# Patient Record
Sex: MALE | Race: WHITE | Employment: OTHER | URBAN - METROPOLITAN AREA
[De-identification: names, ages, dates, MRNs, and addresses within clinical notes are randomized per-mention and may not be internally consistent; named-entity substitution may affect disease eponyms.]

---

## 2020-12-03 ENCOUNTER — HOSPITAL ENCOUNTER (OUTPATIENT)
Dept: INTERVENTIONAL RADIOLOGY/VASCULAR | Age: 75
Discharge: HOME | End: 2020-12-05

## 2020-12-03 PROCEDURE — 32555 ASPIRATE PLEURA W/ IMAGING: CPT

## 2020-12-03 PROCEDURE — 32555 ASPIRATE PLEURA W/ IMAGING: CPT | Performed by: RADIOLOGY

## 2020-12-03 PROCEDURE — C1729 CATH, DRAINAGE: HCPCS

## 2020-12-03 NOTE — BRIEF OP NOTE
Brief Postoperative Note    Bozena Wilcox  YOB: 1945  [id number]    Pre-operative Diagnosis and Procedure:  Thoracentesis for pleural effusion    Post-operative Diagnosis: Same    Anesthesia: Local    Estimated Blood Loss: < 10 cc    Surgeon: Neo NIETO     Complications: none    Specimen obtained: none     Findings: none     Neo Holloway II   12/3/2020 2:19 PM

## 2020-12-03 NOTE — H&P
Interventional Radiology  Attending Pre-operative History and Physical    DIAGNOSIS:  There is no problem list on file for this patient. CHIEF COMPLAINT: <principal problem not specified>        No current outpatient medications on file. Allergies not on file    No past medical history on file. No past surgical history on file. No family history on file.     Social History     Socioeconomic History    Marital status: Single     Spouse name: Not on file    Number of children: Not on file    Years of education: Not on file    Highest education level: Not on file   Occupational History    Not on file   Social Needs    Financial resource strain: Not on file    Food insecurity     Worry: Not on file     Inability: Not on file    Transportation needs     Medical: Not on file     Non-medical: Not on file   Tobacco Use    Smoking status: Not on file   Substance and Sexual Activity    Alcohol use: Not on file    Drug use: Not on file    Sexual activity: Not on file   Lifestyle    Physical activity     Days per week: Not on file     Minutes per session: Not on file    Stress: Not on file   Relationships    Social connections     Talks on phone: Not on file     Gets together: Not on file     Attends Pentecostalism service: Not on file     Active member of club or organization: Not on file     Attends meetings of clubs or organizations: Not on file     Relationship status: Not on file    Intimate partner violence     Fear of current or ex partner: Not on file     Emotionally abused: Not on file     Physically abused: Not on file     Forced sexual activity: Not on file   Other Topics Concern    Not on file   Social History Narrative    Not on file       ROS: Non-contributory other than as noted above    PHYSICAL EXAM:      Heart and Lungs:  demonstrate no contraindications to proceed    DATA:  CBC:   Lab Results   Component Value Date    WBC 4.1 12/03/2020    RBC 4.65 12/03/2020    HGB 12.7 12/03/2020 HCT 42.6 12/03/2020    MCV 91.6 12/03/2020    MCH 27.3 12/03/2020    MCHC 29.8 12/03/2020    RDW 13.0 12/03/2020     12/03/2020    MPV 11.5 12/03/2020     CBC with Differential:    Lab Results   Component Value Date    WBC 4.1 12/03/2020    RBC 4.65 12/03/2020    HGB 12.7 12/03/2020    HCT 42.6 12/03/2020     12/03/2020    MCV 91.6 12/03/2020    MCH 27.3 12/03/2020    MCHC 29.8 12/03/2020    RDW 13.0 12/03/2020    LYMPHOPCT 11.3 12/01/2020    MONOPCT 2.6 12/01/2020    BASOPCT 0.8 12/01/2020    MONOSABS 0.12 12/01/2020    LYMPHSABS 0.44 12/01/2020    EOSABS 0.10 12/01/2020    BASOSABS 0.00 12/01/2020     Platelets:    Lab Results   Component Value Date     12/03/2020     BUN/Creatinine:    Lab Results   Component Value Date    BUN 17 12/03/2020    CREATININE 0.6 12/03/2020       ASSESSMENT AND PLAN:  1. Thoracentesis for pleural effusion  2. Procedure options, risks and benefits reviewed with patient. Patient expresses understanding.     Electronically signed by Esequiel Souza II, MD on 12/3/2020 at 2:19 PM

## 2021-02-02 ENCOUNTER — HOSPITAL ENCOUNTER (INPATIENT)
Age: 76
LOS: 24 days | Discharge: HOME | DRG: 056 | End: 2021-02-26
Attending: PHYSICAL MEDICINE & REHABILITATION | Admitting: PHYSICAL MEDICINE & REHABILITATION
Payer: MEDICARE

## 2021-02-02 PROBLEM — G93.41 ACUTE METABOLIC ENCEPHALOPATHY: Status: ACTIVE | Noted: 2021-02-02

## 2021-02-02 PROCEDURE — 6370000000 HC RX 637 (ALT 250 FOR IP): Performed by: PHYSICAL MEDICINE & REHABILITATION

## 2021-02-02 PROCEDURE — 2580000003 HC RX 258: Performed by: PHYSICAL MEDICINE & REHABILITATION

## 2021-02-02 PROCEDURE — 94760 N-INVAS EAR/PLS OXIMETRY 1: CPT

## 2021-02-02 PROCEDURE — 1280000000 HC REHAB R&B

## 2021-02-02 PROCEDURE — 94640 AIRWAY INHALATION TREATMENT: CPT

## 2021-02-02 RX ORDER — IPRATROPIUM BROMIDE AND ALBUTEROL SULFATE 2.5; .5 MG/3ML; MG/3ML
1 SOLUTION RESPIRATORY (INHALATION)
Status: DISCONTINUED | OUTPATIENT
Start: 2021-02-02 | End: 2021-02-26 | Stop reason: HOSPADM

## 2021-02-02 RX ORDER — ACETAMINOPHEN 325 MG/1
650 TABLET ORAL EVERY 4 HOURS PRN
Status: DISCONTINUED | OUTPATIENT
Start: 2021-02-02 | End: 2021-02-26 | Stop reason: HOSPADM

## 2021-02-02 RX ORDER — FUROSEMIDE 40 MG/1
40 TABLET ORAL DAILY
Status: ON HOLD | COMMUNITY
End: 2021-02-26 | Stop reason: HOSPADM

## 2021-02-02 RX ORDER — POLYETHYLENE GLYCOL 3350 17 G/17G
17 POWDER, FOR SOLUTION ORAL DAILY PRN
Status: DISCONTINUED | OUTPATIENT
Start: 2021-02-02 | End: 2021-02-20

## 2021-02-02 RX ORDER — PANTOPRAZOLE SODIUM 40 MG/1
40 TABLET, DELAYED RELEASE ORAL
Status: DISCONTINUED | OUTPATIENT
Start: 2021-02-03 | End: 2021-02-26 | Stop reason: HOSPADM

## 2021-02-02 RX ORDER — LORATADINE 10 MG/1
10 TABLET ORAL DAILY
Status: ON HOLD | COMMUNITY
End: 2021-02-26 | Stop reason: HOSPADM

## 2021-02-02 RX ORDER — SODIUM CHLORIDE 0.9 % (FLUSH) 0.9 %
10 SYRINGE (ML) INJECTION PRN
Status: DISCONTINUED | OUTPATIENT
Start: 2021-02-02 | End: 2021-02-11

## 2021-02-02 RX ORDER — CHLORHEXIDINE GLUCONATE 0.12 MG/ML
15 RINSE ORAL 2 TIMES DAILY
Status: DISCONTINUED | OUTPATIENT
Start: 2021-02-02 | End: 2021-02-26 | Stop reason: HOSPADM

## 2021-02-02 RX ORDER — ASPIRIN 81 MG/1
81 TABLET ORAL DAILY
Status: ON HOLD | COMMUNITY
End: 2021-02-26 | Stop reason: HOSPADM

## 2021-02-02 RX ORDER — ASPIRIN 81 MG/1
81 TABLET, CHEWABLE ORAL DAILY
Status: DISCONTINUED | OUTPATIENT
Start: 2021-02-03 | End: 2021-02-26 | Stop reason: HOSPADM

## 2021-02-02 RX ORDER — ATORVASTATIN CALCIUM 40 MG/1
40 TABLET, FILM COATED ORAL DAILY
Status: ON HOLD | COMMUNITY
End: 2021-02-26 | Stop reason: HOSPADM

## 2021-02-02 RX ORDER — CIPROFLOXACIN HYDROCHLORIDE 3.5 MG/ML
1 SOLUTION/ DROPS TOPICAL EVERY 6 HOURS SCHEDULED
Status: DISCONTINUED | OUTPATIENT
Start: 2021-02-02 | End: 2021-02-09

## 2021-02-02 RX ORDER — IPRATROPIUM BROMIDE AND ALBUTEROL SULFATE 2.5; .5 MG/3ML; MG/3ML
1 SOLUTION RESPIRATORY (INHALATION) EVERY 4 HOURS PRN
Status: DISCONTINUED | OUTPATIENT
Start: 2021-02-02 | End: 2021-02-25

## 2021-02-02 RX ORDER — ATORVASTATIN CALCIUM 20 MG/1
20 TABLET, FILM COATED ORAL NIGHTLY
Status: DISCONTINUED | OUTPATIENT
Start: 2021-02-02 | End: 2021-02-23

## 2021-02-02 RX ORDER — GLYCOPYRROLATE 1 MG/1
1 TABLET ORAL 2 TIMES DAILY
Status: DISCONTINUED | OUTPATIENT
Start: 2021-02-02 | End: 2021-02-08

## 2021-02-02 RX ORDER — ALBUTEROL SULFATE 1.25 MG/3ML
2.5 SOLUTION RESPIRATORY (INHALATION) 2 TIMES DAILY
Status: DISCONTINUED | OUTPATIENT
Start: 2021-02-02 | End: 2021-02-25

## 2021-02-02 RX ORDER — MINERAL OIL AND WHITE PETROLATUM 150; 830 MG/G; MG/G
OINTMENT OPHTHALMIC PRN
Status: DISCONTINUED | OUTPATIENT
Start: 2021-02-02 | End: 2021-02-26 | Stop reason: HOSPADM

## 2021-02-02 RX ORDER — POLYETHYLENE GLYCOL 3350 17 G/17G
17 POWDER, FOR SOLUTION ORAL DAILY
Status: DISCONTINUED | OUTPATIENT
Start: 2021-02-03 | End: 2021-02-17

## 2021-02-02 RX ORDER — DOXAZOSIN 2 MG/1
2 TABLET ORAL NIGHTLY
Status: ON HOLD | COMMUNITY
End: 2021-02-26 | Stop reason: HOSPADM

## 2021-02-02 RX ORDER — POTASSIUM CHLORIDE 20 MEQ/1
20 TABLET, EXTENDED RELEASE ORAL DAILY
Status: ON HOLD | COMMUNITY
End: 2021-02-26 | Stop reason: HOSPADM

## 2021-02-02 RX ADMIN — IPRATROPIUM BROMIDE AND ALBUTEROL SULFATE 1 AMPULE: 2.5; .5 SOLUTION RESPIRATORY (INHALATION) at 19:45

## 2021-02-02 RX ADMIN — APIXABAN 5 MG: 5 TABLET, FILM COATED ORAL at 21:08

## 2021-02-02 RX ADMIN — RIFAXIMIN 550 MG: 550 TABLET ORAL at 21:09

## 2021-02-02 RX ADMIN — METOPROLOL TARTRATE 25 MG: 25 TABLET, FILM COATED ORAL at 21:08

## 2021-02-02 RX ADMIN — SODIUM CHLORIDE, PRESERVATIVE FREE 10 ML: 5 INJECTION INTRAVENOUS at 21:09

## 2021-02-02 RX ADMIN — ATORVASTATIN CALCIUM 20 MG: 20 TABLET, FILM COATED ORAL at 21:09

## 2021-02-02 RX ADMIN — IPRATROPIUM BROMIDE AND ALBUTEROL SULFATE 1 AMPULE: 2.5; .5 SOLUTION RESPIRATORY (INHALATION) at 16:02

## 2021-02-02 RX ADMIN — GLYCOPYRROLATE 1 MG: 1 TABLET ORAL at 21:09

## 2021-02-02 SDOH — HEALTH STABILITY: MENTAL HEALTH: HOW OFTEN DO YOU HAVE A DRINK CONTAINING ALCOHOL?: NEVER

## 2021-02-02 NOTE — LETTER
PORTABLE PATIENT PROFILE  Vahe Rachel  [id number]    MEDICAL DIAGNOSIS/CONDITION:   Patient Active Problem List   Diagnosis    Acute metabolic encephalopathy    Severe protein-calorie malnutrition ([facility] [address]       INSURANCE INFORMATION:  Payor: MEDICARE /  /  /     ADVANCED DIRECTIVES:   Advance Directives (For Healthcare)  Pre-existing DNR Comfort Care/DNR Arrest/DNI Order: No  Healthcare Directive: No, patient does not have an advance directive for healthcare treatment  Information on Healthcare Directives Requested: No  Patient Requests Assistance: No  Advance Directives: Pt. not interested at this time  [unfilled]     EMERGENCY CONTACT:       RISK FACTORS:   Social History     Tobacco Use    Smoking status: Former Smoker     Packs/day: 1.00     Years: 30.00     Pack years: 30.00     Types: Pipe     Quit date: 1981     Years since quittin.1    Smokeless tobacco: Never Used   Substance Use Topics    Alcohol use: Never     Frequency: Never     Binge frequency: Never       ALLERGIES:  No Known Allergies    IMMUNIZATIONS:    There is no immunization history on file for this patient. SWALLOWING:   Difficulty Chewing or Swallowing Food: Yes (Comment)    VISION AND HEARING:        PHYSICIANS INVOLVED WITH CARE:    Robel Gaines MD  No ref. provider found  No primary care provider on file.   Robel Gaines MD

## 2021-02-02 NOTE — PROGRESS NOTES
Patient oriented to room and new admission folder given. Patient Guide reviewed and explanation of Patient Rights and Responsibilities completed. I gave a signed copy of The Important Message From Medicare to patient.  Radha Living  2/2/2021  1:22 PM

## 2021-02-03 LAB
ALBUMIN SERPL-MCNC: 3.3 G/DL (ref 3.5–5.2)
ALP BLD-CCNC: 125 U/L (ref 40–129)
ALT SERPL-CCNC: 29 U/L (ref 0–40)
ANION GAP SERPL CALCULATED.3IONS-SCNC: 8 MMOL/L (ref 7–16)
AST SERPL-CCNC: 26 U/L (ref 0–39)
BASOPHILS ABSOLUTE: 0.02 E9/L (ref 0–0.2)
BASOPHILS RELATIVE PERCENT: 0.6 % (ref 0–2)
BILIRUB SERPL-MCNC: 1 MG/DL (ref 0–1.2)
BILIRUBIN DIRECT: 0.3 MG/DL (ref 0–0.3)
BILIRUBIN, INDIRECT: 0.7 MG/DL (ref 0–1)
BUN BLDV-MCNC: 15 MG/DL (ref 8–23)
CALCIUM SERPL-MCNC: 9 MG/DL (ref 8.6–10.2)
CHLORIDE BLD-SCNC: 100 MMOL/L (ref 98–107)
CO2: 31 MMOL/L (ref 22–29)
CREAT SERPL-MCNC: 0.5 MG/DL (ref 0.7–1.2)
EOSINOPHILS ABSOLUTE: 0.17 E9/L (ref 0.05–0.5)
EOSINOPHILS RELATIVE PERCENT: 4.8 % (ref 0–6)
GFR AFRICAN AMERICAN: >60
GFR NON-AFRICAN AMERICAN: >60 ML/MIN/1.73
GLUCOSE BLD-MCNC: 99 MG/DL (ref 74–99)
HCT VFR BLD CALC: 41.5 % (ref 37–54)
HEMOGLOBIN: 13.6 G/DL (ref 12.5–16.5)
IMMATURE GRANULOCYTES #: 0.01 E9/L
IMMATURE GRANULOCYTES %: 0.3 % (ref 0–5)
LYMPHOCYTES ABSOLUTE: 0.58 E9/L (ref 1.5–4)
LYMPHOCYTES RELATIVE PERCENT: 16.4 % (ref 20–42)
MCH RBC QN AUTO: 27.8 PG (ref 26–35)
MCHC RBC AUTO-ENTMCNC: 32.8 % (ref 32–34.5)
MCV RBC AUTO: 84.7 FL (ref 80–99.9)
MONOCYTES ABSOLUTE: 0.31 E9/L (ref 0.1–0.95)
MONOCYTES RELATIVE PERCENT: 8.8 % (ref 2–12)
NEUTROPHILS ABSOLUTE: 2.45 E9/L (ref 1.8–7.3)
NEUTROPHILS RELATIVE PERCENT: 69.1 % (ref 43–80)
OVALOCYTES: ABNORMAL
PDW BLD-RTO: 15.8 FL (ref 11.5–15)
PLATELET # BLD: 118 E9/L (ref 130–450)
PMV BLD AUTO: 11.8 FL (ref 7–12)
POIKILOCYTES: ABNORMAL
POTASSIUM REFLEX MAGNESIUM: 4.4 MMOL/L (ref 3.5–5)
RBC # BLD: 4.9 E12/L (ref 3.8–5.8)
SODIUM BLD-SCNC: 139 MMOL/L (ref 132–146)
TOTAL PROTEIN: 6.5 G/DL (ref 6.4–8.3)
WBC # BLD: 3.5 E9/L (ref 4.5–11.5)

## 2021-02-03 PROCEDURE — 97130 THER IVNTJ EA ADDL 15 MIN: CPT

## 2021-02-03 PROCEDURE — 80076 HEPATIC FUNCTION PANEL: CPT

## 2021-02-03 PROCEDURE — 97129 THER IVNTJ 1ST 15 MIN: CPT

## 2021-02-03 PROCEDURE — 94640 AIRWAY INHALATION TREATMENT: CPT

## 2021-02-03 PROCEDURE — 97530 THERAPEUTIC ACTIVITIES: CPT

## 2021-02-03 PROCEDURE — 97166 OT EVAL MOD COMPLEX 45 MIN: CPT

## 2021-02-03 PROCEDURE — 1280000000 HC REHAB R&B

## 2021-02-03 PROCEDURE — 92523 SPEECH SOUND LANG COMPREHEN: CPT

## 2021-02-03 PROCEDURE — 85025 COMPLETE CBC W/AUTO DIFF WBC: CPT

## 2021-02-03 PROCEDURE — 80048 BASIC METABOLIC PNL TOTAL CA: CPT

## 2021-02-03 PROCEDURE — 6370000000 HC RX 637 (ALT 250 FOR IP): Performed by: PHYSICAL MEDICINE & REHABILITATION

## 2021-02-03 PROCEDURE — 6360000002 HC RX W HCPCS: Performed by: PHYSICAL MEDICINE & REHABILITATION

## 2021-02-03 PROCEDURE — 97162 PT EVAL MOD COMPLEX 30 MIN: CPT

## 2021-02-03 PROCEDURE — 36415 COLL VENOUS BLD VENIPUNCTURE: CPT

## 2021-02-03 PROCEDURE — 97535 SELF CARE MNGMENT TRAINING: CPT

## 2021-02-03 PROCEDURE — 97110 THERAPEUTIC EXERCISES: CPT

## 2021-02-03 RX ADMIN — GLYCOPYRROLATE 1 MG: 1 TABLET ORAL at 08:57

## 2021-02-03 RX ADMIN — ASPIRIN 81 MG: 81 TABLET, CHEWABLE ORAL at 08:56

## 2021-02-03 RX ADMIN — CIPROFLOXACIN HYDROCHLORIDE 1 DROP: 3 SOLUTION/ DROPS OPHTHALMIC at 11:52

## 2021-02-03 RX ADMIN — PANTOPRAZOLE SODIUM 40 MG: 40 TABLET, DELAYED RELEASE ORAL at 06:09

## 2021-02-03 RX ADMIN — APIXABAN 5 MG: 5 TABLET, FILM COATED ORAL at 08:56

## 2021-02-03 RX ADMIN — IPRATROPIUM BROMIDE AND ALBUTEROL SULFATE 1 AMPULE: 2.5; .5 SOLUTION RESPIRATORY (INHALATION) at 12:49

## 2021-02-03 RX ADMIN — CIPROFLOXACIN HYDROCHLORIDE 1 DROP: 3 SOLUTION/ DROPS OPHTHALMIC at 06:09

## 2021-02-03 RX ADMIN — APIXABAN 5 MG: 5 TABLET, FILM COATED ORAL at 21:00

## 2021-02-03 RX ADMIN — RIFAXIMIN 550 MG: 550 TABLET ORAL at 21:00

## 2021-02-03 RX ADMIN — CIPROFLOXACIN HYDROCHLORIDE 1 DROP: 3 SOLUTION/ DROPS OPHTHALMIC at 23:04

## 2021-02-03 RX ADMIN — METOPROLOL TARTRATE 25 MG: 25 TABLET, FILM COATED ORAL at 08:56

## 2021-02-03 RX ADMIN — ALBUTEROL SULFATE 2.5 MG: 1.25 SOLUTION RESPIRATORY (INHALATION) at 12:48

## 2021-02-03 RX ADMIN — CHLORHEXIDINE GLUCONATE 0.12% ORAL RINSE 15 ML: 1.2 LIQUID ORAL at 21:00

## 2021-02-03 RX ADMIN — IPRATROPIUM BROMIDE AND ALBUTEROL SULFATE 1 AMPULE: 2.5; .5 SOLUTION RESPIRATORY (INHALATION) at 19:51

## 2021-02-03 RX ADMIN — ACETAMINOPHEN 650 MG: 325 TABLET ORAL at 08:56

## 2021-02-03 RX ADMIN — GLYCOPYRROLATE 1 MG: 1 TABLET ORAL at 21:00

## 2021-02-03 RX ADMIN — RIFAXIMIN 550 MG: 550 TABLET ORAL at 08:57

## 2021-02-03 RX ADMIN — CHLORHEXIDINE GLUCONATE 0.12% ORAL RINSE 15 ML: 1.2 LIQUID ORAL at 08:56

## 2021-02-03 RX ADMIN — POLYETHYLENE GLYCOL 3350 17 G: 17 POWDER, FOR SOLUTION ORAL at 08:56

## 2021-02-03 RX ADMIN — METOPROLOL TARTRATE 25 MG: 25 TABLET, FILM COATED ORAL at 21:00

## 2021-02-03 RX ADMIN — ATORVASTATIN CALCIUM 20 MG: 20 TABLET, FILM COATED ORAL at 21:00

## 2021-02-03 RX ADMIN — CIPROFLOXACIN HYDROCHLORIDE 1 DROP: 3 SOLUTION/ DROPS OPHTHALMIC at 17:38

## 2021-02-03 ASSESSMENT — PAIN SCALES - GENERAL: PAINLEVEL_OUTOF10: 0

## 2021-02-03 NOTE — CARE COORDINATION
Social Work Assessment Summary    PCP: [facility]    Patient Resides: With son    Home Architecture : Trailer. 8 steps to entrance with a rail and ramp available. Bathroom has a walk-in shower. Will you return to your own home? yes        Primary Caregiver: Pancho [place] [employer] Herrera (55), unemployed, is healthy and can drive. Had 1 child who passed away. Level of Function PTA : Independent in all, but ambulated with a wwalker    Employment: retired from a [address] [place] [address] Ext. Is a  [employer] . Receives SSD no    DME Pta  : WC, [employer]    Community Agency Involvement PTA : received PT at [facility]    Do they have a medical profile: no    Family Teaching: TBD    Strength:  Motivated to get better    Preference: Be able to walk      NAME RELATION HOME # WORK # CELL #   Kat Herrera son [phone]                     Height: 6'3  Weight: [address] KRUPA Abreu  2/3/2021

## 2021-02-03 NOTE — CONSULTS
Terrence Madrid M.D.,[facility]  Karyle Seeds, D.O., F.DANIELE.MUSA.O.CHEIKH., Raul Ackerman M.D. Cuco Simon M.D., Sunita Esquivel M.D. Grey Lujan D.O. Patient:  Jose Luis aGldamez 68 y.o. male MRN: [id number]     Date of Service: 2/3/2021      PULMONARY CONSULTATION    Reason for Consultation: tracheostomy management  Referring Physician: Dr. Daniele Farfan MD    Communication with the referring physician will be sent via the electronic medical record. Chief Complaint: Cough    CODE STATUS: Full    SUBJECTIVE:  HPI:  Jose Luis Galdamez is a 68 y.o. This is a 76year old male with a PMH of COPD, aspiration pneumonia, CVA with residual left sided weakness, chronic dysphagia with a feeding tube, CAD s/p stenting, afib, witnessed aspiration and subsequent intubation. He initially presented to [facility] after having a witnessed aspiration event at home. He arrived to the ED unresponsive on CPAP and was then intubated and transferred to ICU. He has chronic dysphagia with a feeding tube. Hospital course included extubation on 11/5, re-intubation on 11/6, extubation and then re-intubation on 11/7. Since then he has failed SBT and failed to liberate from the ventilator. He was trached on the 13th of November 7 DI [facility]ran. Yris Segal was downsized to 6 cuffless on 1/27/2021. He has a history of a prior tracheostomy. He was transferred to [facility] specialty on 11/18 for continued vent management. During his stay at [facility] he was treated for aspiration pneumonia completing several courses of antibiotics including meropenem, inhaled tobramycin, and cefepime. He had developed bilateral pleural effusions with compressive atelectasis status post thoracentesis 1.5 L on 12/2/2020 fluid was transudate cultures none cytology negative. After trach downsized that he had had developed some hemoptysis. His Eliquis was placed on hold and bronchoscopy was performed for airway examination.   This revealed no obvious source of bleeding. He was liberated from the ventilator on 2021. He continued to improve from a clinical perspective and has been weaned off oxygen now with full trach capping on room air. Recent chest x-ray 2021 with resolving bibasilar infiltrates and atelectasis. Lab testing WBC 3.5, hemoglobin 13.6, platelets 463, sodium 139, potassium 4.4, BUN 15, creatinine 0.5, ALT 29, AST 26, respiratory viral film array negative including COVID-19 testing. Last respiratory culture 2021 with few PMNs on Gram stain positive for moderate growth of Pseudomonas aeruginosa. He transition to the acute rehab unit on 2021. We are asked to evaluate him and make recommendations for his ongoing care. He is sitting up in his wheelchair no acute distress. Phonation is strong. Occasional cough with very scant mucus production. Denies dyspnea or chest pain. No fevers or chills no nausea or vomiting. Tolerating tube feeds via PEG tube. No past medical history on file. Diagnosis Date    Heart disease    Lung disease   COPD    Stroke     No past surgical history on file. Tracheostomy 2020  Gastrostomy tube  No family history on file.     Social History:   Social History     Socioeconomic History    Marital status: Single     Spouse name: Not on file    Number of children: Not on file    Years of education: Not on file    Highest education level: Not on file   Occupational History    Not on file   Social Needs    Financial resource strain: Not on file    Food insecurity     Worry: Not on file     Inability: Not on file    Transportation needs     Medical: Not on file     Non-medical: Not on file   Tobacco Use    Smoking status: Former Smoker     Packs/day: 1.00     Years: 30.00     Pack years: 30.00     Types: Pipe     Quit date: 1981     Years since quittin.1    Smokeless tobacco: Never Used   Substance and Sexual Activity    Alcohol use: Never     Frequency: Never     Binge frequency: Never    Drug use: Never    Sexual activity: Not on file   Lifestyle    Physical activity     Days per week: Not on file     Minutes per session: Not on file    Stress: Not on file   Relationships    Social connections     Talks on phone: Not on file     Gets together: Not on file     Attends Mandaen service: Not on file     Active member of club or organization: Not on file     Attends meetings of clubs or organizations: Not on file     Relationship status: Not on file    Intimate partner violence     Fear of current or ex partner: Not on file     Emotionally abused: Not on file     Physically abused: Not on file     Forced sexual activity: Not on file   Other Topics Concern    Not on file   Social History Narrative    Not on file      Smoking history: The patient is a Past smoker of pipes. He apparently smoked for only a short time. ETOH: reports previous alcohol use. Vaccines:  Influenza: unknown  Pneumococcal Polysaccharide: 2019  Immunization History   Administered Date(s) Administered    Pneumococcal, Unspecified 11/01/2019           There is no immunization history on file for this patient.      Home Meds: Medications Prior to Admission: metoprolol tartrate (LOPRESSOR) 25 MG tablet, 25 mg by PEG Tube route 2 times daily  aspirin 81 MG EC tablet, Take 81 mg by mouth daily  atorvastatin (LIPITOR) 40 MG tablet, 40 mg by PEG Tube route daily  furosemide (LASIX) 40 MG tablet, 40 mg by PEG Tube route daily  doxazosin (CARDURA) 2 MG tablet, 2 mg by PEG Tube route nightly  potassium chloride (KLOR-CON M) 20 MEQ extended release tablet, Take 20 mEq by mouth daily  apixaban (ELIQUIS) 5 MG TABS tablet, Take 5 mg by mouth 2 times daily  loratadine (CLARITIN) 10 MG tablet, Take 10 mg by mouth daily    CURRENT MEDS :  Scheduled Meds:   albuterol  2.5 mg Nebulization BID    apixaban  5 mg Per G Tube BID    aspirin  81 mg PEG Tube Daily    atorvastatin  20 mg Oral Nightly    chlorhexidine  15 mL Mouth/Throat BID    ciprofloxacin  1 drop Left Eye 4 times per day    glycopyrrolate  1 mg PEG Tube BID    ipratropium-albuterol  1 ampule Inhalation Q4H WA    metoprolol tartrate  25 mg Oral BID    pantoprazole  40 mg Oral QAM AC    polyethylene glycol  17 g Per G Tube Daily    rifaximin  550 mg PEG Tube BID       Continuous Infusions:  none    No Known Allergies    REVIEW OF SYSTEMS:  Constitutional: Denies fever, weight loss, night sweats, and fatigue  Skin: Denies pigmentation, dark lesions, and rashes   HEENT: Denies hearing loss, tinnitus, ear drainage, epistaxis, sore throat, and hoarseness. Cardiovascular: Denies palpitations, chest pain, and chest pressure. Respiratory: Denies  dyspnea at rest, hemoptysis, apnea, and choking. Gastrointestinal: Denies nausea, vomiting, poor appetite, diarrhea, heartburn or reflux  Genitourinary: Denies dysuria, frequency, urgency or hematuria  Musculoskeletal: Denies myalgias, muscle weakness, and bone pain  Neurological: Denies dizziness, vertigo, headache, and focal weakness  Psychological: Denies anxiety and depression  Endocrine: Denies heat intolerance and cold intolerance  Hematopoietic/Lymphatic: Denies bleeding problems and blood transfusions    OBJECTIVE:   /74   Pulse 93   Temp 97.7 °F (36.5 °C) (Oral)   Resp 16   Ht 6' 3\" (1.905 m)   Wt 212 lb 8 oz (96.4 kg)   SpO2 97%   BMI 26.56 kg/m²   SpO2 Readings from Last 1 Encounters:   02/03/21 97%        I/O:    Intake/Output Summary (Last 24 hours) at 2/3/2021 1116  Last data filed at 2/3/2021 0700  Gross per 24 hour   Intake 423 ml   Output 600 ml   Net -177 ml     Vent Information  SpO2: 97 %                Physical Exam:  General: The patient is lying in bed comfortably without any distress.   Breathing is not labored  HEENT: Pupils are equal round and reactive to light, there are no oral lesions and no post-nasal drip   Neck: supple without adenopathy  Cardiovascular: regular rate and rhythm without murmur or gallop  Respiratory: Clear to auscultation bilaterally without wheezing or crackles. Air entry is symmetric  Abdomen: soft, non-tender, non-distended, normal bowel sounds  Extremities: warm, no edema, no clubbing  Skin: no rash or lesion  Neurologic: CN II-XII grossly intact, no focal deficits    Pulmonary Function Testing none on file      Imaging personally reviewed:  FINDINGS:   Stable tracheostomy.  There is modest bibasilar atelectasis and/or   infiltrate.  This appears stable on the left and slightly more apparent on   the right.  Stable cardiomediastinal silhouette.       Impression   Bibasilar infiltrate and/or atelectasis with overall stability on the left   and slightly increased on the right. Echo:   2019 [facility] LV: Normal left ventricular EF, with a visually estimated ejection fraction of   60%. Impaired left ventricular diastolic function. Mild concentric left   ventricular hypertrophy. Normal left ventricular chamber dimensions.  RV: Normal   right ventricular size and function.  RA: Severe right atrial dilation.  LA:   Moderately severe left atrial dilation.  PERICARDIUM: No significant pericardial   effusion.  AORTA: Normal aortic root diameter.  VALVES:Mitral valve is normal   in mobility and thickness.  Mild calcified mitral annulus. Mild mitral   regurgitation. No definitive evidence of mitral valve prolapse. Aortic valve is   tricuspid in morphology. Mild to moderate aortic regurgitation. Mild aortic   stenosis with a mean/peak gradient of 14/23mmHg. MARIA ALEJANDRA of 1.30cm2. Max velocity is   2.4m/s. Moderate aortic valve calcification. Tricuspid valve is visualized.    Mild tricuspid regurgitation.  Estimated pulmonary artery pressure = 51 mmHg. Moderate pulmonary hypertension. Pulmonic valve not well visualized.  No obvious   pulmonic insufficiency. Dilated inferior vena cava. No obvious intracardiac   mass, thrombus or vegetation demonstrated.  No obvious cardiac shunt by color   doppler. Technically this is a fair study. Labs:  Lab Results   Component Value Date    WBC 3.5 02/03/2021    HGB 13.6 02/03/2021    HCT 41.5 02/03/2021    MCV 84.7 02/03/2021    MCH 27.8 02/03/2021    MCHC 32.8 02/03/2021    RDW 15.8 02/03/2021     02/03/2021    MPV 11.8 02/03/2021     Lab Results   Component Value Date     02/03/2021    K 4.4 02/03/2021     02/03/2021    CO2 31 02/03/2021    BUN 15 02/03/2021    CREATININE 0.5 02/03/2021    LABALBU 3.3 02/03/2021    CALCIUM 9.0 02/03/2021    GFRAA >60 02/03/2021    LABGLOM >60 02/03/2021     Lab Results   Component Value Date    PROTIME 19.7 12/03/2020    INR 1.7 12/03/2020     No results for input(s): PROBNP in the last 72 hours. No results for input(s): TROPONINI in the last 72 hours. No results for input(s): PROCAL in the last 72 hours. This SmartLink has not been configured with any valid records. Micro:  No results for input(s): CULTRESP in the last 72 hours. No results for input(s): LABGRAM in the last 72 hours. No results for input(s): LEGUR in the last 72 hours. No results for input(s): STREPNEUMAGU in the last 72 hours. No results for input(s): LP1UAG in the last 72 hours. Assessment:  1. Acute respiratory failure with hypoxia and hypercapnia-resolved  2. Hemoptysis post trach change 1/27/2021-resolved  3. Off vent since 1/6/2021  4. Aspiration pneumonia completed Merrem. History of witnessed aspiration event at home  5. Leukopenia  6. Tracheostomy 1113 from outside hospital, downsized to 1/27/2020 1/26 Shiley cuffless  7. Bilateral pleural effusions with compressive atelectasis status post thoracentesis 1.5 L fluid removed transudate on 12/2/20  8. History of CVA  9. Probable LEONARDO  10. COPD  6. CAD status post stents PCI 2018  12. A. fib on Eliquis  13. BPH  14. Cirrhosis  15. Bronchoscopy from outside hospital on 11/6/2020 with evidence of tracheobronchomalacia  Plan:  1.  Full trach capping monitor off oxygen keep greater than 92%. 2. 129 bronchoscopy for airway examination no obvious source of bleeding identified  3. Follow chest x-ray 2/1/2021  4. Previous ABGs with chronic hypercapnia 1/18/2021  5. Completed tobramycin aerosols and cefepime for Pseudomonas tracheobronchitis  6. Okay to continue baby aspirin and Eliquis resumed on 2/2/2021  7. Bronchodilator regimen albuterol twice daily, saline nebs 3% as needed. Robinul twice daily for secretions. 8. Speech therapy, PT OT  9. Nutritional support tube feeds via PEG tube  10. Bowel regimen  11. GI prophylaxis    Thank you for allowing me to participate in the care of Yane Chen. Please feel free to call with questions. This plan of care was reviewed in collaboration with Dr. Jareth Cooper    Electronically signed by PAYTON Torres - CNP on 2/3/2021 at 11:16 AM      Note: This report was completed utilizing computer voice recognition software. Every effort has been made to ensure accuracy, however; inadvertent computerized transcription errors may be present      I personally saw, examined, and cared for the patient. Labs, medications, radiographs reviewed. I agree with history exam and plans detailed in NP note.         Electronically signed by Mayra Armstrong DO on 2/3/2021 at 4:17 PM

## 2021-02-03 NOTE — H&P
[address]                  [address] [facility] [place],  [facility]                              HISTORY AND PHYSICAL    PATIENT NAME: Josiane Granados                     :        1945  MED REC NO:   [id number]                            ROOM:       5505  ACCOUNT NO:   [de-identified]                           ADMIT DATE: 2021  PROVIDER:     Raine Emerson MD    REHAB HISTORY AND PHYSICAL    AGE:  68. SEX:  Male. CHIEF COMPLAINT:  CVA. HISTORY OF PRESENT ILLNESS:  The patient apparently had been at  [facility].  He developed a CVA with respiratory  failure and ventilator dependence. He also had pneumonia. This  apparently started back in 2020 and he was intubated initially on  2020. He had to have a trach. He was eventually transferred to  [facility] on 2021. He has improved at [facility]. He  has been able to be weaned off of ventilator. I began seeing him there. His trach was downsized. He has been begun on an oral diet. He is  tolerating trach plugging now. He has developed what appears to be some  new redness at the left eye. He is moving fairly well, weaker on the  left side and is felt at this point to be a good candidate for further  rehab. He is still at a moderate assist level with most of his  functioning. PAST MEDICAL HISTORY:  1.  CVA. 2.  COPD. 3.  Coronary artery disease. 4.  Atrial fibrillation. ALLERGIES:  None known. CURRENT MEDICATIONS:  AccuNeb, Eliquis, aspirin, Lipitor, Peridex, Cipro  drops to the eye, Robinul, DuoNeb, Lopressor, Protonix, GlycoLax, and  rifaximin through the PEG tube. ALLERGIES:  None known. SOCIAL HISTORY:  He lives with his son. REVIEW OF SYSTEMS:  HEENT:  Positive for the current problem with the eye. PULMONARY:  Positive for COPD. CARDIAC:  Negative. GI:  Positive for PEG tube. :  Negative. ORTHOPEDIC:  Negative. NEUROLOGIC:  Positive for the stroke. PHYSICAL EXAMINATION:  GENERAL:  Well-nourished, well-developed white male, in no acute  distress. His trach is plugged. HEENT:  Head:  Normocephalic, atraumatic. Eyes:  Pupils are equal.   There is redness throughout the left eye with some drainage. NECK:  Cap trach in place. LUNGS:  Decreased breath sounds with a few scattered rales. HEART:  Regular rate and rhythm. S1, S2 normal.  No murmurs or gallops. ABDOMEN:  Bowel sounds normal.  Soft, nontender. No masses or  organomegaly with PEG tube in place. EXTREMITIES:  Without clubbing, cyanosis or edema. MENTAL STATUS:  Alert and oriented to person and partially to place. SENSATION:  Diminished on the left. NEUROMUSCULAR:  4+/5 throughout on the right, 3- at the left upper, 4-  at the left lower. PROBLEM LIST:  1. CVA with left hemiparesis. 2.  Prolonged ventilator dependence. 3.  Recent pneumonia. 4.  Status post trach. 5.  Status post PEG tube. 6.  Conjunctivitis of the left eye. 7.  Coronary artery disease. 8.  History of atrial fibrillation, on Eliquis. INDIVIDUALIZED OVERALL PLAN OF CARE:  I think the patient is a good  candidate for further rehab. He is doing well from a respiratory  standpoint and I think we are going to be able to decannulate him. I  will re-consult Pulmonary. I want to consult Ophthalmology about the  apparent conjunctivitis. He continues on the Eliquis. I think he has  got good potential for improving his physical functioning, although he  is very complicated and has been in the hospital for almost three months  now. Rehab prognosis is good. Medical prognosis is good. PT will be working on ambulation, transfers and advanced transfers an  hour and a half, five to seven days a week with goals of standby to  contact guard.   OT will be working on upper extremity strengthening,  functioning, ADL skills and basic homemaking an hour and a half, five to

## 2021-02-03 NOTE — PROGRESS NOTES
Therapeutic Recreation Assessment     LEISURE INTEREST SURVEY               Key: P = Past Interest C = Current Interest F = Future Interest     Arts/Crafts:  ___Mechanical  ___Woodworking    ___ Painting   ___Floral arranging ___ Ceramics         _ __ Knitting                                                     ___ Crocheting        ___Other: ___________      Cooking:  _P _Baking _P__Cooking    ___   Other: _______   Comments:       Games:  _P__Cards  ___Darts  ___Board games    ___Word games  ___Crossword puzzles    ___Seek-n-find/jumble                   ___Other: _________      Horticulture:  ___Vegetables  ___Flowers  ___House plants                                                     ___Other: ___________      House/Yard Care:  ___Ironing  ___Laundry  ___Cleaning                                                      _C__Repairs              P___Lawn care                                                     ___Other: ___________      Music Listening/Playing:  ___Classical       _C__Big Band       ___Bumb-z-Dlxy                                                     ___Country          ___R&B             ___Gospel/Hymns                                                     _C__Other: _2846'[address] & 1960'S__________      Outdoor Activities:  ___Hunting          ___Fishing          ___Camping                                                     ___Other: ___________      Pets/Animals:  P___Dogs             ___Cats                ___Fish                                                     ___Birds             ___Livestock         ___Other: _________    Reading:   ___Newspapers  ___Magazines ___Other:stories                                                     ___Other: ___________      [employer]ist Activities:  Restorationism: [employer]__________   Naheed Hose Activities: ___________      Shopping:   ___Grocery  ___Clothing  ___Flea market     ___Other: ___________      Socialization: _C__Family  C  Friends  ___Neighbors ___Church  ___Volunteer ___Club/Organization                                                     ___Other: ___________    Sports/Exercises:  ___Walking  ___Football  P___Basketball     ___Golf  ___Baseball  ___Tennis       ___Bowling  _C__Other: _Coached__________      Traveling:   ___Global  ___Stateside  _C__Local       ___Other: ___________      TV/Radio:   ___Mysteries  ___Comedies ___Romance                                                     ___Game show       ___Sports       ___News                                                      ___Talk show          _C__Other: __Westerns_________      OtherIlda Cruz would like to be addressed as Bekevin Crest . Beuford Crest identified feelings of being sad . Personal Leisure Profile:   Question Response   Attributes Identify a positive quality: []Ambitious  []Appreciative  []Caring  []Courteous  []Considerate  []Compassionate  []Creative  []Dependable   []Generous  []Honest  []Hard working  [x]Helpful  []Kind  [][place]lin   []Mcgregor  []Mannerly  []Patient  []Polite  []Respectful  []Sociable  []Sense of humor  []Thoughtful  []Other: ___________    You are very good at [employer]   Awareness Why do you participate in your leisure interest: []Competition  []Creativity  []Concentration  []Exercise  []Enjoyment  []Fun  []Hand/eye Coordination  []Increase confidence  []Learning a new skill  []Relaxation  []Social Interaction  []Supporting one another  []Thinking  [x]Other: __Time consuming_________    Are your leisure activities limited at this time? [x]Yes  []No  Comments: _________    How often do you participate in your leisure interest? TV everyday   Resources Name a restaurant, store or business you frequent? [address] When are you most happy?  Helping people     Barriers to Leisure Participation:  []Visual   [][place]  []Cognition/Communication  []Motivation  []Financial  []Transportation  []Time Constraints  [x]Physical Ability  []Leisure Awareness  []Drug/Alcohol []Other: ___________    Recommendations:  []Group Session  [x]Individual Session  []Client Refused Services  []No Therapy  []Other: ___________    Objectives:  []Establish adaptations for leisure involvement   [x]Increase Self worth/self esteem  []Community reintegration training   [x]Social interaction  [x]Leisure participation  []Other: ___________    Goals for Therapeutic Recreation Services:   Social Interaction:   Admission Functional [place] Measure: ___6________  Discharge Functional [place] Measure: ____6_______   Other:________________________________      Leisure Choice/ Increase Mary Quality of Life: To participate in 1 premorbid leisure activities of choice by discharge to increase leisure choice and independence thus increasing the overall quality of his/her life. Socialization/Social Interaction: To increase social interaction skills by introducing self 1 x per week at the beginning of TR session Socialization/Social Interaction: To initiate conversation 1 x per week during the TR session    Leisure Activity Tolerance: To increase leisure tolerance by attending 1 leisure activities per week for 20 minutes with active participation. Self Expression: To participate in 1 leisure activity(s) of choice, identifying 1 benefits to leisure participation. Leisure Skills: To increase leisure skills by displaying the ability to participate in 1 new leisure activities by discharge. Self esteem/confidence: To complete 1 leisure activities with success 1x  by discharge. Adan Ward

## 2021-02-03 NOTE — PROGRESS NOTES
Physical Therapy  Initial Evaluation  Evaluating Therapist: Orquidea Shen DPT    NAME: Claire Mcadams  ROOM: 6245W  DIAGNOSIS: Acute metabolic encephalopathy  PRECAUTIONS: Falls, PEG tube, trach (capped), L hemiparesis   HPI: 76year old male admitted to [facility] on 11/18/20 with respiratory failure secondary to witnessed aspiration event at home - chronic dysphagia with feeding tube. Intubated twice at [facility] with subsequent trach placement. Admitted to [facility] for vent weaning. Transferred to ARU 2/1. Social:  Pt lives with his son in a trailer with 8 steps with B rails or a ramp to enter. Prior to admission pt ambulated with [employer] Mod I. Initial Evaluation  2/3/21          Short Term Goals Long Term Goals    Was pt agreeable to Eval/treatment? yes Initial Evaluation Initial Evaluation     Does pt have pain? L wrist and R shoulder pain       Bed Mobility  Rolling: SBA  Supine to sit: SBA  Sit to supine: SBA  Scooting: SBA   Supervision Independent   Transfers Sit to stand: Max A x2  Stand to sit: Max A x2  Stand pivot: NT  Slideboard: Max A   Min A SBA   Ambulation   NT   25 feet with [employer] with Min A >75 feet with [employer] with SBA   Walking 10 feet on uneven surface NT   10 feet with [employer] with Min A 10 feet with [employer] with SBA   Wheel Chair Mobility 100 feet SBA   >200 feet Supervision >200 feet Independent   Car Transfers NT   Min A SBA   Stair negotiation: ascended and descended NT   4 steps with B rail with Min A 4 steps with B rail with SBA   Curb Step:   ascended and descended NT       Picking up object off the floor NT       BLE ROM WFL       BLE Strength RLE 4/5  LLE 3+/5       Balance  Sitting: SBA   Static stand: Max A x2       Date Family Teach Completed TBA       Is additional Family Teaching Needed?   Y or N Y       Hindering Progress Debility, L hemiparesis       PT recommended ELOS 4-5 weeks       Team's Discharge Plan        Therapist at Team Meeting          Pt is alert and Oriented x3  Sensation: Pt reported

## 2021-02-03 NOTE — PROGRESS NOTES
vc's for moving in tight spaces     ADL  Feeding: Minimal assistance;Verbal cueing;Setup  Grooming: Minimal assistance; Increased time to complete;Verbal cueing;Setup  UE Bathing: Moderate assistance;Setup;Verbal cueing; Increased time to complete  LE Bathing: Maximum assistance;Setup;Verbal cueing; Increased time to complete  UE Dressing: Moderate assistance; Increased time to complete;Verbal cueing;Setup  LE Dressing: Dependent/Total;Setup;Verbal cueing; Increased time to complete     Coordination  Movements Are Fluid And Coordinated: No  Coordination and Movement description: Fine motor impairments;Right UE;Left UE     Bed mobility  Supine to Sit: Minimal assistance  Scooting: Minimal assistance  Comment: pt require vc's for hand placement  Transfers  Slide Board: Maximum assistance  Sit to stand: Maximum assistance  Stand to sit: Maximum assistance  Transfer Comments: vc's for hand placement     Vision - Basic Assessment  Prior Vision: Wears glasses all the time  Visual History: No significant visual history  Patient Visual Report: No visual complaint reported. Cognition  Overall Cognitive Status: Exceptions  Arousal/Alertness: Delayed responses to stimuli  Following Commands: (Pt followed a 2/3 step command)  Safety Judgement: Decreased awareness of need for assistance  Problem Solving: Assistance required to generate solutions;Assistance required to correct errors made  Insights: Decreased awareness of deficits  Initiation: Requires cues for some  Sequencing: Requires cues for some     Sensation  Overall Sensation Status: Impaired  Light Touch: Partial deficits in the LUE;Partial deficits in the RUE      LUE AROM: <3/4 shoulder, elbow & limited movement wrist due to wrist fused in [address]  Left Hand General AROM: Pt has a full grasp & release.  Pt able to oppose her thumb to his index & middle finger  RUE AROM : WFL  Right Hand AROM: WFL    LUE Strength  L Hand: 2+/5  LUE Strength Comment: 3+/5 shoulder & elbow within available ROM  RUE Strength  R Hand: 3+/5  RUE Strength Comment: 4-/5 shoulder, elbow & wrist     Hand Dominance: Right    Left Hand Strength -   Handle Setting 2: 8# & norm for pt age & gender is 55#  Right Hand Strength -   Handle Setting 2: 28#  & norm for pt age & gender is 66#    Fine Motor Skills  Left 9-Hole Peg Test: 50.6 seconds 7 norm for pt age & gender is 26.0 seconds  Right 9-Hole Peg Test: 56.1 seconds & norm for pt age & gender is 25.8 seconds     Plan   Times per week: OT twice a day for 2 weeks to address above prlbel areas  Times per day: Twice a day  Current Treatment Recommendations: Strengthening, Gait Training, Patient/Caregiver Education & Training, Home Management Training, ROM, Equipment Evaluation, Education, & procurement, Balance Training, Functional Mobility Training, Endurance Training, Wheelchair Mobility Training, Safety Education & Training, Self-Care / ADL    Assessment   Performance deficits / Impairments: Decreased functional mobility ; Decreased endurance;Decreased coordination;Decreased ADL status; Decreased posture;Decreased balance;Decreased ROM; Decreased strength;Decreased safe awareness;Decreased high-level IADLs;Decreased cognition;Decreased fine motor control  Prognosis: Good  Decision Making: Medium Complexity  OT Education: OT Role;Orientation;Plan of Care;Equipment;Precautions; ADL Adaptive Strategies;Transfer Training;Energy Conservation  Patient Education: Pt educated with ergards to OT proces. Pt participated in OT goal planning.  Pt educaiton to be ongoing to ensure pt carryover  REQUIRES OT FOLLOW UP: Yes  Activity Tolerance: Patient Tolerated treatment well  Safety Devices in place: Yes  Type of devices: Call light within reach         Goals  Long term goals  Time Frame for Long term goals : 4 weeks to address above problem areas  Long term goal 1: Pt demo Mod I to eat all meals  Long term goal 2: Pt demo Mod I grooiming seated  Long term goal 3: Pt demo Min A bathing @ sink level or shower level seated  Long term goal 4: Pt demo s/u UE & Min A LE dress to don depends, pants, socks & shoes using AE as needed  Long term goal 5: Pt demo Min-Mod  A commode trf wtih appropriate DME to ensure pt safety  Long term goals 6: Pt demo Min-Mod A tub trf with appropriate DME to ensure pt safety  Long term goal 7: Pt demo Sup light homemaking activity @ wc level  Long term goal 8: Pt demo G- endurance for a 20 minute funcitonal activity  Long term goal 9: Pt demo increase BUE strength to 4+/5 to improve pt ability to complete ADLs  Long term goal 10: Pt demo Mod I wc propulsion throughout a living environment & around obstacles  Patient Goals   Patient goals : \"I want to walk\"     Therapy Time   Individual Concurrent Group Co-treatment   Time In 820-OT eval  830-OT rx         Time Out 830-OT eval  900-OT rx         Minutes 40 Min OT total  10 MIn OT eval  [facility] OT rx            GILL, MARCE/[id number]

## 2021-02-03 NOTE — PROGRESS NOTES
Physical Therapy  Treatment Note  Evaluating Therapist: Shirley Woo DPT    NAME: Shen Bryan  ROOM: 2935D  DIAGNOSIS: Acute metabolic encephalopathy  PRECAUTIONS: Falls, PEG tube, trach (capped), L hemiparesis   HPI: 76year old male admitted to [facility] on 11/18/20 with respiratory failure secondary to witnessed aspiration event at home - chronic dysphagia with feeding tube. Intubated twice at [facility] with subsequent trach placement. Admitted to [facility] for vent weaning. Transferred to ARU 2/1. Social:  Pt lives with his son in a trailer with 8 steps with B rails or a ramp to enter. Prior to admission pt ambulated with [employer] Mod I. Initial Evaluation  2/3/21      PM    Short Term Goals Long Term Goals    Was pt agreeable to Eval/treatment? yes Initial Evaluation yes     Does pt have pain? L wrist and R shoulder pain  L wrist and R shoulder pain     Bed Mobility  Rolling: SBA  Supine to sit: SBA  Sit to supine: SBA  Scooting: SBA  NT - pt found and left in chair Supervision Independent   Transfers Sit to stand: Max A x2  Stand to sit: Max A x2  Stand pivot: NT  Slideboard: Max A  Sit to stand: Max A  Stand to sit: Max A  Stand pivot: NT  Slideboard: NT Min A SBA   Ambulation   NT  NT 25 feet with [employer] with Min A >75 feet with [employer] with SBA   Walking 10 feet on uneven surface NT  NT 10 feet with [employer] with Min A 10 feet with [employer] with SBA   Wheel Chair Mobility 100 feet SBA  NT >200 feet Supervision >200 feet Independent   Car Transfers NT  NT Min A SBA   Stair negotiation: ascended and descended NT  NT 4 steps with B rail with Min A 4 steps with B rail with SBA   Curb Step:   ascended and descended NT  NT     Picking up object off the floor NT  NT     BLE ROM WFL  WFL     BLE Strength RLE 4/5  LLE 3+/5  RLE 4/5  LLE 3+/5     Balance  Sitting: SBA   Static stand: Max A x2  Sitting: SBA   Static stand: Max A x2     Date Family Teach Completed TBA  TBA     Is additional Family Teaching Needed?   Y or N Y  Y     Hindering Progress Debility, L hemiparesis  Debility, L hemiparesis     PT recommended ELOS 4-5 weeks       Team's Discharge Plan        Therapist at Team Meeting          Therapeutic Exercise:   PM:   - Sit<>Stand x6 reps from chair in parallel bars with Max A - held each stand for as long as possible (~30-60 sec each rep)    Additional Comments: Focused on sit<>stand and static standing posture for as long as possible this afternoon in effort to improve the pt's standing tolerance, weight bearing, and BLE strength. Pt requires heavy assistance during sit<>stands and requires a B knee block due to the pt's impaired knee strength. Pt attempted to take a step forward however was only able to slide his R foot forward a few inches. Will continue to progress pt per pt tolerance. Future sessions will heavily emphasize transfers and ambulation. Patient/family education  Pt/family educated on standing posture in parallel bars    Patient/family response to education:   Pt/family verbalized understanding Pt/family demonstrated skill Pt/family requires further education in this area   yes With cues/assist yes     PM  Time in: 1515  Time out: 1600    Pt is making fair progress toward established Physical Therapy goals. Continue with physical therapy current plan of care.     Chantel Numbers, DPT [id number]

## 2021-02-03 NOTE — PROGRESS NOTES
SPEECH/LANGUAGE PATHOLOGY  SPEECH/LANGUAGE/COGNITIVE EVALUATION      PATIENT NAME:  Josiane Rachel      :  1945         TODAY'S DATE:  2/3/2021 ROOM:  [address] [place] [address]     ADMITTING DIAGNOSIS: Acute metabolic encephalopathy [V92.57]    SPEECH PATHOLOGY DIAGNOSIS:    Mild-moderate cognitive deficit. 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 Botox received in  with short term success). Decreased intelligibility due to weakness, missing dentition, fast rate and resonance issues. THERAPY RECOMMENDATIONS:   Speech Pathology intervention is recommended 3-6 times per week for LOS or when goals are met with emphasis on the following:     Improve STM recall, sequencing and problem solving for increased functional independence with completion of ADLs/IADLs to a FIM level commensurate with modified independent. FIMS SCORES      Swallowing    Current Status  1--Total Assistance    Short Term Goal 4--Minimal Assistance    Long Term Goal 5--Supervision     Receptive    Current Status  5--Supervision    Short Term Goal 6--Modified Independent    Long Term Goal 6--Modified Independent     Expressive    Current Status  5--Supervision    Short Term Goal 6--Modified Independent    Long Term Goal 6--Modified Independent     Social Interaction    Current Status  6--Modified Independent    Short Term Goal 6--Modified Independent    Long Term Goal 6--Modified Independent         Problem Solving    Current Status  4--Minimal Assistance    Short Term Goal 5--Supervision    Long Term Goal 6--Modified Independent      Memory    Current Status  4--Minimal Assistance  / 3--Moderate Assistance    Short Term Goal 4--Minimal Assistance   Long Term Goal 5--Supervision         MOTOR SPEECH       Oral Peripheral Examination   Left labiobuccal weakness and right labiobuccal weakness. CVAs , .     Parameters of Speech Production  Respiration:  Adequate for speech production  Articulation:  Distortion  Resonance:  Hyponasal  Quality:   Hoarse, mild  Pitch: Within functional limits  Intensity: Within functional limits  Fluency:  Intact  Prosody Intact    RECEPTIVE LANGUAGE    Comprehension of Yes/No Questions: Within functional limits    Process  Simple Verbal Commands:   Within functional limits  Process Intermediate Verbal Commands:   Within functional limits  Process Complex Verbal Commands:     Latent    Comprehension of Conversation:      Latent and Cueing      EXPRESSIVE LANGUAGE     Serials: Functional    Imitation:  Words   Functional   Sentences Functional    Naming:  (Modality used:  Verbal)  Confrontation Naming  Functional  Functional Description  Functional  Response Naming: Functional    Conversation:      Conversation was within functional limits given increased time and occasional prompts. Patient difficult to follow at times as he assumes shared knowledge and jumps quickly from topic to topic. Appeared to confuse some events in medical history timeline. COGNITION     Attention/Orientation  Attention: Sustained attention   Orientation:   Person:  oriented    Place:  oriented    Year:  correct    Month:  accurate within 5 days    Day of Week:  correct    Situation:  accurately described    Memory   Long Term Recall: Address:  recalled without cuing  Birthdate:  recalled without cuing  Age: recalled without cuing  Family: recalled without cuing    Immediate Recall: Sock:  recalled accurately     Blue:  recalled accurately     Bed:  recalled accurately    Delayed Recall:   Sock: recalled without cuing     Blue:  recalled without cuing     Bed:  recalled without cuing    Organization/Problem Solving/Reasoning   Sequencing:    Verbal: To be assessed      Motor: To be assessed    Problem solving: Verbal: Impaired      Motor: To be assessed    Mathematics:  Simple:  Functional     Complex:   To be assessed      CLINICAL OBSERVATIONS NOTED DURING THE EVALUATION  Latent responses and Cueing was required      EDUCATION    Speech Pathologist (SLP) completed education with the patient and/or family regarding identified cognitive linguistic deficits and subsequent need for speech pathology intervention. Discussed deficit areas to be targeted by formal intervention and established short/long term goals. Reviewed compensatory strategies to improve functional outcome (as appropriate). Encouraged patient and/or family to engage SLP in structured Q&A session relative to identified deficit areas. Patient and/or family indicated understanding of all information provided via satisfactory verbal response. ? Prognosis for improvements is good  This plan will be re-evaluated and revised in 1 week  if warranted. Patient stated goals: Agreed with above  Treatment goals discussed with Patient   The Patient understand(s) the diagnosis, prognosis and plan of care       The admitting diagnosis and active problem list, as listed below have been reviewed prior to initiation of this evaluation.         ACTIVE PROBLEM LIST:   Patient Active Problem List   Diagnosis    Acute metabolic encephalopathy

## 2021-02-03 NOTE — PROGRESS NOTES
Speech Language Pathology  ACUTE REHABILITATION--DAILY PROGRESS NOTE          ADMITTING DIAGNOSIS: Acute metabolic encephalopathy [G73.16]     SPEECH PATHOLOGY DIAGNOSIS:    Mild-moderate cognitive deficit. 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 Botox received in 2008 with short term success). Decreased intelligibility due to weakness, missing dentition, fast rate and resonance issues.     THERAPY RECOMMENDATIONS:   Speech Pathology intervention is recommended 3-6 times per week for LOS or when goals are met with emphasis on the following:      Improve STM recall, sequencing and problem solving for increased functional independence with completion of ADLs/IADLs to a FIM level commensurate with modified independent.                                                                                                                                         FIMS SCORES                            Swallowing                          Current Status             1--Total Assistance                   Short Term Goal         4--Minimal Assistance               Long Term Goal          5--Supervision      Receptive                          Current Status             5--Supervision               Short Term Goal         6--Modified Independent                       Long Term Goal          6--Modified Independent              Expressive                          Current Status             5--Supervision               Short Term Goal         6--Modified Independent                       Long Term Goal          6--Modified Independent              Social Interaction                          Current Status             6--Modified Independent                       Short Term Goal         6--Modified Independent                       Long Term Goal          6--Modified Independent                                                             Problem Solving Current Status            4--Minimal Assistance               Short Term Goal         5--Supervision               Long Term Goal          6--Modified Independent                          Memory                          Current Status            4--Minimal Assistance  / 3--Moderate Assistance                     Short Term Goal         4--Minimal Assistance   Long Term Goal          5--Supervision                                              SWALLOWING:      Diet:  NPO (nothing by mouth including oral meds). PEG tube in place. Chronic dysphagia reported but inconsistent information provided by patient. Recommend repeat video swallow study now that trach has been downsized and capped. LANGUAGE:     Patient has difficulty with topic maintainence, alerting conversation partners to changes in topic, and picking up on social cues to terminate conversation. COGNITION:      Decreased recall of today's events. Appeared to confuse some events in medical history timeline. Clinician administered the [facility] Mental Status Examination (UMS), to which the patient scored a 23 with indicates a mild neurocognitive disorder. During the examination, Patient benefited from cues such as repeated questions when allowed. Patient recalled 3/5 words during delayed memory task. Patient responded accurately to 50% of recall questions concerning a short oral reading. In one minute, Patient named 11 animals. Safety:  Fair-  Other therapies (PT, OT) report some concern with safety. SPEECH:     Difficult to understand due to facial weakness and absence of teeth. SP recommended after discharge: yes  Supervision recommended at discharge: Intermittent    Will continue SP intervention as per previously established POC.     Minute Tracking:    Individual therapy:   45 minutes  Concurrent therapy:    0 minutes  Group therapy:     0 minutes  Co-treatment therapy:    0 minutes    Total minutes for 2/3/2021: 45 minutes    Olman Santo   SLP student intern

## 2021-02-04 ENCOUNTER — APPOINTMENT (OUTPATIENT)
Dept: GENERAL RADIOLOGY | Age: 76
DRG: 056 | End: 2021-02-04
Attending: PHYSICAL MEDICINE & REHABILITATION
Payer: MEDICARE

## 2021-02-04 PROBLEM — E43 SEVERE PROTEIN-CALORIE MALNUTRITION (HCC): Chronic | Status: ACTIVE | Noted: 2021-02-04

## 2021-02-04 PROCEDURE — 2500000003 HC RX 250 WO HCPCS: Performed by: RADIOLOGY

## 2021-02-04 PROCEDURE — 6370000000 HC RX 637 (ALT 250 FOR IP): Performed by: PHYSICAL MEDICINE & REHABILITATION

## 2021-02-04 PROCEDURE — 94640 AIRWAY INHALATION TREATMENT: CPT

## 2021-02-04 PROCEDURE — 92526 ORAL FUNCTION THERAPY: CPT | Performed by: SPEECH-LANGUAGE PATHOLOGIST

## 2021-02-04 PROCEDURE — 6370000000 HC RX 637 (ALT 250 FOR IP): Performed by: OPHTHALMOLOGY

## 2021-02-04 PROCEDURE — 74230 X-RAY XM SWLNG FUNCJ C+: CPT

## 2021-02-04 PROCEDURE — 1280000000 HC REHAB R&B

## 2021-02-04 PROCEDURE — 97530 THERAPEUTIC ACTIVITIES: CPT

## 2021-02-04 PROCEDURE — 92611 MOTION FLUOROSCOPY/SWALLOW: CPT | Performed by: SPEECH-LANGUAGE PATHOLOGIST

## 2021-02-04 PROCEDURE — 97110 THERAPEUTIC EXERCISES: CPT

## 2021-02-04 PROCEDURE — 97535 SELF CARE MNGMENT TRAINING: CPT

## 2021-02-04 RX ORDER — POLYVINYL ALCOHOL 14 MG/ML
1 SOLUTION/ DROPS OPHTHALMIC 4 TIMES DAILY
Status: DISCONTINUED | OUTPATIENT
Start: 2021-02-04 | End: 2021-02-19

## 2021-02-04 RX ORDER — MINERAL OIL AND WHITE PETROLATUM 150; 830 MG/G; MG/G
OINTMENT OPHTHALMIC NIGHTLY
Status: DISCONTINUED | OUTPATIENT
Start: 2021-02-04 | End: 2021-02-26 | Stop reason: HOSPADM

## 2021-02-04 RX ADMIN — BARIUM SULFATE 15 ML: 400 SUSPENSION ORAL at 15:38

## 2021-02-04 RX ADMIN — IPRATROPIUM BROMIDE AND ALBUTEROL SULFATE 1 AMPULE: 2.5; .5 SOLUTION RESPIRATORY (INHALATION) at 16:34

## 2021-02-04 RX ADMIN — CHLORHEXIDINE GLUCONATE 0.12% ORAL RINSE 15 ML: 1.2 LIQUID ORAL at 08:17

## 2021-02-04 RX ADMIN — BARIUM SULFATE 15 ML: 400 PASTE ORAL at 15:38

## 2021-02-04 RX ADMIN — GLYCOPYRROLATE 1 MG: 1 TABLET ORAL at 21:55

## 2021-02-04 RX ADMIN — POLYVINYL ALCOHOL 1 DROP: 14 SOLUTION/ DROPS OPHTHALMIC at 20:46

## 2021-02-04 RX ADMIN — RIFAXIMIN 550 MG: 550 TABLET ORAL at 08:17

## 2021-02-04 RX ADMIN — METOPROLOL TARTRATE 25 MG: 25 TABLET, FILM COATED ORAL at 08:17

## 2021-02-04 RX ADMIN — CIPROFLOXACIN HYDROCHLORIDE 1 DROP: 3 SOLUTION/ DROPS OPHTHALMIC at 12:00

## 2021-02-04 RX ADMIN — APIXABAN 5 MG: 5 TABLET, FILM COATED ORAL at 08:17

## 2021-02-04 RX ADMIN — ATORVASTATIN CALCIUM 20 MG: 20 TABLET, FILM COATED ORAL at 20:44

## 2021-02-04 RX ADMIN — PANTOPRAZOLE SODIUM 40 MG: 40 TABLET, DELAYED RELEASE ORAL at 06:56

## 2021-02-04 RX ADMIN — POLYETHYLENE GLYCOL 3350 17 G: 17 POWDER, FOR SOLUTION ORAL at 08:17

## 2021-02-04 RX ADMIN — MINERAL OIL AND WHITE PETROLATUM: 150; 830 OINTMENT OPHTHALMIC at 20:45

## 2021-02-04 RX ADMIN — CHLORHEXIDINE GLUCONATE 0.12% ORAL RINSE 15 ML: 1.2 LIQUID ORAL at 20:45

## 2021-02-04 RX ADMIN — APIXABAN 5 MG: 5 TABLET, FILM COATED ORAL at 20:44

## 2021-02-04 RX ADMIN — GLYCOPYRROLATE 1 MG: 1 TABLET ORAL at 08:17

## 2021-02-04 RX ADMIN — ASPIRIN 81 MG: 81 TABLET, CHEWABLE ORAL at 08:17

## 2021-02-04 RX ADMIN — METOPROLOL TARTRATE 25 MG: 25 TABLET, FILM COATED ORAL at 20:44

## 2021-02-04 RX ADMIN — IPRATROPIUM BROMIDE AND ALBUTEROL SULFATE 1 AMPULE: 2.5; .5 SOLUTION RESPIRATORY (INHALATION) at 21:30

## 2021-02-04 RX ADMIN — RIFAXIMIN 550 MG: 550 TABLET ORAL at 20:44

## 2021-02-04 RX ADMIN — CIPROFLOXACIN HYDROCHLORIDE 1 DROP: 3 SOLUTION/ DROPS OPHTHALMIC at 16:28

## 2021-02-04 NOTE — PROGRESS NOTES
Speech Language Pathology  ACUTE REHABILITATION--DAILY PROGRESS NOTE          ADMITTING DIAGNOSIS: Acute metabolic encephalopathy [M18.95]     SPEECH PATHOLOGY DIAGNOSIS:    Mild-moderate cognitive deficit. 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 Botox received in 2008 with short term success). Decreased intelligibility due to weakness, missing dentition, fast rate and resonance issues.     THERAPY RECOMMENDATIONS:   Speech Pathology intervention is recommended 3-6 times per week for LOS or when goals are met with emphasis on the following:      Improve STM recall, sequencing and problem solving for increased functional independence with completion of ADLs/IADLs to a FIM level commensurate with modified independent.                                                                                                                                         FIMS SCORES                            Swallowing                          Current Status             1--Total Assistance                   Short Term Goal         4--Minimal Assistance               Long Term Goal          5--Supervision      Receptive                          Current Status             5--Supervision               Short Term Goal         6--Modified Independent                       Long Term Goal          6--Modified Independent              Expressive                          Current Status             5--Supervision               Short Term Goal         6--Modified Independent                       Long Term Goal          6--Modified Independent              Social Interaction                          Current Status             6--Modified Independent                       Short Term Goal         6--Modified Independent                       Long Term Goal          6--Modified Independent                                                             Problem Solving Current Status            4--Minimal Assistance               Short Term Goal         5--Supervision               Long Term Goal          6--Modified Independent                          Memory                          Current Status            4--Minimal Assistance  / 3--Moderate Assistance                     Short Term Goal         4--Minimal Assistance   Long Term Goal          5--Supervision                                              SWALLOWING:      Diet:  NPO (nothing by mouth including oral meds). PEG tube in place. Chronic dysphagia reported but inconsistent information provided by patient. Recommend repeat video swallow study now that trach has been downsized and capped. Lengthy case history taken by this SLP from patient and from pulmonary NP- see MBS report. Speech Pathologist (SLP) completed education with the patient/family regarding procedure of Modified Barium Swallow Study prior to exam and then type of swallowing impairment following completion of MBSS. Reviewed current solid/liquid consistency diet recommendations (NPO (nothing by mouth including oral meds). Discussed strategies trialed (as previously used by Pt) with poor outcome and continued aspiration. Images from MBSS reviewed with patient/ family and education provided. Reviewed aspiration precautions. Encouraged patient and/or family to engage SLP in unstructured Q&A session relative to identified deficit areas; indicated understanding of all information provided via satisfactory verbal response. LANGUAGE:         COGNITION:          Safety:  Fair-  Other therapies (PT, OT) report some concern with safety. SPEECH:     Difficult to understand due to facial weakness and absence of teeth. SP recommended after discharge: yes  Supervision recommended at discharge: Intermittent    Will continue SP intervention as per previously established POC.     Minute Tracking:    Individual therapy:   15 minutes  Concurrent therapy:    0 minutes  Group therapy:     0 minutes  Co-treatment therapy:    0 minutes    Total minutes for 2/4/2021: 15 minutes

## 2021-02-04 NOTE — PROGRESS NOTES
OCCUPATIONAL THERAPY DAILY NOTE    Date:2021  Patient Name: Alicia Scott  MRN: [id number]  : 1945  Room: [address] [place], [place] [address]     Diagnosis: RF secondary to aspiration @ home, trach & peg  Additional Pertinent Hx: CHF, COPD, hx CVA, dysphagia, peg heart disease  Precautions: Falls, NPO, Peg    Functional Assessment:   Date Status AE  Comments   Feeding 2/3/21 Min A     Grooming 2/3/21 Min A           Oral Care 2/3/21 Min A     Bathing 2/3/21 Max/Dep     UB Dressing 2/3/21 Mod A     LB Dressing 2/3/21 Dependent     Footwear 21 Mod A  Pt able to don/doff socks seated in chair utilizing leg cross over techniques with increased time required to complete. Min cues for safety required. Toileting 2/3/21 Max A     Homemaking  TBA       Functional Transfers / Balance:   Date Status DME  Comments   Sit Balance 21 SBA  Seated at EOB. Stand Balance 2/3/21 Max A     [] Tub  [] Shower   Transfer 2/3/21 TBA     Commode   Transfer 2/3/21 Dependent     Functional   Mobility 21 Min A w/c Maneuvering w/c throughout OT clinic. Pt requires assist to maneuver safely in doorways and around environmental obstacles. Other:  Bed>W/c   21   Max A   Transfer board     Pt required assist for board placement/remove and to complete transfer. Mod cues for sequencing and safety provided. Functional Exercises / Activity:  -UBE completed seated 2x5 minutes with focus on increasing B UE strength and overall endurance for functional activities. Fair+ tolerance.   -Red Candobar completed x40 in all planes to increase B UE strength and overall endurance for functional ADL tasks. -2# weighted ball exercises completed 3x10 reps in all planes to tolerance with focus on increasing B UE strength and overall endurance for functional transfers/ADL tasks. Fair- tolerance.   -Nut/bolt ladder completed seated at table with focus on increasing B UE strength and functional endurance for transfers and ADL tasks. Fair tolerance. Sensory / Neuromuscular Re-Education:      Cognitive Skills:   Status Comments   Problem   Solving fair     Memory fair     Sequencing fair     Safety fair -      Visual Perception:    Education:  -Pt educated on transfer safety and hand placement during transfer board transfers. Pt verbalized/demonstrated fair understanding, recommend continued education. [] Family teach completed on:    Pain Level: No c/o pain. Additional Notes:       Patient has made fair  progress during treatment sessions toward set goals. Therapy emphasis to obtain goals:  Current Treatment Recommendations: Strengthening, Gait Training, Patient/Caregiver Education & Training, Home Management Training, ROM, Equipment Evaluation, Education, & procurement, Balance Training, Functional Mobility Training, Endurance Training, Wheelchair Mobility Training, Safety Education & Training, Self-Care / ADL     Long term goal 1: Pt demo Mod I to eat all meals  Long term goal 2: Pt demo Mod I grooiming seated  Long term goal 3: Pt demo Min A bathing @ sink level or shower level seated  Long term goal 4: Pt demo s/u UE & Min A LE dress to don depends, pants, socks & shoes using AE as needed  Long term goal 5: Pt demo Min-Mod  A commode trf wtih appropriate DME to ensure pt safety  Long term goals 6: Pt demo Min-Mod A tub trf with appropriate DME to ensure pt safety  Long term goal 7: Pt demo Sup light homemaking activity @ wc level  Long term goal 8: Pt demo G- endurance for a 20 minute funcitonal activity  Long term goal 9: Pt demo increase BUE strength to 4+/5 to improve pt ability to complete ADLs  Long term goal 10: Pt demo Mod I wc propulsion throughout a living environment & around obstacles    [x] Continue with current OT Plan of care.   [] Prepare for Discharge     DISCHARGE RECOMMENDATIONS  Recommended DME: TBA    Post Discharge Care:   []Home Independently  [x]Home with 24hr Care / Supervision []Home with Partial Supervision []Home with Home Health OT []Home with Out Pt OT []Other: ___   Comments:         Time in Time out Tx Time Breakdown  Variance:   First Session  0830 0915 [x] Individual Tx-45 Mins   [] Concurrent Tx -  [] Co-Tx -   [] Group Tx -   [] Time Missed -     Second Session [phone] [x] Individual Tx-45 Mins   [] Concurrent Tx -  [] Co-Tx -   [] Group Tx -   [] Time Missed -     Third Session    [] Individual Tx-   [] Concurrent Tx -  [] Co-Tx -   [] Group Tx -   [] Time Missed -         Total Tx Time 90 Mins        Elisbaet ABDULLAHI/ROJELIO [id number]

## 2021-02-04 NOTE — PROGRESS NOTES
Physical Therapy  Treatment Note  Evaluating Therapist: Terry Agarwal DPT    NAME: Syl Turner  ROOM: [id number]  DIAGNOSIS: Acute metabolic encephalopathy  PRECAUTIONS: Falls, PEG tube, trach (capped), L hemiparesis   HPI: 76year old male admitted to [facility] on 11/18/20 with respiratory failure secondary to witnessed aspiration event at home - chronic dysphagia with feeding tube. Intubated twice at [facility] with subsequent trach placement. Admitted to [facility] for vent weaning. Transferred to ARU 2/1. Social:  Pt lives with his son in a trailer with 8 steps with B rails or a ramp to enter. Prior to admission pt ambulated with [employer] Mod I. Initial Evaluation  2/3/21 AM     PM    Short Term Goals Long Term Goals    Was pt agreeable to Eval/treatment? yes yes Off unit     Does pt have pain? L wrist and R shoulder pain L wrist and B shoulder pain      Bed Mobility  Rolling: SBA  Supine to sit: SBA  Sit to supine: SBA  Scooting: SBA NT - pt found and left in chair  Supervision Independent   Transfers Sit to stand: Max A x2  Stand to sit: Max A x2  Stand pivot: NT  Slideboard: Max A Sit to stand: Max A  Stand to sit: Max A  Stand pivot: NT  Slideboard: NT  Min A SBA   Ambulation   NT 1 step with each foot Max A + WC follow in parallel bars  25 feet with [employer] with Min A >75 feet with [employer] with SBA   Walking 10 feet on uneven surface NT NT  10 feet with [employer] with Min A 10 feet with [employer] with SBA   Wheel Chair Mobility 100 feet  feet Supervision  >200 feet Supervision >200 feet Independent   Car Transfers NT NT  Min A SBA   Stair negotiation: ascended and descended NT NT  4 steps with B rail with Min A 4 steps with B rail with SBA   Curb Step:   ascended and descended NT NT      Picking up object off the floor NT NT      BLE ROM WFL WFL      BLE Strength RLE 4/5  LLE 3+/5 RLE 4/5  LLE 3+/5      Balance  Sitting: SBA   Static stand: Max A x2 Sitting: SBA   Static stand:  Max A x2      Date Family Teach Completed TBA TBA      Is additional Family Teaching Needed? Y or N Y Y      Hindering Progress Debility, L hemiparesis Debility, L hemiparesis      PT recommended ELOS 4-5 weeks       Team's Discharge Plan        Therapist at Team Meeting          Therapeutic Exercise:   AM:   - Sit<>Stand x6 reps from chair in parallel bars with Max A - held each stand for as long as possible (~30-60 sec each rep)    Additional Comments: Pt's sessions are continuing to place a heavy emphasis increasing the pt's standing tolerance, weight bearing, and improvement in sit<>stand transfers. Pt still requires very heavy assistance to complete sit<>stand transfers and requires a B knee block to reach an upright position. Once pt is upright he stands with B flexed knees and can not obtain full hip extension. Upon standing pt's RR increased and breaths become more shallow. Attempting to have pt progress to walking in the parallel bars however he does not have enough functional strength to perform this as he was only able to take 1 step with each foot with significant levels of assistance from PT. Pt was off unit for PM session for testing. Will continue to progress pt per pt tolerance. Future sessions will heavily emphasize transfers and ambulation. Patient/family education  Pt/family educated on standing posture, increasing activity tolerance levels    Patient/family response to education:   Pt/family verbalized understanding Pt/family demonstrated skill Pt/family requires further education in this area   yes With cues/assist yes     AM  Time in: 0915  Time out: 1000  PM - missed time 45 minutes, off unit for testing    Pt is making fair progress toward established Physical Therapy goals. Continue with physical therapy current plan of care.     Manjit Huang DPT [id number]

## 2021-02-04 NOTE — PROGRESS NOTES
Progress Note  Date:2021       Room:[facility]-  Patient Name:Isael Rachel     Date of Birth:     Age:76 y.o. Subjective    Subjective:  Symptoms:  Stable. He reports weakness. Diet:  NPO. Activity level: Impaired due to weakness. Pain:  He reports no pain. Review of Systems   Neurological: Positive for weakness. Objective         Vitals Last 24 Hours:  TEMPERATURE:  Temp  Av.6 °F (36.4 °C)  Min: 97.3 °F (36.3 °C)  Max: 97.8 °F (36.6 °C)  RESPIRATIONS RANGE: Resp  Av  Min: 18  Max: 24  PULSE OXIMETRY RANGE: SpO2  Av %  Min: 97 %  Max: 99 %  PULSE RANGE: Pulse  Av  Min: 82  Max: 92  BLOOD PRESSURE RANGE: Systolic (00KGW), ECW:566 , Min:128 , AMW:049   ; Diastolic (95AJF), OYI:52, Min:74, Max:76    I/O (24Hr): Intake/Output Summary (Last 24 hours) at 2021 0900  Last data filed at 2/3/2021 2100  Gross per 24 hour   Intake 0 ml   Output 575 ml   Net -575 ml     Objective:  General Appearance: In no acute distress. Vital signs: (most recent): Blood pressure 130/74, pulse 82, temperature 97.3 °F (36.3 °C), temperature source Temporal, resp. rate 18, height 6' 3\" (1.905 m), weight 212 lb 8 oz (96.4 kg), SpO2 97 %. Vital signs are normal.    Output: Producing urine and producing stool. HEENT: (Tolerating trach plugging)    Lungs:  Normal effort and normal respiratory rate. Breath sounds clear to auscultation. Heart: Normal rate. Regular rhythm. S1 normal and S2 normal.    Abdomen: Abdomen is soft. Bowel sounds are normal.   There is no abdominal tenderness. Extremities: Normal range of motion. Neurological: Patient is alert. (4/5 strength).       Labs/Imaging/Diagnostics    Labs:  CBC:  Recent Labs     21  0524   WBC 3.5*   RBC 4.90   HGB 13.6   HCT 41.5   MCV 84.7   RDW 15.8*   *     CHEMISTRIES:  Recent Labs     21  0524      K 4.4      CO2 31*   BUN 15   CREATININE 0.5*   GLUCOSE 99     PT/INR:No results for input(s): PROTIME, INR in the last 72 hours. APTT:No results for input(s): APTT in the last 72 hours. LIVER PROFILE:  Recent Labs     02/03/21  0524   AST 26   ALT 29   BILIDIR 0.3   BILITOT 1.0   ALKPHOS 125       Imaging Last 24 Hours:  No results found. Assessment//Plan           Hospital Problems           Last Modified POA    Acute metabolic encephalopathy 4/0/7309 Yes        Assessment:    Condition: In stable condition. Improving. (Metabolic encephalopathy). Plan:   Encourage ambulation. (Tolerating therapy so far  Tolerating trach plugging  We are going to repeat a video  Hopefully will be able to start on some limited oral diet soon  Pulmonary is consulted in regards to eventual decannulation).        Electronically signed by Carlos Valera MD on 2/4/21 at 9:00 AM EST

## 2021-02-04 NOTE — PROGRESS NOTES
Comprehensive Nutrition Assessment    Type and Reason for Visit:  Initial, Positive Nutrition Screen    Nutrition Recommendations/Plan: Continue NPO, Modify current TF 2/2 non-fiber formula not necessary at this time  TF recommendation:  1.5 Calorie w/ Fiber @ 60 ml/hr x 20 hrs/day  Add 2 protein modulars daily  Will provide 1200 ml tv, 1800 kcals, 77 gm pro, 912 ml free water (2000 kcals, 129 gm pro w/ pro mods)  Regimen will meet 100% est nutrient needs    Nutrition Assessment:  Pt admit w/ acute metabolic encephalopathy, severe malnutrition w/ noted h/o CVA w/ dysphagia s/p PEG pta. Pt w/ recent admit for PNA s/p trach. Will provide updated TF recs and monitor. Malnutrition Assessment:  Malnutrition Status:  Severe malnutrition    Context:  Chronic Illness     Findings of the 6 clinical characteristics of malnutrition:  Energy Intake:  7 - 75% or less estimated energy requirements for 1 month or longer  Weight Loss:  7 - Greater than 7.5% over 3 months(15.5% wt loss x 3 months)     Body Fat Loss:  Unable to assess     Muscle Mass Loss:  Unable to assess    Fluid Accumulation:  No significant fluid accumulation     Strength:  Not Performed    Estimated Daily Nutrient Needs:  Energy (kcal):  ; Weight Used for Energy Requirements:  Current     Protein (g):  115-130; Weight Used for Protein Requirements:  Ideal(1.3-1.5)        Fluid (ml/day):  ; Method Used for Fluid Requirements:  1 ml/kcal      Nutrition Related Findings:  pt alert, noted metabolic encephalopathy improving, active BS, PEG, trach, no edema, fluids WDL      Wounds:  None       Current Nutrition Therapies:    DIET TUBE FEED CONTINUOUS/CYCLIC NPO; Other Tube Feeding (must specify product in comment) (Osmolite 1.5);  Gastrostomy; Continuous; 65; 20  Current Tube Feeding (TF) Orders:  · Feeding Route: PEG  · Formula: 1.5 Calorie without Fiber  · Schedule: Continuous  · Current TF & Flush Orders Provides: 65 ml/hr x 20 hrs; 1300 ml tv, 1950 kcals, 82 gm pro, 991 ml free water    Anthropometric Measures:  · Height: 6' 3\" (190.5 cm)  · Current Body Weight: 212 lb (96.2 kg)(2/2)   · Usual Body Weight: 250 lb (113.4 kg)(11/2020 actual per EMR)     · Ideal Body Weight: 196 lbs; % Ideal Body Weight 108.2 %   · BMI: 26.5  · BMI Categories: Overweight (BMI 25.0-29. 9)       Nutrition Diagnosis:   · Inadequate oral intake related to cognitive or neurological impairment as evidenced by NPO or clear liquid status due to medical condition, nutrition support - enteral nutrition    Nutrition Interventions:   Food and/or Nutrient Delivery:  Continue NPO, Modify Tube Feeding(TF recommendation: 1.5 Calorie w/ Fiber @ 60 ml/hr x 20 hrs/day + 2 protein modular daily)  Nutrition Education/Counseling:  Education not indicated   Coordination of Nutrition Care:  Continue to monitor while inpatient    Goals:  tolerance to EN at goal rate       Nutrition Monitoring and Evaluation:   Food/Nutrient Intake Outcomes:  Enteral Nutrition Intake/Tolerance  Physical Signs/Symptoms Outcomes:  Biochemical Data, Chewing or Swallowing, GI Status, Fluid Status or Edema, Nutrition Focused Physical Findings, Weight, Skin     Discharge Planning:    Enteral Nutrition     Electronically signed by Kenneth Miller MS, RD, LD on 2/4/21 at 10:35 AM EST    Contact: [phone]

## 2021-02-04 NOTE — CONSULTS
[address]  Virginie Quiñones  YOB: 1945  [id number]      Re:   [address]      The patient is a 68 y.o. male who is being evaluated for discharge        L   Eye since admission. .       Patient is oriented to person, place, time, and general circumstances. Past ocular history: Cataract surgery    No past medical history on file. No past surgical history on file. No Known Allergies    Medications- reviewed in chart    Review of Systems- reviewed with patient and reviewed in chart    Ophthalmic exam:  Pupils: Equally round and reactive to light   Extraocular motility: extra-ocular motions intact  Gross visual fields: normal     Visual Acuity with near card:  Right:  20/40      with bifocal glasses. 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 Recommend lubrication and evaluation by ophthalmologist after discharge. He sees Dr. Anca Schaffer in [place]. This is typically repaired by plastic surgeon. Patient seems to tolerate this pretty well does not complain of much surface irritation left eye. Plan:  I explained to patient what symptoms should be rechecked. Please notify me immediately if patient develops worsening of symptoms. Please have patient follow up with me or His/Hers ophthalmologist after discharge. My office # is [phone].   Thank you Wayne Lutz

## 2021-02-04 NOTE — PROGRESS NOTES
SPEECH/LANGUAGE PATHOLOGY  VIDEOFLUOROSCOPIC STUDY OF SWALLOWING (MBS)      PATIENT NAME:  Chioma Rob      :  1945      TODAY'S DATE:  2021   ROOM:  [address] [place] [address]    SUMMARY OF EVALUATION    Pt has long standing dysphagia hx; discussed case with Pt and then later with pulmonary NP who has followed Pt long term. 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 Per pulmonary NP, Pt has had multiple aspiration events, is currently with his second trach placement, and has COPD along with compromised lung status; no immediate plans for decannulation d/t previous episodes of hypercapnia. DYSPHAGIA DIAGNOSIS:  Severe oropharyngeal dysphagia      DIET RECOMMENDATIONS:  NPO (nothing by mouth including oral meds)       FEEDING RECOMMENDATIONS:     Assistance level:  Not applicable      Compensatory strategies recommended: Not applicable    THERAPY RECOMMENDATIONS:      Trial dysphagia therapy is recommended 3-5 times per week for LOS or when goals are met with emphasis on home exercise program / independence      Pt will complete BOTR strength/ ROM exercises to reduce pharyngeal residuals and improve epiglottic inversion with  maximal verbal prompts. Pt will complete laryngeal strength/ ROM therapeutic exercises to improve airway protection for the least restrictive PO diet with  maximal verbal prompts                 PROCEDURE     Consistencies Administered During the Evaluation   Liquids: nectar thick liquid and pudding thick liquid   Solids:  pureed foods      Method of Intake:   spoon  Fed by clinician      Position:   Seated, upright    Current Respiratory Status   room air-- capped trach in place.                  RESULTS     ORAL STAGE       Delayed A-P transit due to: decreased ability for initiation           PHARYNGEAL STAGE           ONSET TIME     Onset time of the pharyngeal swallow was adequate       PHARYNGEAL RESIDUALS Vallecula/Pharyngeal Wall           Residuals in the vallecula due to inadequate epiglottic inversion were noted for nectar consistency liquid, pudding consistency liquid and pureed foods  which did not clear with cued multiple swallow      Pyriform Sinuses      Residuals in the pyriform sinuses were noted due to pharyngeal weakness and cricopharyngeal dysfunction for nectar consistency liquid, pudding consistency liquid and pureed foods  which  did not clear with cued multiple swallow    LARYNGEAL PENETRATION     Laryngeal penetration occurred prior to aspiration. Further details under aspiration section. ASPIRATION    Aspiration occurred AFTER the swallow for nectar consistency liquid, pudding consistency liquid and pureed foods due to residuals in the laryngeal vestibule . Aspiration was moderate and occurred consistently . an absent cough/throat clear was noted         COMPENSATORY STRATEGIES       Compensatory strategies that were not beneficial included Chin tuck and Chin tuck with head turn to L (as previously instructed from previous SLP)      STRUCTURAL/FUNCTIONAL ANOMALIES       No structural/functional anomalies were noted; CP Bar      CERVICAL ESOPHAGEAL STAGE :        Was not assessed                            The Speech Language Pathologist (SLP) completed education with the patient regarding results of evaluation. Explained that Speech Pathology intervention is warranted  at this time   Prognosis for improvements is poor    This plan will be re-evaluated and revised in 1 week  if warranted. Patient stated goals: Agreed with above,   Treatment goals discussed with Patient   The Patient understand(s) the diagnosis, prognosis and plan of care       CPT code:  83443  dysphagia study        [x]The admitting diagnosis and active problem list, as listed below have been reviewed prior to initiation of this evaluation.      ADMITTING DIAGNOSIS: Acute metabolic encephalopathy [G93.41]     ACTIVE PROBLEM LIST:   Patient Active Problem List   Diagnosis    Acute metabolic encephalopathy    Severe protein-calorie malnutrition ([facility] 75.)     Geofm Sacks, B.S.   Intern

## 2021-02-05 PROCEDURE — 97530 THERAPEUTIC ACTIVITIES: CPT

## 2021-02-05 PROCEDURE — 6370000000 HC RX 637 (ALT 250 FOR IP): Performed by: PHYSICAL MEDICINE & REHABILITATION

## 2021-02-05 PROCEDURE — 2580000003 HC RX 258: Performed by: PHYSICAL MEDICINE & REHABILITATION

## 2021-02-05 PROCEDURE — 94640 AIRWAY INHALATION TREATMENT: CPT

## 2021-02-05 PROCEDURE — 97129 THER IVNTJ 1ST 15 MIN: CPT

## 2021-02-05 PROCEDURE — 97110 THERAPEUTIC EXERCISES: CPT

## 2021-02-05 PROCEDURE — 1280000000 HC REHAB R&B

## 2021-02-05 PROCEDURE — 97130 THER IVNTJ EA ADDL 15 MIN: CPT

## 2021-02-05 RX ADMIN — POLYVINYL ALCOHOL 1 DROP: 14 SOLUTION/ DROPS OPHTHALMIC at 21:10

## 2021-02-05 RX ADMIN — POLYVINYL ALCOHOL 1 DROP: 14 SOLUTION/ DROPS OPHTHALMIC at 16:05

## 2021-02-05 RX ADMIN — PANTOPRAZOLE SODIUM 40 MG: 40 TABLET, DELAYED RELEASE ORAL at 06:16

## 2021-02-05 RX ADMIN — CIPROFLOXACIN HYDROCHLORIDE 1 DROP: 3 SOLUTION/ DROPS OPHTHALMIC at 00:46

## 2021-02-05 RX ADMIN — RIFAXIMIN 550 MG: 550 TABLET ORAL at 08:19

## 2021-02-05 RX ADMIN — APIXABAN 5 MG: 5 TABLET, FILM COATED ORAL at 21:10

## 2021-02-05 RX ADMIN — CIPROFLOXACIN HYDROCHLORIDE 1 DROP: 3 SOLUTION/ DROPS OPHTHALMIC at 12:45

## 2021-02-05 RX ADMIN — CIPROFLOXACIN HYDROCHLORIDE 1 DROP: 3 SOLUTION/ DROPS OPHTHALMIC at 06:16

## 2021-02-05 RX ADMIN — RIFAXIMIN 550 MG: 550 TABLET ORAL at 21:10

## 2021-02-05 RX ADMIN — ASPIRIN 81 MG: 81 TABLET, CHEWABLE ORAL at 08:18

## 2021-02-05 RX ADMIN — GLYCOPYRROLATE 1 MG: 1 TABLET ORAL at 08:19

## 2021-02-05 RX ADMIN — APIXABAN 5 MG: 5 TABLET, FILM COATED ORAL at 08:19

## 2021-02-05 RX ADMIN — POLYVINYL ALCOHOL 1 DROP: 14 SOLUTION/ DROPS OPHTHALMIC at 12:45

## 2021-02-05 RX ADMIN — IPRATROPIUM BROMIDE AND ALBUTEROL SULFATE 1 AMPULE: 2.5; .5 SOLUTION RESPIRATORY (INHALATION) at 20:33

## 2021-02-05 RX ADMIN — SODIUM CHLORIDE, PRESERVATIVE FREE 10 ML: 5 INJECTION INTRAVENOUS at 08:19

## 2021-02-05 RX ADMIN — ACETAMINOPHEN 650 MG: 325 TABLET, FILM COATED ORAL at 21:13

## 2021-02-05 RX ADMIN — METOPROLOL TARTRATE 25 MG: 25 TABLET, FILM COATED ORAL at 08:18

## 2021-02-05 RX ADMIN — IPRATROPIUM BROMIDE AND ALBUTEROL SULFATE 1 AMPULE: 2.5; .5 SOLUTION RESPIRATORY (INHALATION) at 16:27

## 2021-02-05 RX ADMIN — ATORVASTATIN CALCIUM 20 MG: 20 TABLET, FILM COATED ORAL at 21:10

## 2021-02-05 RX ADMIN — CIPROFLOXACIN HYDROCHLORIDE 1 DROP: 3 SOLUTION/ DROPS OPHTHALMIC at 16:05

## 2021-02-05 RX ADMIN — GLYCOPYRROLATE 1 MG: 1 TABLET ORAL at 21:10

## 2021-02-05 RX ADMIN — METOPROLOL TARTRATE 25 MG: 25 TABLET, FILM COATED ORAL at 21:10

## 2021-02-05 RX ADMIN — MINERAL OIL AND WHITE PETROLATUM: 150; 830 OINTMENT OPHTHALMIC at 21:10

## 2021-02-05 RX ADMIN — IPRATROPIUM BROMIDE AND ALBUTEROL SULFATE 1 AMPULE: 2.5; .5 SOLUTION RESPIRATORY (INHALATION) at 12:40

## 2021-02-05 RX ADMIN — POLYVINYL ALCOHOL 1 DROP: 14 SOLUTION/ DROPS OPHTHALMIC at 08:19

## 2021-02-05 ASSESSMENT — PAIN DESCRIPTION - DESCRIPTORS: DESCRIPTORS: ACHING;DISCOMFORT

## 2021-02-05 ASSESSMENT — PAIN DESCRIPTION - PAIN TYPE: TYPE: ACUTE PAIN

## 2021-02-05 ASSESSMENT — PAIN DESCRIPTION - FREQUENCY: FREQUENCY: INTERMITTENT

## 2021-02-05 ASSESSMENT — PAIN DESCRIPTION - ONSET: ONSET: ON-GOING

## 2021-02-05 ASSESSMENT — PAIN DESCRIPTION - PROGRESSION: CLINICAL_PROGRESSION: NOT CHANGED

## 2021-02-05 ASSESSMENT — PAIN DESCRIPTION - ORIENTATION: ORIENTATION: LEFT

## 2021-02-05 ASSESSMENT — PAIN SCALES - GENERAL
PAINLEVEL_OUTOF10: 10
PAINLEVEL_OUTOF10: 0

## 2021-02-05 NOTE — PROGRESS NOTES
Progress Note  Date:2021       [facility]  Patient Name:Isael aRchel     Date of Birth:     Age:76 y.o. Subjective    Subjective:  Symptoms:  Stable. He reports weakness. Diet:  NPO. Activity level: Impaired due to weakness. Pain:  He reports no pain. Review of Systems   Neurological: Positive for weakness. Objective         Vitals Last 24 Hours:  TEMPERATURE:  Temp  Av.5 °F (36.4 °C)  Min: 97.5 °F (36.4 °C)  Max: 97.5 °F (36.4 °C)  RESPIRATIONS RANGE: Resp  Av  Min: 18  Max: 18  PULSE OXIMETRY RANGE: SpO2  Av %  Min: 96 %  Max: 96 %  PULSE RANGE: Pulse  Av  Min: 96  Max: 96  BLOOD PRESSURE RANGE: Systolic (35QUS), CNA:853 , Min:115 , OJK:265   ; Diastolic (13SCO), PVQ:84, Min:64, Max:64    I/O (24Hr): Intake/Output Summary (Last 24 hours) at 2021 0811  Last data filed at 2021 0600  Gross per 24 hour   Intake --   Output 500 ml   Net -500 ml     Objective:  General Appearance: In no acute distress. Vital signs: (most recent): Blood pressure 115/64, pulse 96, temperature 97.5 °F (36.4 °C), temperature source Oral, resp. rate 18, height 6' 3\" (1.905 m), weight 212 lb 8 oz (96.4 kg), SpO2 96 %. Vital signs are normal.    Output: Producing urine and producing stool. HEENT: (Trach plugged)    Lungs:  Normal effort and normal respiratory rate. Breath sounds clear to auscultation. Heart: Normal rate. Regular rhythm. S1 normal and S2 normal.    Abdomen: Abdomen is soft. Bowel sounds are normal.   There is no abdominal tenderness. Extremities: Normal range of motion. Neurological: Patient is alert. (4/5 strength).       Labs/Imaging/Diagnostics    Labs:  CBC:  Recent Labs     21   WBC 3.5*   RBC 4.90   HGB 13.6   HCT 41.5   MCV 84.7   RDW 15.8*   *     CHEMISTRIES:  Recent Labs     21      K 4.4      CO2 31*   BUN 15   CREATININE 0.5*   GLUCOSE 99     PT/INR:No results for input(s): PROTIME, INR in the last 72 hours. APTT:No results for input(s): APTT in the last 72 hours. LIVER PROFILE:  Recent Labs     02/03/21  0524   AST 26   ALT 29   BILIDIR 0.3   BILITOT 1.0   ALKPHOS 125       Imaging Last 24 Hours:  Fl Modified Barium Swallow W Video    Result Date: 2/4/2021  EXAMINATION: MODIFIED BARIUM SWALLOW WAS PERFORMED IN CONJUNCTION WITH SPEECH PATHOLOGY SERVICES TECHNIQUE: Fluoroscopic evaluation of the swallowing mechanism was performed with multiple consistency of barium product. FLUOROSCOPY DOSE AND TYPE OR TIME AND EXPOSURES: Images: 1 Fluoroscopic time: 1 minute Total dose: 7 mGy COMPARISON: 01/21/2021 HISTORY: ORDERING SYSTEM PROVIDED HISTORY: dysphagia TECHNOLOGIST PROVIDED HISTORY: Reason for exam:->dysphagia What reading provider will be dictating this exam?->CRC FINDINGS: Abnormal oral phase of the swallowing mechanism with delay. Abnormal pharyngeal phase of the swallow with vallecular and piriform sinus retention of barium followed by aspiration with nectar and pudding consistency barium. Absent cough reflex. Abnormal oral and pharyngeal phases of the swallowing mechanism with aspiration of nectar and pudding consistency barium and absent cough reflex. Please see separate speech pathology report for full discussion of findings and recommendations.     Assessment//Plan           Hospital Problems           Last Modified POA    Acute metabolic encephalopathy 8/7/9061 Yes    Severe protein-calorie malnutrition (HCC) (Chronic) 2/4/2021 Yes      Assessment:      Date   Status   AE    Comments     Feeding   2/3/21   Min A             Grooming   2/3/21   Min A                   Oral Care   2/3/21   Min A             Bathing   2/3/21   Max/Dep             UB Dressing   2/3/21   Mod A             LB Dressing   2/3/21   Dependent             Footwear   2/4/21   Mod A       Pt able to don/doff socks seated in chair utilizing leg cross over techniques with increased time required to complete. Min cues for safety required. Toileting   2/3/21   Max A             Homemaking       TBA                  Functional Transfers / Balance:      Date Status DME  Comments   Sit Balance 2/4/21   SBA   Seated at EOB. Stand Balance 2/3/21   Max A       []? Tub  []? Shower   Transfer 2/3/21   TBA       Commode   Transfer 2/3/21   Dependent       Functional   Mobility 2/4/21   Min A w/c Maneuvering w/c throughout OT clinic. Pt requires assist to maneuver safely in doorways and around environmental obstacles. Other:  Bed>W/c    2/4/21    Max A    Transfer board       Pt required assist for board placement/remove and to complete transfer. Mod cues for sequencing and safety provided.        Assessment:    Condition: In stable condition. Improving. (Metabolic encephalopathy). Plan:   Encourage ambulation. (Tolerating therapy so far  He had aspiration on the video  He will have to remain n.p.o. Seen by ophthalmology  The left eye problem is chronic dryness and she is ordered appropriate treatment  Tolerating plugging well  We will continue working on improving his strength  Pulmonary will decide about decannulation).        Electronically signed by Isaura Magaña MD on 2/5/21 at 8:11 AM EST

## 2021-02-05 NOTE — PROGRESS NOTES
Speech Language Pathology  ACUTE REHABILITATION--DAILY PROGRESS NOTE          ADMITTING DIAGNOSIS: Acute metabolic encephalopathy [Y42.81]     SPEECH PATHOLOGY DIAGNOSIS:    Mild-moderate cognitive deficit. 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 Botox received in 2008 with short term success). Decreased intelligibility due to weakness, missing dentition, fast rate and resonance issues.     THERAPY RECOMMENDATIONS:   Speech Pathology intervention is recommended 3-6 times per week for LOS or when goals are met with emphasis on the following:      Improve STM recall, sequencing and problem solving for increased functional independence with completion of ADLs/IADLs to a FIM level commensurate with modified independent.                                                                                                                                         FIMS SCORES                            Swallowing                          Current Status             1--Total Assistance                   Short Term Goal         4--Minimal Assistance               Long Term Goal          5--Supervision      Receptive                          Current Status             5--Supervision               Short Term Goal         6--Modified Independent                       Long Term Goal          6--Modified Independent              Expressive                          Current Status             5--Supervision               Short Term Goal         6--Modified Independent                       Long Term Goal          6--Modified Independent              Social Interaction                          Current Status             6--Modified Independent                       Short Term Goal         6--Modified Independent                       Long Term Goal          6--Modified Independent                                                             Problem Solving Current Status            4--Minimal Assistance               Short Term Goal         5--Supervision               Long Term Goal          6--Modified Independent                          Memory                          Current Status            4--Minimal Assistance  / 3--Moderate Assistance                     Short Term Goal         4--Minimal Assistance   Long Term Goal          5--Supervision                                              SWALLOWING:      Diet:  NPO (nothing by mouth including oral meds). PEG tube in place. Chronic dysphagia reported but inconsistent information provided by patient. Lengthy case history taken by other SLP from patient and from pulmonary NP- see MBS report. Note from 2/4: Speech Pathologist (SLP) completed education with the patient/family regarding procedure of Modified Barium Swallow Study prior to exam and then type of swallowing impairment following completion of MBSS. Reviewed current solid/liquid consistency diet recommendations (NPO (nothing by mouth including oral meds). Discussed strategies trialed (as previously used by Pt) with poor outcome and continued aspiration. Images from MBSS reviewed with patient/ family and education provided. Reviewed aspiration precautions. Encouraged patient and/or family to engage SLP in unstructured Q&A session relative to identified deficit areas; indicated understanding of all information provided via satisfactory verbal response. LANGUAGE:    Patient has difficulty with topic maintainence, alerting conversation partners to changes in topic, and picking up on social cues to terminate conversation. COGNITION:      Patient had fair recall of daily therapy schedule and previous sessions in speech therapy. Actively participated in a language-based cognitive activity in which clinician and patient took turns describing a wide variety of famous people, things, and places.  Demonstrated fair+ STM recall of stimulus items and was inconsistently able to provide response to probe items. Patient benefited from cues to elaborate details when providing descriptions to clinician. Patient attended to and recalled rules of new game. Safety:  Fair-  Other therapies (PT, OT) report some concern with safety. SPEECH:     Difficult to understand at times due to facial weakness and absence of teeth. SP recommended after discharge: yes  Supervision recommended at discharge: 24 hour    Will continue SP intervention as per previously established POC.     Minute Tracking:    Individual therapy:   45 minutes  Concurrent therapy:    0 minutes  Group therapy:     0 minutes  Co-treatment therapy:    0 minutes    Total minutes for 2/5/2021: 45 minutes    [employer]   SLP student intern

## 2021-02-05 NOTE — PROGRESS NOTES
OCCUPATIONAL THERAPY DAILY NOTE    Date:2021  Patient Name: Andrey Alejo  MRN: [id number]  : 1945  Room: [address] [place] [address]     Diagnosis: RF secondary to aspiration @ home, trach & peg  Additional Pertinent Hx: CHF, COPD, hx CVA, dysphagia, peg heart disease  Precautions: Falls, NPO, Peg    Functional Assessment:   Date Status AE  Comments   Feeding 2/3/21 Min A     Grooming 2/3/21 Min A           Oral Care 2/3/21 Min A     Bathing 2/3/21 Max/Dep     UB Dressing 2/3/21 Mod A     LB Dressing 2/3/21 Dependent     Footwear 21 Mod A      Toileting 2/3/21 Max A     Homemaking  TBA       Functional Transfers / Balance:   Date Status DME  Comments   Sit Balance 21 SBA     Stand Balance 2/3/21 Max A     [] Tub  [] Shower   Transfer 2/3/21 TBA     Commode   Transfer 2/3/21 Dependent     Functional   Mobility 21 Min A w/c Completed throughout OT gym with Min A required when maneuvering in smaller spaces. Min cues for technique and safety provided. Other:  Bed>W/c   21   Max A   Transfer board        Functional Exercises / Activity:  -5# Sanderbox completed x50 in all planes with focus on increasing B UE strength and overall endurance for functional activities. Fair+ tolerance.   -Red Candobar completed x40 in all planes to increase B UE strength and overall endurance for functional activities.   -Pt completed functional item retrieval activity w/c level utilizing reacher to retrieve rings safely from floor level. Pt required Min A to safely maneuver w/c in smaller spaces and around environmental obstacles. Pt then placed rings on ring tree with focus on trunk control/core strength and functional reaching. Fair+ tolerance.    BUE AROM exercises with pt completing ROM shoulder arc   B gross motor coordination  with pt completing graded clothespin activity   B hand strength with 7 # digi flex 3 sets 15 reps       Sensory / Neuromuscular Re-Education:      Cognitive Skills:   Status Comments   Problem Solving fair     Memory fair     Sequencing fair     Safety fair -      Visual Perception:    Education:  -Pt educated on w/c mobility techniques/safety when maneuvering around environmental obstacles and in smaller space. Pt verbalized/demonstrated fair understanding, recommend continued education. [] Family teach completed on:    Pain Level: No c/o pain. Additional Notes:       Patient has made fair  progress during treatment sessions toward set goals. Therapy emphasis to obtain goals:  Current Treatment Recommendations: Strengthening, Gait Training, Patient/Caregiver Education & Training, Home Management Training, ROM, Equipment Evaluation, Education, & procurement, Balance Training, Functional Mobility Training, Endurance Training, Wheelchair Mobility Training, Safety Education & Training, Self-Care / ADL     Long term goal 1: Pt demo Mod I to eat all meals  Long term goal 2: Pt demo Mod I grooiming seated  Long term goal 3: Pt demo Min A bathing @ sink level or shower level seated  Long term goal 4: Pt demo s/u UE & Min A LE dress to don depends, pants, socks & shoes using AE as needed  Long term goal 5: Pt demo Min-Mod  A commode trf wtih appropriate DME to ensure pt safety  Long term goals 6: Pt demo Min-Mod A tub trf with appropriate DME to ensure pt safety  Long term goal 7: Pt demo Sup light homemaking activity @ wc level  Long term goal 8: Pt demo G- endurance for a 20 minute funcitonal activity  Long term goal 9: Pt demo increase BUE strength to 4+/5 to improve pt ability to complete ADLs  Long term goal 10: Pt demo Mod I wc propulsion throughout a living environment & around obstacles    [x] Continue with current OT Plan of care.   [] Prepare for Discharge     DISCHARGE RECOMMENDATIONS  Recommended DME: TBA    Post Discharge Care:   []Home Independently  [x]Home with 24hr Care / Supervision []Home with Partial Supervision []Home with Home Health OT []Home with Out Pt OT []Other: ___   Comments:

## 2021-02-05 NOTE — PROGRESS NOTES
Physical Therapy  Treatment Note  Evaluating Therapist: Claus Fleming DPT    NAME: Dav Mora  ROOM: [age]Y  DIAGNOSIS: Acute metabolic encephalopathy  PRECAUTIONS: Falls, PEG tube, trach (capped), L hemiparesis, LE proprioceptive deficits  HPI: 76year old male admitted to [facility] on 11/18/20 with respiratory failure secondary to witnessed aspiration event at home - chronic dysphagia with feeding tube. Intubated twice at [facility] with subsequent trach placement. Admitted to [facility] for vent weaning. Transferred to ARU 2/1. Social:  Pt lives with his son in a trailer with 8 steps with B rails or a ramp to enter. Prior to admission pt ambulated with [facility] Mod I. Initial Evaluation  2/3/21 AM     PM    Short Term Goals Long Term Goals    Was pt agreeable to Eval/treatment? yes yes yes     Does pt have pain? L wrist and R shoulder pain L wrist and B shoulder pain L wrist and B shoulder pain     Bed Mobility  Rolling: SBA  Supine to sit: SBA  Sit to supine: SBA  Scooting: SBA NT - pt found and left in chair NT - pt found and left in chair Supervision Independent   Transfers Sit to stand: Max A x2  Stand to sit: Max A x2  Stand pivot: NT  Slideboard: Max A Sit to stand: Max A  Stand to sit: Max A  Stand pivot: NT  Slideboard: Max A Sit to stand: Max A  Stand to sit:  Max A  Stand pivot: NT  Slideboard: NT Min A SBA   Ambulation   NT See TE below See TE below 25 feet with [facility] with Min A >75 feet with [facility] with SBA   Walking 10 feet on uneven surface NT NT NT 10 feet with [facility] with Min A 10 feet with [facility] with SBA   Wheel Chair Mobility 100 feet  feet Supervision NT >200 feet Supervision >200 feet Independent   Car Transfers NT NT NT Min A SBA   Stair negotiation: ascended and descended NT NT NT 4 steps with B rail with Min A 4 steps with B rail with SBA   Curb Step:   ascended and descended NT NT NT     Picking up object off the floor NT NT TN     BLE ROM [facility] WFL      BLE Strength RLE 4/5  LLE 3+/5 RLE 4/5  LLE 3+/5 Balance  Sitting: SBA   Static stand: Max A x2 Sitting: SBA   Static stand: Max A       Date Family Teach Completed TBA TBA      Is additional Family Teaching Needed? Y or N Y Y      Hindering Progress Debility, L hemiparesis Debility, L hemiparesis      PT recommended ELOS 4-5 weeks       Team's Discharge Plan        Therapist at Team Meeting          Therapeutic Exercise:   AM:   - Ambulation in parallel bars with Max A (PT sitting in front of pt using harness and providing intermittent knee block) + WC follow - 3 feet x3 reps, 5 feet x2 reps, and 6 feet x1 rep  PM:  - Ambulation in parallel bars with Max A (PT sitting in front of pt using harness and providing intermittent knee block) + WC follow - 3 feet x3 reps  - Sit<>Stand x3 reps from chair in parallel bars with Max A - held each stand for as long as possible (~30 sec each rep)  - Wheelchair band-resisted leg press x50 reps to improve BLE strength    Additional Comments: Pt continues to require significant assistance for sit<>stand transfers, slideboard transfers, and ambulation in parallel bars. Pt's limited BLE strength and generalized debility are the pt's main limiting factors at this time. Pt has difficulty achieving full hip extension and B knee extension in a standing position. Pt when ambulating demonstrates a narrow AVI, flexed knees, and forward flexed posture. Pt's sessions are mainly consisting of sit<>stand transfers and ambulation in parallel bars in attempt to progress him in these basic, fundamental tasks.      Patient/family education  Pt/family educated on standing posture, ambulation mechanics in parallel bars    Patient/family response to education:   Pt/family verbalized understanding Pt/family demonstrated skill Pt/family requires further education in this area   yes With cues/assist yes     AM  Time in: 0830  Time out: 0915  PM   Time in: 1500  Time out: 1545    Pt is making fair progress toward established Physical Therapy goals. Continue with physical therapy current plan of care.     Luz Schulz DPT [id number]

## 2021-02-06 PROCEDURE — 1280000000 HC REHAB R&B

## 2021-02-06 PROCEDURE — 94640 AIRWAY INHALATION TREATMENT: CPT

## 2021-02-06 PROCEDURE — 92526 ORAL FUNCTION THERAPY: CPT

## 2021-02-06 PROCEDURE — 97530 THERAPEUTIC ACTIVITIES: CPT

## 2021-02-06 PROCEDURE — 6370000000 HC RX 637 (ALT 250 FOR IP): Performed by: PHYSICAL MEDICINE & REHABILITATION

## 2021-02-06 PROCEDURE — 2580000003 HC RX 258: Performed by: PHYSICAL MEDICINE & REHABILITATION

## 2021-02-06 RX ADMIN — GLYCOPYRROLATE 1 MG: 1 TABLET ORAL at 22:57

## 2021-02-06 RX ADMIN — APIXABAN 5 MG: 5 TABLET, FILM COATED ORAL at 22:57

## 2021-02-06 RX ADMIN — APIXABAN 5 MG: 5 TABLET, FILM COATED ORAL at 08:03

## 2021-02-06 RX ADMIN — POLYVINYL ALCOHOL 1 DROP: 14 SOLUTION/ DROPS OPHTHALMIC at 08:03

## 2021-02-06 RX ADMIN — PANTOPRAZOLE SODIUM 40 MG: 40 TABLET, DELAYED RELEASE ORAL at 05:55

## 2021-02-06 RX ADMIN — ATORVASTATIN CALCIUM 20 MG: 20 TABLET, FILM COATED ORAL at 22:57

## 2021-02-06 RX ADMIN — METOPROLOL TARTRATE 25 MG: 25 TABLET, FILM COATED ORAL at 08:03

## 2021-02-06 RX ADMIN — MINERAL OIL AND WHITE PETROLATUM: 150; 830 OINTMENT OPHTHALMIC at 23:07

## 2021-02-06 RX ADMIN — IPRATROPIUM BROMIDE AND ALBUTEROL SULFATE 1 AMPULE: 2.5; .5 SOLUTION RESPIRATORY (INHALATION) at 12:37

## 2021-02-06 RX ADMIN — IPRATROPIUM BROMIDE AND ALBUTEROL SULFATE 1 AMPULE: 2.5; .5 SOLUTION RESPIRATORY (INHALATION) at 15:47

## 2021-02-06 RX ADMIN — ASPIRIN 81 MG: 81 TABLET, CHEWABLE ORAL at 08:03

## 2021-02-06 RX ADMIN — CIPROFLOXACIN HYDROCHLORIDE 1 DROP: 3 SOLUTION/ DROPS OPHTHALMIC at 05:55

## 2021-02-06 RX ADMIN — POLYVINYL ALCOHOL 1 DROP: 14 SOLUTION/ DROPS OPHTHALMIC at 23:06

## 2021-02-06 RX ADMIN — POLYVINYL ALCOHOL 1 DROP: 14 SOLUTION/ DROPS OPHTHALMIC at 16:09

## 2021-02-06 RX ADMIN — GLYCOPYRROLATE 1 MG: 1 TABLET ORAL at 08:03

## 2021-02-06 RX ADMIN — CIPROFLOXACIN HYDROCHLORIDE 1 DROP: 3 SOLUTION/ DROPS OPHTHALMIC at 12:05

## 2021-02-06 RX ADMIN — METOPROLOL TARTRATE 25 MG: 25 TABLET, FILM COATED ORAL at 22:57

## 2021-02-06 RX ADMIN — SODIUM CHLORIDE, PRESERVATIVE FREE 10 ML: 5 INJECTION INTRAVENOUS at 08:03

## 2021-02-06 RX ADMIN — IPRATROPIUM BROMIDE AND ALBUTEROL SULFATE 1 AMPULE: 2.5; .5 SOLUTION RESPIRATORY (INHALATION) at 08:54

## 2021-02-06 RX ADMIN — CIPROFLOXACIN HYDROCHLORIDE 1 DROP: 3 SOLUTION/ DROPS OPHTHALMIC at 16:09

## 2021-02-06 RX ADMIN — ACETAMINOPHEN 650 MG: 325 TABLET, FILM COATED ORAL at 01:29

## 2021-02-06 RX ADMIN — CIPROFLOXACIN HYDROCHLORIDE 1 DROP: 3 SOLUTION/ DROPS OPHTHALMIC at 00:06

## 2021-02-06 RX ADMIN — RIFAXIMIN 550 MG: 550 TABLET ORAL at 22:57

## 2021-02-06 RX ADMIN — POLYVINYL ALCOHOL 1 DROP: 14 SOLUTION/ DROPS OPHTHALMIC at 12:05

## 2021-02-06 RX ADMIN — RIFAXIMIN 550 MG: 550 TABLET ORAL at 08:03

## 2021-02-06 RX ADMIN — IPRATROPIUM BROMIDE AND ALBUTEROL SULFATE 1 AMPULE: 2.5; .5 SOLUTION RESPIRATORY (INHALATION) at 19:44

## 2021-02-06 ASSESSMENT — PAIN DESCRIPTION - ONSET: ONSET: ON-GOING

## 2021-02-06 ASSESSMENT — PAIN SCALES - GENERAL
PAINLEVEL_OUTOF10: 4
PAINLEVEL_OUTOF10: 0

## 2021-02-06 ASSESSMENT — PAIN DESCRIPTION - ORIENTATION: ORIENTATION: LEFT

## 2021-02-06 ASSESSMENT — PAIN DESCRIPTION - FREQUENCY: FREQUENCY: INTERMITTENT

## 2021-02-06 NOTE — PROGRESS NOTES
Current Status            4--Minimal Assistance               Short Term Goal         5--Supervision               Long Term Goal          6--Modified Independent                          Memory                          Current Status            4--Minimal Assistance  / 3--Moderate Assistance                     Short Term Goal         4--Minimal Assistance   Long Term Goal          5--Supervision                                              SWALLOWING:      Diet:  NPO (nothing by mouth including oral meds). PEG tube in place. Chronic dysphagia reported but inconsistent information provided by patient. Lengthy case history taken by other SLP from patient and from pulmonary NP- see MBS report. Note from 2/4: Speech Pathologist (SLP) completed education with the patient/family regarding procedure of Modified Barium Swallow Study prior to exam and then type of swallowing impairment following completion of MBSS. Reviewed current solid/liquid consistency diet recommendations (NPO (nothing by mouth including oral meds). Discussed strategies trialed (as previously used by Pt) with poor outcome and continued aspiration. Images from MBSS reviewed with patient/ family and education provided. Reviewed aspiration precautions. Encouraged patient and/or family to engage SLP in unstructured Q&A session relative to identified deficit areas; indicated understanding of all information provided via satisfactory verbal response. 2/6: Pt completed oral motor exercises with fair+/good range of motion. He completed tongue-base retraction exercises fair and completed laryngeal elevation exercises fair-.  Pt was encouraged to complete exercises 2-3 more times today. LANGUAGE:  N/A this session    COGNITION:    N/A this session      Safety:  Fair-  Other therapies (PT, OT) report some concern with safety. SPEECH:     Difficult to understand at times due to facial weakness and absence of teeth.      SP

## 2021-02-06 NOTE — PROGRESS NOTES
Progress Note - covering Dr. Obi Snyder    Subjective/   68y.o. year old male on the rehab unit for metabolic encephalopathy. No new complaints. Feeling good. Tolerating therapy program.  No SOB or chest pain. Bowel and bladder OK. He is frustrated about his inability to ambulate. Objective/   VITALS:  /69   Pulse 101   Temp 98.5 °F (36.9 °C) (Temporal)   Resp 18   Ht 6' 3\" (1.905 m)   Wt 212 lb 8 oz (96.4 kg)   SpO2 94%   BMI 26.56 kg/m²   24HR INTAKE/OUTPUT:      Intake/Output Summary (Last 24 hours) at 2/6/2021 1502  Last data filed at 2/6/2021 1317  Gross per 24 hour   Intake 1400 ml   Output 775 ml   Net 625 ml     Constitutional:  Alert, awake, no apparent distress  Neck: trach in place but capped  Cardiovascular:  S1, S2 without m/r/g, distant  Respiratory:  CTA B without w/r/r, decreased breath sounds throughout  Abdomen: protuberant, PEG tube in place, no tenderness  Ext: 2+ (B) LE edema, no calf tenderness, no acute joint swelling or redness. Neuro: aaox3, diffuse weakness LEs >> UEs    Functional Level    Initial Evaluation  2/3/21 AM     Short Term Goals Long Term Goals    Was pt agreeable to Eval/treatment? yes yes       Does pt have pain? L wrist and R shoulder pain L wrist and B shoulder pain       Bed Mobility  Rolling: SBA  Supine to sit: SBA  Sit to supine: SBA  Scooting: SBA Rolling: SBA  Supine to sit: SBA with use of bed rail  Sit to supine: NT  Scooting: Min A to EOB   Supervision Independent   Transfers Sit to stand: Max A x2  Stand to sit: Max A x2  Stand pivot: NT  Slideboard: Max A Sit to stand: Max A  Stand to sit: Max A  Stand pivot: NT  Slideboard:  Max A Min A SBA   Ambulation   NT NT 25 feet with [facility] [place] with Min A >75 feet with [facility] [place] with SBA   Walking 10 feet on uneven surface NT NT 10 feet with [facility] [place] with Min A 10 feet with [facility] with SBA   Wheel Chair Mobility 100 feet  feet Supervision >200 feet Supervision >200 feet Independent   Car Transfers NT NT Min A SBA   Stair negotiation: ascended and descended NT NT 4 steps with B rail with Min A 4 steps with B rail with SBA   Curb Step:   ascended and descended NT NT       Picking up object off the floor NT NT       BLE ROM WFL WFL       BLE Strength RLE 4/5  LLE 3+/5 RLE 4/5  LLE 3+/5       Balance  Sitting: SBA   Static stand: Max A x2 Sitting: SBA   Static stand: Max A              Scheduled Meds:   lubrifresh P.M. Left Eye Nightly    polyvinyl alcohol  1 drop Both Eyes 4x Daily    albuterol  2.5 mg Nebulization BID    apixaban  5 mg Per G Tube BID    aspirin  81 mg PEG Tube Daily    atorvastatin  20 mg Oral Nightly    chlorhexidine  15 mL Mouth/Throat BID    ciprofloxacin  1 drop Left Eye 4 times per day    glycopyrrolate  1 mg PEG Tube BID    ipratropium-albuterol  1 ampule Inhalation Q4H WA    metoprolol tartrate  25 mg Oral BID    pantoprazole  40 mg Oral QAM AC    polyethylene glycol  17 g Per G Tube Daily    rifaximin  550 mg PEG Tube BID     Continuous Infusions:  PRN Meds:acetaminophen, ipratropium-albuterol, artificial tears, acetaminophen, polyethylene glycol, sodium chloride flush  I/O last 3 completed shifts: In: 1400 [NG/GT:1400]  Out: 775 [Urine:775]  No intake/output data recorded. Labs reviewed  CBC: No results for input(s): WBC, HGB, PLT in the last 72 hours. BMP:  No results for input(s): NA, K, CL, CO2, BUN, CREATININE, GLUCOSE in the last 72 hours. Hepatic: No results for input(s): AST, ALT, ALB, BILITOT, ALKPHOS in the last 72 hours. BNP: No results for input(s): BNP in the last 72 hours. Lipids: No results for input(s): CHOL, HDL in the last 72 hours. Invalid input(s): LDLCALCU  INR: No results for input(s): INR in the last 72 hours. Assessment/  Patient Active Problem List:     Acute metabolic encephalopathy     Severe protein-calorie malnutrition ([facility] [address]      Plan/  1. Continue rehab program  2. Continue anticoagulation  3. Continue skin care  4.  Continue mobilize as able    He remembers me from previous and remembers my office and previous associate.             Maci Morgan MD

## 2021-02-06 NOTE — FLOWSHEET NOTE
02/06/21 1200   Wound 02/06/21 Pretibial Right Large reddish black area, draining mod amount of blood. Date First Assessed/Time First Assessed: 02/06/21 1200   Present on Hospital Admission: Yes  Primary Wound Type: (c) Other (comment)  Location: Pretibial  Wound Location Orientation: Right  Wound Description (Comments): Large reddish black area, drain. .. Dressing Status New dressing applied   Wound Cleansed Cleansed with saline   Dressing/Treatment Petroleum gauze;ABD;Roll gauze   Dressing Change Due 02/07/21  (Need doctors. Sticky note left for doctor. )   Wound Length (cm) 19 cm   Wound Width (cm) 7.5 cm   Wound Surface Area (cm^2) 142.5 cm^2   Wound Assessment Bleeding;Pink/red; Other (Comment)  (Reddish, black area. )   Drainage Amount Moderate   Drainage Description Sanguinous   Odor None   Margie-wound Assessment Fragile

## 2021-02-06 NOTE — PROGRESS NOTES
Physical Therapy  Treatment Note    Evaluating Therapist: Wen Magana DPT    NAME: Julisa Cooper  ROOM: 09C  DIAGNOSIS: Acute metabolic encephalopathy  PRECAUTIONS: Falls, PEG tube, trach (capped), L hemiparesis, LE proprioceptive deficits  HPI: 76year old male admitted to [facility] on 11/18/20 with respiratory failure secondary to witnessed aspiration event at home - chronic dysphagia with feeding tube. Intubated twice at [facility] with subsequent trach placement. Admitted to [facility] for vent weaning. Transferred to ARU 2/1. Social:  Pt lives with his son in a trailer with 8 steps with B rails or a ramp to enter. Prior to admission pt ambulated with [employer] Mod I. Initial Evaluation  2/3/21 AM     Short Term Goals Long Term Goals    Was pt agreeable to Eval/treatment? yes yes     Does pt have pain? L wrist and R shoulder pain L wrist and B shoulder pain     Bed Mobility  Rolling: SBA  Supine to sit: SBA  Sit to supine: SBA  Scooting: SBA Rolling: SBA  Supine to sit: SBA with use of bed rail  Sit to supine: NT  Scooting: Min A to EOB   Supervision Independent   Transfers Sit to stand: Max A x2  Stand to sit: Max A x2  Stand pivot: NT  Slideboard: Max A Sit to stand: Max A  Stand to sit: Max A  Stand pivot: NT  Slideboard: Max A Min A SBA   Ambulation   NT NT 25 feet with [employer] with Min A >75 feet with [employer] with SBA   Walking 10 feet on uneven surface NT NT 10 feet with [employer] with Min A 10 feet with [employer] with SBA   Wheel Chair Mobility 100 feet  feet Supervision >200 feet Supervision >200 feet Independent   Car Transfers NT NT Min A SBA   Stair negotiation: ascended and descended NT NT 4 steps with B rail with Min A 4 steps with B rail with SBA   Curb Step:   ascended and descended NT NT     Picking up object off the floor NT NT     BLE ROM WFL WFL     BLE Strength RLE 4/5  LLE 3+/5 RLE 4/5  LLE 3+/5     Balance  Sitting: SBA   Static stand: Max A x2 Sitting: SBA   Static stand:  Max A      Date Family Teach Completed TBA TBA     Is additional Family Teaching Needed? Y or N Y Y     Hindering Progress Debility, L hemiparesis Debility, L hemiparesis     PT recommended ELOS 4-5 weeks      Team's Discharge Plan       Therapist at Team Meeting         Therapeutic Exercise:   AM:   - STS in parallel bars with Max A (PT sitting in front of pt using harness and providing intermittent knee block) x6 reps  -Standing weight shifts to R/L with focus on TKE BLE. Pt unable to stand >15-20 seconds and intermittent knee buckle present this date and required knee block   -Seated manual resisted TKE and HS curl x5 reps  -L HS manual stretch 2 reps ~25-30 seconds  -Seated WC push ups 3 reps to tolerance- pt able to clear Buttocks, but limited by shoulder pain  -WC Mobility with use of BLE only for HS strengthening    Additional Comments: Pt received supine and was agreeable to AM session. Pt required use of bed rail to assist with transfer. Pt required assist to scoot L hip to EOB this AM.  Pt required stepwise VCs for transfer board and frequent VCs to not lean posteriorly. Assist for B foot placement and heavy assist to complete transfer. STS completed in // bars with sling to assist.  Pt required B knees blocked and heavy assist to achieve semi-upright posture. Pt with slight B hip flexion and forward shoulders. Pt attempts to correct and over corrected at times. Pt required frequent and extended rest breaks due to debility. Poor eccentric control. Pt returned to room and was left sitting up following AM session. Patient/family education  Pt/family educated on standing posture, TKE and safety with transfer board. Patient/family response to education:   Pt/family verbalized understanding Pt/family demonstrated skill Pt/family requires further education in this area   yes With cues/assist yes     AM  Time in: 0935  Time out: 1010    Pt is making fair progress toward established Physical Therapy goals.   Continue with physical therapy current plan of care.     Alysa Amor, PT, DPT  License [id number]

## 2021-02-07 PROCEDURE — 6370000000 HC RX 637 (ALT 250 FOR IP): Performed by: PHYSICAL MEDICINE & REHABILITATION

## 2021-02-07 PROCEDURE — 97530 THERAPEUTIC ACTIVITIES: CPT

## 2021-02-07 PROCEDURE — 2580000003 HC RX 258: Performed by: PHYSICAL MEDICINE & REHABILITATION

## 2021-02-07 PROCEDURE — 1280000000 HC REHAB R&B

## 2021-02-07 PROCEDURE — 94640 AIRWAY INHALATION TREATMENT: CPT

## 2021-02-07 PROCEDURE — 97535 SELF CARE MNGMENT TRAINING: CPT

## 2021-02-07 RX ADMIN — ATORVASTATIN CALCIUM 20 MG: 20 TABLET, FILM COATED ORAL at 21:15

## 2021-02-07 RX ADMIN — METOPROLOL TARTRATE 25 MG: 25 TABLET, FILM COATED ORAL at 07:58

## 2021-02-07 RX ADMIN — RIFAXIMIN 550 MG: 550 TABLET ORAL at 21:16

## 2021-02-07 RX ADMIN — MINERAL OIL AND WHITE PETROLATUM: 150; 830 OINTMENT OPHTHALMIC at 21:15

## 2021-02-07 RX ADMIN — POLYVINYL ALCOHOL 1 DROP: 14 SOLUTION/ DROPS OPHTHALMIC at 16:39

## 2021-02-07 RX ADMIN — POLYVINYL ALCOHOL 1 DROP: 14 SOLUTION/ DROPS OPHTHALMIC at 12:00

## 2021-02-07 RX ADMIN — IPRATROPIUM BROMIDE AND ALBUTEROL SULFATE 1 AMPULE: 2.5; .5 SOLUTION RESPIRATORY (INHALATION) at 20:58

## 2021-02-07 RX ADMIN — POLYVINYL ALCOHOL 1 DROP: 14 SOLUTION/ DROPS OPHTHALMIC at 21:15

## 2021-02-07 RX ADMIN — GLYCOPYRROLATE 1 MG: 1 TABLET ORAL at 21:15

## 2021-02-07 RX ADMIN — GLYCOPYRROLATE 1 MG: 1 TABLET ORAL at 07:59

## 2021-02-07 RX ADMIN — IPRATROPIUM BROMIDE AND ALBUTEROL SULFATE 1 AMPULE: 2.5; .5 SOLUTION RESPIRATORY (INHALATION) at 12:46

## 2021-02-07 RX ADMIN — IPRATROPIUM BROMIDE AND ALBUTEROL SULFATE 1 AMPULE: 2.5; .5 SOLUTION RESPIRATORY (INHALATION) at 09:15

## 2021-02-07 RX ADMIN — CIPROFLOXACIN HYDROCHLORIDE 1 DROP: 3 SOLUTION/ DROPS OPHTHALMIC at 11:24

## 2021-02-07 RX ADMIN — METOPROLOL TARTRATE 25 MG: 25 TABLET, FILM COATED ORAL at 21:16

## 2021-02-07 RX ADMIN — CIPROFLOXACIN HYDROCHLORIDE 1 DROP: 3 SOLUTION/ DROPS OPHTHALMIC at 00:40

## 2021-02-07 RX ADMIN — ASPIRIN 81 MG: 81 TABLET, CHEWABLE ORAL at 07:58

## 2021-02-07 RX ADMIN — APIXABAN 5 MG: 5 TABLET, FILM COATED ORAL at 21:16

## 2021-02-07 RX ADMIN — CIPROFLOXACIN HYDROCHLORIDE 1 DROP: 3 SOLUTION/ DROPS OPHTHALMIC at 06:15

## 2021-02-07 RX ADMIN — IPRATROPIUM BROMIDE AND ALBUTEROL SULFATE 1 AMPULE: 2.5; .5 SOLUTION RESPIRATORY (INHALATION) at 15:48

## 2021-02-07 RX ADMIN — CIPROFLOXACIN HYDROCHLORIDE 1 DROP: 3 SOLUTION/ DROPS OPHTHALMIC at 16:39

## 2021-02-07 RX ADMIN — CHLORHEXIDINE GLUCONATE 0.12% ORAL RINSE 15 ML: 1.2 LIQUID ORAL at 21:16

## 2021-02-07 RX ADMIN — APIXABAN 5 MG: 5 TABLET, FILM COATED ORAL at 07:58

## 2021-02-07 RX ADMIN — SODIUM CHLORIDE, PRESERVATIVE FREE 10 ML: 5 INJECTION INTRAVENOUS at 21:15

## 2021-02-07 RX ADMIN — SODIUM CHLORIDE, PRESERVATIVE FREE 10 ML: 5 INJECTION INTRAVENOUS at 07:58

## 2021-02-07 RX ADMIN — PANTOPRAZOLE SODIUM 40 MG: 40 TABLET, DELAYED RELEASE ORAL at 06:15

## 2021-02-07 RX ADMIN — POLYVINYL ALCOHOL 1 DROP: 14 SOLUTION/ DROPS OPHTHALMIC at 07:58

## 2021-02-07 RX ADMIN — RIFAXIMIN 550 MG: 550 TABLET ORAL at 07:59

## 2021-02-07 ASSESSMENT — PAIN SCALES - GENERAL
PAINLEVEL_OUTOF10: 0

## 2021-02-07 NOTE — PROGRESS NOTES
OCCUPATIONAL THERAPY DAILY NOTE    Date:2021  Patient Name: Dena Glez  MRN: [id number]  : 1945  Room: [address] [place] [address]     Diagnosis: RF secondary to aspiration @ home, trach & peg  Additional Pertinent Hx: CHF, COPD, hx CVA, dysphagia, peg heart disease  Precautions: Falls, NPO, Peg    Functional Assessment:   Date Status AE  Comments   Feeding 2/3/21 Min A     Grooming 21 CGA seated Pt used washcloth to wash face and hands while seated in w/c         Oral Care 2/3/21 Min A     Bathing 21 UB- Min/Mod A  LB- Mod/Max simulated  Pt demo ability to reach arms, chest/stomach during simulated bathing then BLE crossover method to reach legs and feet. Pt unable to safety reach buttocks due to standing balance deficits and fall risk. UB Dressing 2/3/21 Mod A     LB Dressing 21 Mod A reacher Pt donned/doffed simulated pants up to thighs only using reacher with cues to keep w/c brakes locked. Footwear 21 Min/mod A  To readjust gripper socks on feet using BLE crossover method and one cue for safety in regards to locking w/c brakes. Toileting 2/3/21 Max A     Homemaking  TBA       Functional Transfers / Balance:   Date Status DME  Comments   Sit Balance 21 Close CGA  On EOB during sliding board placement keeping feet on floor. Pt needed vc's for BUE hand/BLE placement to reposition self back into w/c at CGA/min A with vc's for follow thru. Trunk control ex's to increase core strength first using dowel shane with assistance however patient c/o arm discomfort and exercise graded to using w/c arm rests, holding knees and folding both arms (hugging self) then bending forwards/backwards. Pt tolerated 3 reps of 10 ea. No pain reported in back. Stand Balance 21 Mod A x2  Pt required mod vc's with follow thru with simple sit to stand to adjust w/c cushion/remove pad following bed into w/c transfer. Pt kept unlocking w/c brakes at times with repositioning of BLE to prevent almost fall. [] Tub  [] Shower   Transfer 2/3/21 TBA     Commode   Transfer 2/3/21 Dependent     Functional   Mobility 2/7/21 Min A w/c Pt propelled w/c using BUE/BLE's to/from room over to gym needing Min A  when maneuvering in thru doorways or turns to increase overall strength and endurance for functional activities and ADL's    Other:  Bed>W/c      Supine to EOB   2/7/21 2/7/21   Max A x2      Mod/MaxA   Transfer board    Bed rail Pt required x2 assist for safety during EOB into w/c transfer due to hand/trunk placement on board along with decreasing retropulsion resulting in significant assistance x2 to prevent fall since kept pushing BLE outwards vs underneath him. Supine to EOB required mod./max assist with mod vc's for hand placement and trunk positioning due to retropulsion backwards      Functional Exercises / Activity:  Pt engaged in supine to EOB with mod vc's for hand/trunk placement for safety due to trunk retropulsion backwards vs scooting forwards using BUE and BLE onto the floor at mod/Max A. Max X2 with EOB into w/c with sliding board transfer due to hand/trunk placement then demo retropulsion almost into w/c needing mod  vc's to follow instructions to  Prevent fall with significant assistance by both therapist's to safely reposition patient back into w/c. Pt completed simple grooming at CGA while simulated bathing required UB- min/mod A and LB- mod/max A since patient used BLE crossover method to reach legs/feet while up in w/c. Pt propelled w/c using BUE/BLE's from room<>gym to improve overall strength/endurance for ADL;s, transfers and mobility skills needing CGA/Min A in order to maneuver thru doorways or around obstacles/turns. Pt performed seat level trunk ex's tolerating 3 reps of 10 ea to improve core strength/trunk control for transfers needing demo and vc's for follow thru.         Sensory / Neuromuscular Re-Education:      Cognitive Skills:   Status Comments   Problem   Solving fair Demo using with simulated bathing and LB dressing. Memory fair  \"   Sequencing fair  Mod vc's especially during sliding board transfers or trunk repositioning in w/c in regards to BLE placement vs extending legs outwards. Safety fair - Demo during supine to EOB and EOB into w/c with sliding board. Visual Perception:    Education:  Pt educated with w/c mobility training and maneuvering skills to improve ability around obstacles and smaller spaces. Pt educated about importance of BLE positioning and BUE hand placement during sliding board or sit to stand transfers to prevent falls. [] Family teach completed on:    Pain Level: No c/o pain. Additional Notes:       Patient has made fair  progress during treatment sessions toward set goals.  Therapy emphasis to obtain goals:  Current Treatment Recommendations: Strengthening, Gait Training, Patient/Caregiver Education & Training, Home Management Training, ROM, Equipment Evaluation, Education, & procurement, Balance Training, Functional Mobility Training, Endurance Training, Wheelchair Mobility Training, Safety Education & Training, Self-Care / ADL     Long term goal 1: Pt demo Mod I to eat all meals  Long term goal 2: Pt demo Mod I grooiming seated  Long term goal 3: Pt demo Min A bathing @ sink level or shower level seated  Long term goal 4: Pt demo s/u UE & Min A LE dress to don depends, pants, socks & shoes using AE as needed  Long term goal 5: Pt demo Min-Mod  A commode trf wtih appropriate DME to ensure pt safety  Long term goals 6: Pt demo Min-Mod A tub trf with appropriate DME to ensure pt safety  Long term goal 7: Pt demo Sup light homemaking activity @ wc level  Long term goal 8: Pt demo G- endurance for a 20 minute funcitonal activity  Long term goal 9: Pt demo increase BUE strength to 4+/5 to improve pt ability to complete ADLs  Long term goal 10: Pt demo Mod I wc propulsion throughout a living environment & around obstacles    [x] Continue

## 2021-02-08 PROCEDURE — 97110 THERAPEUTIC EXERCISES: CPT

## 2021-02-08 PROCEDURE — 97129 THER IVNTJ 1ST 15 MIN: CPT

## 2021-02-08 PROCEDURE — 97130 THER IVNTJ EA ADDL 15 MIN: CPT

## 2021-02-08 PROCEDURE — 92526 ORAL FUNCTION THERAPY: CPT

## 2021-02-08 PROCEDURE — 97535 SELF CARE MNGMENT TRAINING: CPT

## 2021-02-08 PROCEDURE — 6370000000 HC RX 637 (ALT 250 FOR IP): Performed by: PHYSICAL MEDICINE & REHABILITATION

## 2021-02-08 PROCEDURE — 94640 AIRWAY INHALATION TREATMENT: CPT

## 2021-02-08 PROCEDURE — 97530 THERAPEUTIC ACTIVITIES: CPT

## 2021-02-08 PROCEDURE — 1280000000 HC REHAB R&B

## 2021-02-08 RX ORDER — GLYCOPYRROLATE 1 MG/1
1 TABLET ORAL DAILY
Status: DISCONTINUED | OUTPATIENT
Start: 2021-02-09 | End: 2021-02-20

## 2021-02-08 RX ADMIN — ACETAMINOPHEN 650 MG: 325 TABLET, FILM COATED ORAL at 07:44

## 2021-02-08 RX ADMIN — IPRATROPIUM BROMIDE AND ALBUTEROL SULFATE 1 AMPULE: 2.5; .5 SOLUTION RESPIRATORY (INHALATION) at 20:48

## 2021-02-08 RX ADMIN — PANTOPRAZOLE SODIUM 40 MG: 40 TABLET, DELAYED RELEASE ORAL at 06:14

## 2021-02-08 RX ADMIN — APIXABAN 5 MG: 5 TABLET, FILM COATED ORAL at 20:54

## 2021-02-08 RX ADMIN — CIPROFLOXACIN HYDROCHLORIDE 1 DROP: 3 SOLUTION/ DROPS OPHTHALMIC at 12:24

## 2021-02-08 RX ADMIN — GLYCOPYRROLATE 1 MG: 1 TABLET ORAL at 07:42

## 2021-02-08 RX ADMIN — MINERAL OIL AND WHITE PETROLATUM: 150; 830 OINTMENT OPHTHALMIC at 20:54

## 2021-02-08 RX ADMIN — CIPROFLOXACIN HYDROCHLORIDE 1 DROP: 3 SOLUTION/ DROPS OPHTHALMIC at 01:00

## 2021-02-08 RX ADMIN — IPRATROPIUM BROMIDE AND ALBUTEROL SULFATE 1 AMPULE: 2.5; .5 SOLUTION RESPIRATORY (INHALATION) at 16:48

## 2021-02-08 RX ADMIN — CHLORHEXIDINE GLUCONATE 0.12% ORAL RINSE 15 ML: 1.2 LIQUID ORAL at 20:54

## 2021-02-08 RX ADMIN — CIPROFLOXACIN HYDROCHLORIDE 1 DROP: 3 SOLUTION/ DROPS OPHTHALMIC at 16:10

## 2021-02-08 RX ADMIN — METOPROLOL TARTRATE 25 MG: 25 TABLET, FILM COATED ORAL at 20:54

## 2021-02-08 RX ADMIN — POLYVINYL ALCOHOL 1 DROP: 14 SOLUTION/ DROPS OPHTHALMIC at 20:55

## 2021-02-08 RX ADMIN — APIXABAN 5 MG: 5 TABLET, FILM COATED ORAL at 07:43

## 2021-02-08 RX ADMIN — POLYVINYL ALCOHOL 1 DROP: 14 SOLUTION/ DROPS OPHTHALMIC at 12:24

## 2021-02-08 RX ADMIN — CHLORHEXIDINE GLUCONATE 0.12% ORAL RINSE 15 ML: 1.2 LIQUID ORAL at 07:42

## 2021-02-08 RX ADMIN — ATORVASTATIN CALCIUM 20 MG: 20 TABLET, FILM COATED ORAL at 20:54

## 2021-02-08 RX ADMIN — POLYVINYL ALCOHOL 1 DROP: 14 SOLUTION/ DROPS OPHTHALMIC at 07:45

## 2021-02-08 RX ADMIN — CIPROFLOXACIN HYDROCHLORIDE 1 DROP: 3 SOLUTION/ DROPS OPHTHALMIC at 06:14

## 2021-02-08 RX ADMIN — RIFAXIMIN 550 MG: 550 TABLET ORAL at 07:42

## 2021-02-08 RX ADMIN — IPRATROPIUM BROMIDE AND ALBUTEROL SULFATE 1 AMPULE: 2.5; .5 SOLUTION RESPIRATORY (INHALATION) at 13:05

## 2021-02-08 RX ADMIN — ASPIRIN 81 MG: 81 TABLET, CHEWABLE ORAL at 07:47

## 2021-02-08 RX ADMIN — POLYETHYLENE GLYCOL 3350 17 G: 17 POWDER, FOR SOLUTION ORAL at 07:42

## 2021-02-08 RX ADMIN — RIFAXIMIN 550 MG: 550 TABLET ORAL at 20:54

## 2021-02-08 RX ADMIN — METOPROLOL TARTRATE 25 MG: 25 TABLET, FILM COATED ORAL at 07:42

## 2021-02-08 ASSESSMENT — PAIN DESCRIPTION - PROGRESSION: CLINICAL_PROGRESSION: NOT CHANGED

## 2021-02-08 ASSESSMENT — PAIN DESCRIPTION - PAIN TYPE: TYPE: ACUTE PAIN

## 2021-02-08 ASSESSMENT — PAIN - FUNCTIONAL ASSESSMENT: PAIN_FUNCTIONAL_ASSESSMENT: ACTIVITIES ARE NOT PREVENTED

## 2021-02-08 ASSESSMENT — PAIN DESCRIPTION - DESCRIPTORS: DESCRIPTORS: ACHING;DISCOMFORT

## 2021-02-08 NOTE — PLAN OF CARE
Problem: Skin Integrity:  Goal: Will show no infection signs and symptoms  Description: Will show no infection signs and symptoms  Outcome: Met This Shift  Goal: Absence of new skin breakdown  Description: Absence of new skin breakdown  Outcome: Met This Shift     Problem: Falls - Risk of:  Goal: Will remain free from falls  Description: Will remain free from falls  Outcome: Met This Shift  Goal: Absence of physical injury  Description: Absence of physical injury  Outcome: Met This Shift     Problem: Respiratory:  Goal: Ability to maintain a clear airway will improve  Description: Ability to maintain a clear airway will improve  Outcome: Met This Shift  Goal: Will remain free from infection  Description: Will remain free from infection  Outcome: Met This Shift  Goal: Absence of aspiration  Description: Absence of aspiration  Outcome: Met This Shift     Problem: Safety:  Goal: Ability to chew and swallow food without choking will improve  Description: Ability to chew and swallow food without choking will improve  Outcome: Met This Shift  Goal: Ability to demonstrate good, daily oral hygiene techniques will improve  Description: Ability to demonstrate good, daily oral hygiene techniques will improve  Outcome: Met This Shift  Goal: Maintenance of upright position during and after feeding  Description: Maintenance of upright position during and after feeding  Outcome: Met This Shift     Problem: Pain:  Goal: Pain level will decrease  Description: Pain level will decrease  Outcome: Met This Shift  Goal: Control of acute pain  Description: Control of acute pain  Outcome: Met This Shift  Goal: Control of chronic pain  Description: Control of chronic pain  Outcome: Met This Shift

## 2021-02-08 NOTE — PROGRESS NOTES
Physical Therapy  Treatment Note  Evaluating Therapist: Susan Singletary DPT    NAME: Irasema Ba  ROOM: [facility]  DIAGNOSIS: Acute metabolic encephalopathy  PRECAUTIONS: Falls, PEG tube, trach (capped), L hemiparesis, LE proprioceptive deficits  HPI: 76year old male admitted to [facility] on 11/18/20 with respiratory failure secondary to witnessed aspiration event at home - chronic dysphagia with feeding tube. Intubated twice at [facility] with subsequent trach placement. Admitted to [facility] for vent weaning. Transferred to ARU 2/1. Social:  Pt lives with his son in a trailer with 8 steps with B rails or a ramp to enter. Prior to admission pt ambulated with [address] Mod I. Initial Evaluation  2/3/21 AM     PM    Short Term Goals Long Term Goals    Was pt agreeable to Eval/treatment? yes yes yes     Does pt have pain? L wrist and R shoulder pain L wrist and B shoulder pain L wrist and B shoulder pain     Bed Mobility  Rolling: SBA  Supine to sit: SBA  Sit to supine: SBA  Scooting: SBA NT - pt found and left in chair SBA all aspects Supervision Independent   Transfers Sit to stand: Max A x2  Stand to sit: Max A x2  Stand pivot: NT  Slideboard: Max A Sit to stand: Max A  Stand to sit: Max A  Stand pivot: NT  Slideboard: Max A Sit to stand: Max A  Stand to sit: Max A  Stand pivot: NT  Slideboard:  Max A Min A SBA   Ambulation   NT See TE below See TE below 25 feet with [address] with Min A >75 feet with [address] with SBA   Walking 10 feet on uneven surface NT NT NT 10 feet with [address] with Min A 10 feet with [address] with SBA   Wheel Chair Mobility 100 feet  feet Supervision 100 feet Supervision >200 feet Supervision >200 feet Independent   Car Transfers NT NT NT Min A SBA   Stair negotiation: ascended and descended NT NT NT 4 steps with B rail with Min A 4 steps with B rail with SBA   Curb Step:   ascended and descended NT NT NT     Picking up object off the floor NT NT NT     BLE ROM [facility] WFL      BLE Strength RLE 4/5  LLE 3+/5 RLE 4/5  LLE 3+/5 Balance  Sitting: SBA   Static stand: Max A x2 Sitting: SBA   Static stand: Max A       Date Family Teach Completed TBA TBA      Is additional Family Teaching Needed? Y or N Y Y      Hindering Progress Debility, L hemiparesis Debility, L hemiparesis      PT recommended ELOS 4-5 weeks       Team's Discharge Plan        Therapist at Team Meeting          Therapeutic Exercise:   AM:   - Standing weight shifting in parallel bars x10/side   - Standing in place side steps in parallel bars x3/side - pt had difficulty locking the knee of the the leg that was not performing the side step in order to stabilize and support the pt's body  - Ambulation in parallel bars with Mod/Max A (PT sitting in front of pt using harness and providing intermittent knee block) + WC follow - 5 feet x2 reps and 3 feet x1 rep  PM:  - Sit<>Stand x3 reps from chair in parallel bars with Max A - held each stand for as long as possible (~60 sec each rep)  - Wheelchair reverse propulsion with legs x100 feet to improve BLE strength  - Glute bridge 3x10 to improve B hip extension strength  - SAQ 3x10 to improve B knee extensor strength    Additional Comments: Pt continues to be very debilitated and requires significant assistance for all transfers and ambulation. Pt has difficulty achieving TKE and full hip extension when standing. Pt tends to shuffle his feet during ambulation and requires significant assistance from PT to keep the pt upright during swing phase on both LEs as he does not have the functional knee/hip strength to maintain stance phase independently. Will continue to progress pt as able, and future PT sessions will continue to place a heavy emphasis on transfers and ambulation.     Patient/family education  Pt/family educated on standing posture, ambulation mechanics in parallel bars    Patient/family response to education:   Pt/family verbalized understanding Pt/family demonstrated skill Pt/family requires further education in this area   yes With cues/assist yes     AM  Time in: 0915  Time out: 1000  PM   Time in: 1515  Time out: 1600    Pt is making fair progress toward established Physical Therapy goals. Continue with physical therapy current plan of care.     Charisse Lagunas DPT [id number]

## 2021-02-08 NOTE — PROGRESS NOTES
Speech Language Pathology  ACUTE REHABILITATION--DAILY PROGRESS NOTE          ADMITTING DIAGNOSIS: Acute metabolic encephalopathy [X72.35]     SPEECH PATHOLOGY DIAGNOSIS:    Mild-moderate cognitive deficit. 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 Botox received in 2008 with short term success). Decreased intelligibility due to weakness, missing dentition, fast rate and resonance issues.     THERAPY RECOMMENDATIONS:   Speech Pathology intervention is recommended 3-6 times per week for LOS or when goals are met with emphasis on the following:      Improve STM recall, sequencing and problem solving for increased functional independence with completion of ADLs/IADLs to a FIM level commensurate with modified independent.                                                                                                                                         FIMS SCORES                            Swallowing                          Current Status             1--Total Assistance                   Short Term Goal         4--Minimal Assistance               Long Term Goal          5--Supervision      Receptive                          Current Status             5--Supervision               Short Term Goal         6--Modified Independent                       Long Term Goal          6--Modified Independent              Expressive                          Current Status             5--Supervision               Short Term Goal         6--Modified Independent                       Long Term Goal          6--Modified Independent              Social Interaction                          Current Status             6--Modified Independent                       Short Term Goal         6--Modified Independent                       Long Term Goal          6--Modified Independent                                                             Problem Solving Current Status            4--Minimal Assistance               Short Term Goal         5--Supervision               Long Term Goal          6--Modified Independent                          Memory                          Current Status            4--Minimal Assistance  / 3--Moderate Assistance                     Short Term Goal         4--Minimal Assistance   Long Term Goal          5--Supervision                                              SWALLOWING:      Diet:  NPO (nothing by mouth including oral meds). PEG tube in place. Chronic dysphagia reported but inconsistent information provided by patient. Lengthy case history taken by other SLP from patient and from pulmonary NP- see MBS report. Note from 2/4: Speech Pathologist (SLP) completed education with the patient/family regarding procedure of Modified Barium Swallow Study prior to exam and then type of swallowing impairment following completion of MBSS. Reviewed current solid/liquid consistency diet recommendations (NPO (nothing by mouth including oral meds). Discussed strategies trialed (as previously used by Pt) with poor outcome and continued aspiration. Images from MBSS reviewed with patient/ family and education provided. Reviewed aspiration precautions. Encouraged patient and/or family to engage SLP in unstructured Q&A session relative to identified deficit areas; indicated understanding of all information provided via satisfactory verbal response. Patient completed tongue-base retraction exercises fair and completed laryngeal elevation exercises fair-. Patient was encouraged to complete exercises 2-3 more times today. LANGUAGE:      When describing stories from pictures cards, Patient offered a disorganized description of events. COGNITION:        Patient sequenced 6-8 picture cards displaying stories with ~60% accuracy with moderate cues.  Patient benefited from cues to look at each picture carefully before making decisions, increased time to process, and a synopsis of the story he was sequencing. Safety:  Fair-  Other therapies (PT, OT) report some concern with safety. SPEECH:     Difficult to understand at times due to facial weakness and absence of teeth. SP recommended after discharge: yes  Supervision recommended at discharge: 24 hour    Will continue SP intervention as per previously established POC.     Minute Tracking:    Individual therapy:   45 minutes  Concurrent therapy:    0 minutes  Group therapy:     0 minutes  Co-treatment therapy:    0 minutes    Total minutes for 2/8/2021: 45 minutes    Sindi Baird   SLP student intern

## 2021-02-08 NOTE — PROGRESS NOTES
Progress Note  Date:2021       Room:5505/5505-B  Patient Name:Isael Rachel     Date of Birth:     Age:76 y.o. Subjective    Subjective:  Symptoms:  Stable. He reports weakness. Diet:  NPO. Activity level: Impaired due to weakness. Pain:  He reports no pain. Review of Systems   Neurological: Positive for weakness. Objective         Vitals Last 24 Hours:  TEMPERATURE:  Temp  Av.1 °F (36.7 °C)  Min: 98.1 °F (36.7 °C)  Max: 98.1 °F (36.7 °C)  RESPIRATIONS RANGE: Resp  Av  Min: 18  Max: 18  PULSE OXIMETRY RANGE: SpO2  Av %  Min: 95 %  Max: 95 %  PULSE RANGE: Pulse  Av.5  Min: 86  Max: 95  BLOOD PRESSURE RANGE: Systolic (99KLG), ZSH:856 , Min:112 , HLI:347   ; Diastolic (45BVR), UYD:00, Min:63, Max:78    I/O (24Hr): Intake/Output Summary (Last 24 hours) at 2021 0756  Last data filed at 2021 7263  Gross per 24 hour   Intake 1218 ml   Output 550 ml   Net 668 ml     Objective:  General Appearance: In no acute distress. Vital signs: (most recent): Blood pressure 117/78, pulse 95, temperature 98.1 °F (36.7 °C), temperature source Temporal, resp. rate 18, height 6' 3\" (1.905 m), weight 212 lb 8 oz (96.4 kg), SpO2 95 %. Vital signs are normal.    Output: Producing urine and producing stool. Lungs:  Normal effort and normal respiratory rate. Breath sounds clear to auscultation. Heart: Normal rate. Regular rhythm. S1 normal and S2 normal.    Abdomen: Abdomen is soft. Bowel sounds are normal.   There is no abdominal tenderness. Extremities: Normal range of motion. Neurological: Patient is alert. (4/5 strength). Labs/Imaging/Diagnostics    Labs:  CBC:No results for input(s): WBC, RBC, HGB, HCT, MCV, RDW, PLT in the last 72 hours. CHEMISTRIES:No results for input(s): NA, K, CL, CO2, BUN, CREATININE, GLUCOSE, PHOS, MG in the last 72 hours.     Invalid input(s): CA  PT/INR:No results for input(s): PROTIME, INR in the last 72 hours.  APTT:No results for input(s): APTT in the last 72 hours. LIVER PROFILE:No results for input(s): AST, ALT, BILIDIR, BILITOT, ALKPHOS in the last 72 hours. Imaging Last 24 Hours:  No results found. Assessment//Plan           Hospital Problems           Last Modified POA    Acute metabolic encephalopathy 6/3/9496 Yes    Severe protein-calorie malnutrition (HCC) (Chronic) 2/4/2021 Yes      Functional Assessment:      Date   Status   AE    Comments     Feeding   2/3/21   Min A             Grooming   2/7/21   CGA   seated   Pt used washcloth to wash face and hands while seated in w/c           Oral Care   2/3/21   Min A             Bathing   2/7/21   UB- Min/Mod A    LB- Mod/Max simulated    Pt demo ability to reach arms, chest/stomach during simulated bathing then BLE crossover method to reach legs and feet. Pt unable to safety reach buttocks due to standing balance deficits and fall risk. UB Dressing   2/3/21   Mod A             LB Dressing   2/7/21   Mod A   reacher   Pt donned/doffed simulated pants up to thighs only using reacher with cues to keep w/c brakes locked. Footwear   2/7/21   Min/mod A       To readjust gripper socks on feet using BLE crossover method and one cue for safety in regards to locking w/c brakes. Toileting   2/3/21   Max A             Homemaking       TBA                  Functional Transfers / Balance:      Date Status DME  Comments   Sit Balance 2/7/21   Close CGA   On EOB during sliding board placement keeping feet on floor. Pt needed vc's for BUE hand/BLE placement to reposition self back into w/c at CGA/min A with vc's for follow thru. Trunk control ex's to increase core strength first using dowel shane with assistance however patient c/o arm discomfort and exercise graded to using w/c arm rests, holding knees and folding both arms (hugging self) then bending forwards/backwards. Pt tolerated 3 reps of 10 ea. No pain reported in back.     Stand Balance 2/7/21 Mod A x2   Pt required mod vc's with follow thru with simple sit to stand to adjust w/c cushion/remove pad following bed into w/c transfer. Pt kept unlocking w/c brakes at times with repositioning of BLE to prevent almost fall. []? Tub  []? Shower   Transfer 2/3/21   TBA       Commode   Transfer 2/3/21   Dependent       Functional   Mobility 2/7/21   Min A w/c Pt propelled w/c using BUE/BLE's to/from room over to gym needing Min A  when maneuvering in thru doorways or turns to increase overall strength and endurance for functional activities and ADL's    Other:  Bed>W/c        Supine to EOB    2/7/21 2/7/21    Max A x2        Mod/MaxA    Transfer board     Bed rail Pt required x2 assist for safety during EOB into w/c transfer due to hand/trunk placement on board along with decreasing retropulsion resulting in significant assistance x2 to prevent fall since kept pushing BLE outwards vs underneath him.       Supine to EOB required mod./max assist with mod vc's for hand placement and trunk positioning due to retropulsion backwards         Assessment:    Condition: In stable condition. Improving. (Metabolic encephalopathy). Plan:   Encourage ambulation. (Tolerating therapy  Still has poor balance and requires significant assistance  Still n.p.o. with the trach  Will check with pulmonary about possible decannulation).        Electronically signed by Mary Avitia MD on 2/8/21 at 7:56 AM EST

## 2021-02-08 NOTE — PROGRESS NOTES
Physical Therapy  Weekly Team Note  Evaluating Therapist: Wen Magana DPT    NAME: Julisa Cooper  ROOM: [id number]  DIAGNOSIS: Acute metabolic encephalopathy  PRECAUTIONS: Falls, PEG tube, trach (capped), L hemiparesis, LE proprioceptive deficits  HPI: 76year old male admitted to [facility] on 11/18/20 with respiratory failure secondary to witnessed aspiration event at home - chronic dysphagia with feeding tube. Intubated twice at [facility] with subsequent trach placement. Admitted to [facility] for vent weaning. Transferred to ARU 2/1. Social:  Pt lives with his son in a trailer with 8 steps with B rails or a ramp to enter. Prior to admission pt ambulated with [address] Mod I. Initial Evaluation  2/3/21 2/8/21    Comments    Short Term Goals Long Term Goals    Bed Mobility  Rolling: SBA  Supine to sit: SBA  Sit to supine: SBA  Scooting: SBA Rolling: SBA  Supine to sit: SBA  Sit to supine: SBA  Scooting: SBA  Supervision Independent   Transfers Sit to stand: Max A x2  Stand to sit: Max A x2  Stand pivot: NT  Slideboard: Max A Sit to stand: Max A  Stand to sit: Max A  Stand pivot: NT  Slideboard: Max A Limited significantly by generalized debility and BLE weakness Min A SBA   Ambulation   NT 5 feet x2 reps and 3 feet x1 rep in parallel bars with Mod/Max A + WC follow Flexed posture, bent knees, shuffles feet 25 feet with [address] with Min A >75 feet with [address] with SBA   Wheel Chair Mobility 100 feet  feet Supervision Decreased speed >200 feet Supervision >200 feet Independent   Car Transfers NT NT Unsafe at this time Min A SBA   Stair negotiation: ascended and descended NT NT Unsafe at this time 4 steps with B rail with Min A 4 steps with B rail with SBA   BLE ROM WFL WFL      BLE Strength RLE 4/5  LLE 3+/5 RLE 4/5  LLE 3+/5      Balance  Sitting: SBA   Static stand: Max A x2 Sitting: SBA   Static stand: Max A       Date Family Teach Completed TBA TBA      Is additional Family Teaching Needed?   Y or N Y Y      Hindering Progress Debility, L hemiparesis Debility, L hemiparesis      PT recommended ELOS 4-5 weeks       Team's Discharge Plan  Re-eval next week       Therapist at Team Meeting  Jesus Manuel Naqvi PT, DPT [id number]          Date:  2/8/21  Supporting factors:  Verbally states numerous times that he wants to improve and work with therapy  Barriers to discharge:  Debility, generalized weakness  Additional comments:  Pt is significantly impaired functionally due to his global debility, fatigue, and weakness. Pt is well aware of his deficits and would like to improve upon them as much as he can. Pt will need max amount of time if he is to return home even if it is at a wheelchair level.   DME:  TBD closer to discharge  After Care:  TBD closer to discharge  Jared Blanco DPT [id number]

## 2021-02-08 NOTE — PROGRESS NOTES
Vladimir Gooden M.D.,[facility]  Manoj Gerard D.O., FJARRED.OMargaritaI., Cristobal Mendoza M.D. Carmina Ruiz M.D., Ciera Hilton M.D. Anish Cueva D.O. Daily Pulmonary Progress Note    Patient:  Pallavi Quiñones 68 y.o. male MRN: [id number]     Date of Service: 2/8/2021      Synopsis     We are following patient for Yojana Kalata failure    \"CC\" cough    Code status: Full      Subjective      Patient was seen and examined. Patient appears no acute just she is sitting in his chair and Occupational Therapy. He reports to me today that over the last several days he has noticed that his secretions have become a bit thicker they are still clear to white but he is having more difficulty expectorating. I will decrease his Robinul. I discussed with him the long history of aspiration, and decannulation at this time will hold. Review of Systems:  Constitutional: Denies fever, weight loss, night sweats, and fatigue  Skin: Denies pigmentation, dark lesions, and rashes   HEENT: Denies hearing loss, tinnitus, ear drainage, epistaxis, sore throat, and hoarseness. Cardiovascular: Denies palpitations, chest pain, and chest pressure. Respiratory: + cough, dyspnea at rest, hemoptysis, apnea, and choking.   Gastrointestinal: Denies nausea, vomiting, poor appetite, diarrhea, heartburn or reflux  Genitourinary: Denies dysuria, frequency, urgency or hematuria  Musculoskeletal: Denies myalgias, muscle weakness, and bone pain  Neurological: Denies dizziness, vertigo, headache, and focal weakness  Psychological: Denies anxiety and depression  Endocrine: Denies heat intolerance and cold intolerance  Hematopoietic/Lymphatic: Denies bleeding problems and blood transfusions    24-hour events:   cough     Objective   Vitals: /78   Pulse 95   Temp 98.5 °F (36.9 °C) (Temporal)   Resp 17   Ht 6' 3\" (1.905 m)   Wt 212 lb 8 oz (96.4 kg)   SpO2 98%   BMI 26.56 kg/m²     I/O:    Intake/Output Summary (Last 24 hours) at 2/8/2021 1418  Last data filed at 2/8/2021 5116  Gross per 24 hour   Intake 920 ml   Output 350 ml   Net 570 ml       Vent Information  SpO2: 98 %                CURRENT MEDS :  Scheduled Meds:   lubrifresh P.M. Left Eye Nightly    polyvinyl alcohol  1 drop Both Eyes 4x Daily    albuterol  2.5 mg Nebulization BID    apixaban  5 mg Per G Tube BID    aspirin  81 mg PEG Tube Daily    atorvastatin  20 mg Oral Nightly    chlorhexidine  15 mL Mouth/Throat BID    ciprofloxacin  1 drop Left Eye 4 times per day    glycopyrrolate  1 mg PEG Tube BID    ipratropium-albuterol  1 ampule Inhalation Q4H WA    metoprolol tartrate  25 mg Oral BID    pantoprazole  40 mg Oral QAM AC    polyethylene glycol  17 g Per G Tube Daily    rifaximin  550 mg PEG Tube BID       Physical Exam:  General Appearance: appears comfortable in no acute distress. HEENT: Normocephalic atraumatic without obvious abnormality   Neck: Lips, mucosa, and tongue normal.  Supple, symmetrical, trachea midline, no adenopathy;thyroid:  no enlargement/tenderness/nodules or JVD. Lung: Breath sounds CTA. Respirations   unlabored. Symmetrical expansion. Heart: RRR, normal S1, S2. No MRG  Abdomen: Soft, NT, ND. BS present x 4 quadrants. No bruit or organomegaly. Extremities: Pedal pulses 2+ symmetric b/l. Extremities normal, no cyanosis, clubbing, or edema. Musculokeletal: No joint swelling, no muscle tenderness. ROM normal in all joints of extremities. Neurologic: Mental status: Alert and Oriented X3 .     Pertinent/ New Labs and Imaging Studies     Imaging Personally Reviewed:      ECHO      Labs:  Lab Results   Component Value Date    WBC 3.5 02/03/2021    HGB 13.6 02/03/2021    HCT 41.5 02/03/2021    MCV 84.7 02/03/2021    MCH 27.8 02/03/2021    MCHC 32.8 02/03/2021    RDW 15.8 02/03/2021     02/03/2021    MPV 11.8 02/03/2021     Lab Results   Component Value Date     02/03/2021    K 4.4 02/03/2021     02/03/2021    CO2 regimen albuterol twice daily, saline nebs 3% as needed. Robinul twice daily for secretions. Decrease to daily   8. Speech therapy-barium swallow 2/4/2021 aspiration, recommendation n.p.o., continue feeding with PEG  9. PT OT  10. Nutritional support tube feeds via PEG tube  11. Bowel regimen  12. GI prophylaxis  13. Considering patient's history of trach x2, continued dysphagia, aspiration history and difficulty clearing secretions, multiple lung infections, failed swallow study and hypercapnia would not recommend decannulation at this time. Patient does report to me today he is having thicker secretions with difficulty expectorating I will decrease the Robinul to 1/day      This plan of care was reviewed in collaboration with Dr. Luly Huang  Electronically signed by PYATON Melendez on 2/8/2021 at 2:18 PM    I personally saw, examined, and cared for the patient. Labs, medications, radiographs reviewed. I agree with history exam and plans detailed in NP note. Steffen Cool M.D.    Pulmonary/Critical Care Medicine

## 2021-02-09 LAB
ANION GAP SERPL CALCULATED.3IONS-SCNC: 13 MMOL/L (ref 7–16)
BUN BLDV-MCNC: 25 MG/DL (ref 8–23)
CALCIUM SERPL-MCNC: 9.3 MG/DL (ref 8.6–10.2)
CHLORIDE BLD-SCNC: 102 MMOL/L (ref 98–107)
CO2: 25 MMOL/L (ref 22–29)
CREAT SERPL-MCNC: 0.7 MG/DL (ref 0.7–1.2)
GFR AFRICAN AMERICAN: >60
GFR NON-AFRICAN AMERICAN: >60 ML/MIN/1.73
GLUCOSE BLD-MCNC: 79 MG/DL (ref 74–99)
POTASSIUM SERPL-SCNC: 4.8 MMOL/L (ref 3.5–5)
SODIUM BLD-SCNC: 140 MMOL/L (ref 132–146)

## 2021-02-09 PROCEDURE — 6370000000 HC RX 637 (ALT 250 FOR IP): Performed by: PHYSICAL MEDICINE & REHABILITATION

## 2021-02-09 PROCEDURE — 97110 THERAPEUTIC EXERCISES: CPT

## 2021-02-09 PROCEDURE — 6370000000 HC RX 637 (ALT 250 FOR IP): Performed by: CLINICAL NURSE SPECIALIST

## 2021-02-09 PROCEDURE — 1280000000 HC REHAB R&B

## 2021-02-09 PROCEDURE — 80048 BASIC METABOLIC PNL TOTAL CA: CPT

## 2021-02-09 PROCEDURE — 97535 SELF CARE MNGMENT TRAINING: CPT

## 2021-02-09 PROCEDURE — 97530 THERAPEUTIC ACTIVITIES: CPT

## 2021-02-09 PROCEDURE — 36415 COLL VENOUS BLD VENIPUNCTURE: CPT

## 2021-02-09 PROCEDURE — 92526 ORAL FUNCTION THERAPY: CPT | Performed by: SPEECH-LANGUAGE PATHOLOGIST

## 2021-02-09 PROCEDURE — 94640 AIRWAY INHALATION TREATMENT: CPT

## 2021-02-09 PROCEDURE — 97129 THER IVNTJ 1ST 15 MIN: CPT | Performed by: SPEECH-LANGUAGE PATHOLOGIST

## 2021-02-09 RX ADMIN — POLYVINYL ALCOHOL 1 DROP: 14 SOLUTION/ DROPS OPHTHALMIC at 21:37

## 2021-02-09 RX ADMIN — RIFAXIMIN 550 MG: 550 TABLET ORAL at 07:46

## 2021-02-09 RX ADMIN — ATORVASTATIN CALCIUM 20 MG: 20 TABLET, FILM COATED ORAL at 21:37

## 2021-02-09 RX ADMIN — CHLORHEXIDINE GLUCONATE 0.12% ORAL RINSE 15 ML: 1.2 LIQUID ORAL at 21:41

## 2021-02-09 RX ADMIN — MINERAL OIL AND WHITE PETROLATUM: 150; 830 OINTMENT OPHTHALMIC at 21:37

## 2021-02-09 RX ADMIN — CIPROFLOXACIN HYDROCHLORIDE 1 DROP: 3 SOLUTION/ DROPS OPHTHALMIC at 06:40

## 2021-02-09 RX ADMIN — CHLORHEXIDINE GLUCONATE 0.12% ORAL RINSE 15 ML: 1.2 LIQUID ORAL at 07:46

## 2021-02-09 RX ADMIN — IPRATROPIUM BROMIDE AND ALBUTEROL SULFATE 1 AMPULE: 2.5; .5 SOLUTION RESPIRATORY (INHALATION) at 21:19

## 2021-02-09 RX ADMIN — POLYVINYL ALCOHOL 1 DROP: 14 SOLUTION/ DROPS OPHTHALMIC at 16:13

## 2021-02-09 RX ADMIN — POLYVINYL ALCOHOL 1 DROP: 14 SOLUTION/ DROPS OPHTHALMIC at 11:50

## 2021-02-09 RX ADMIN — RIFAXIMIN 550 MG: 550 TABLET ORAL at 21:37

## 2021-02-09 RX ADMIN — METOPROLOL TARTRATE 25 MG: 25 TABLET, FILM COATED ORAL at 07:46

## 2021-02-09 RX ADMIN — APIXABAN 5 MG: 5 TABLET, FILM COATED ORAL at 07:46

## 2021-02-09 RX ADMIN — ASPIRIN 81 MG: 81 TABLET, CHEWABLE ORAL at 07:46

## 2021-02-09 RX ADMIN — POLYVINYL ALCOHOL 1 DROP: 14 SOLUTION/ DROPS OPHTHALMIC at 07:47

## 2021-02-09 RX ADMIN — GLYCOPYRROLATE 1 MG: 1 TABLET ORAL at 07:46

## 2021-02-09 RX ADMIN — IPRATROPIUM BROMIDE AND ALBUTEROL SULFATE 1 AMPULE: 2.5; .5 SOLUTION RESPIRATORY (INHALATION) at 12:55

## 2021-02-09 RX ADMIN — PANTOPRAZOLE SODIUM 40 MG: 40 TABLET, DELAYED RELEASE ORAL at 06:19

## 2021-02-09 RX ADMIN — METOPROLOL TARTRATE 25 MG: 25 TABLET, FILM COATED ORAL at 21:37

## 2021-02-09 RX ADMIN — APIXABAN 5 MG: 5 TABLET, FILM COATED ORAL at 21:37

## 2021-02-09 RX ADMIN — CIPROFLOXACIN HYDROCHLORIDE 1 DROP: 3 SOLUTION/ DROPS OPHTHALMIC at 06:18

## 2021-02-09 RX ADMIN — IPRATROPIUM BROMIDE AND ALBUTEROL SULFATE 1 AMPULE: 2.5; .5 SOLUTION RESPIRATORY (INHALATION) at 16:40

## 2021-02-09 ASSESSMENT — PAIN SCALES - GENERAL
PAINLEVEL_OUTOF10: 0

## 2021-02-09 NOTE — PROGRESS NOTES
OCCUPATIONAL THERAPY DAILY NOTE    Date:2021  Patient Name: Tano Rodriguez  MRN: [id number]  : 1945  Room: [address] [place] [address]-B     Diagnosis: RF secondary to aspiration @ home, trach & peg  Additional Pertinent Hx: CHF, COPD, hx CVA, dysphagia, peg heart disease  Precautions: Falls, NPO, Peg    Functional Assessment:   Date Status AE  Comments   Feeding 21 NPO     Grooming 21 CGA seated          Oral Care 2/3/21 Min A     Bathing 21 Mod A Seated    UB Dressing 21 Min A     LB Dressing 21 Max/Mod A reacher Pt don/doff pants seated in w/c to knee level with reacher with assist required to pull on pants to thread LE's into pants. Mod cues for technique to utilize AE provided with fair follow through. Footwear 21 Mod A   To don/doff socks seated in w/c utilizing modified leg cross over technique. Assist required to initiate over L toes and to cross over L LE. Pt reports no shoes present at this time to assess. Toileting 2/3/21 Max A     Homemaking  TBA       Functional Transfers / Balance:   Date Status DME  Comments   Sit Balance 21 Min/CGA  Unsupported sitting in w/c during dynamic tasks and seated at EOB. Stand Balance 21 Mod A x2     [] Tub  [] Shower   Transfer  TBA-  Pt sponge bathed at EOB PTA. Commode   Transfer 21 Max A Bariatric 3-in-1, transfer board    Functional   Mobility 21 Min A w/c Maneuvering w/c in OT clinic. Pt required assist to safely maneuver around environmental obstacles in more crowded area. Other:  Bed>W/c      Supine to EOB   21      2   Max A       Min/CGA   Transfer board    Bed rail   Min cues for sequencing/safety required. Functional Exercises / Activity:  -Medium resistance putty/wheel exercise completed seated at table with focus on increasing B UE strength and overall endurance for functional activities.  Fair+ tolerance.   -Red Candobar completed x40 in all planes to increase B UE strength/endurance for functional activities. Fair tolerance.   -Peg board activity completed on incline with focus on functional reaching, trunk control and core strength. Min rest breaks required to complete.   -Towel stretches completed on incline to increase B UE AROM/strength and as a trunk control/core strength activity. Pt demo'd fair+ tolerance with R UE, Fair- tolerance wit L UE. Sensory / Neuromuscular Re-Education:      Cognitive Skills:   Status Comments   Problem   Solving fair  Demo'd during ADL tasks. Memory fair  \"   Sequencing fair  Cues required during transfers and ADL tasks. Safety fair - Cues for safety required during transfers. Visual Perception:    Education:  Pt educated on adaptive techniques for LB Dressing and sequencing/safety during transfer board transfers. Pt verbalized/demonstrated fair- understanding, recommend continued education. [] Family teach completed on:    Pain Level: No c/o pain. Additional Notes:       Patient has made fair  progress during treatment sessions toward set goals.  Therapy emphasis to obtain goals:  Current Treatment Recommendations: Strengthening, Gait Training, Patient/Caregiver Education & Training, Home Management Training, ROM, Equipment Evaluation, Education, & procurement, Balance Training, Functional Mobility Training, Endurance Training, Wheelchair Mobility Training, Safety Education & Training, Self-Care / ADL     Long term goal 1: Pt demo Mod I to eat all meals  Long term goal 2: Pt demo Mod I grooiming seated  Long term goal 3: Pt demo Min A bathing @ sink level or shower level seated  Long term goal 4: Pt demo s/u UE & Min A LE dress to don depends, pants, socks & shoes using AE as needed  Long term goal 5: Pt demo Min-Mod  A commode trf wtih appropriate DME to ensure pt safety  Long term goals 6: Pt demo Min-Mod A tub trf with appropriate DME to ensure pt safety  Long term goal 7: Pt demo Sup light homemaking activity @ wc level  Long term goal 8: Pt demo G- endurance for a 20 minute funcitonal activity  Long term goal 9: Pt demo increase BUE strength to 4+/5 to improve pt ability to complete ADLs  Long term goal 10: Pt demo Mod I wc propulsion throughout a living environment & around obstacles    [x] Continue with current OT Plan of care.   [] Prepare for Discharge     DISCHARGE RECOMMENDATIONS  Recommended DME: TBA    Post Discharge Care:   []Home Independently  [x]Home with 24hr Care / Supervision []Home with Partial Supervision []Home with Home Health OT []Home with Out Pt OT []Other: ___   Comments:         Time in Time out Tx Time Breakdown  Variance:   First Session  0830 0915 [x] Individual Tx-45 Mins  [] Concurrent Tx -   [] Co-Tx -   [] Group Tx -   [] Time Missed -     Second Session [phone] [x] Individual Tx- 45 Mins  [] Concurrent Tx -  [] Co-Tx -   [] Group Tx -   [] Time Missed -     Third Session    [] Individual Tx-   [] Concurrent Tx -  [] Co-Tx -   [] Group Tx -   [] Time Missed -         Total Tx Time: 90 Mins      Mike Jean-Paul ABDULLAHI/L [id number]

## 2021-02-09 NOTE — PROGRESS NOTES
Speech Language Pathology  ACUTE REHABILITATION--DAILY PROGRESS NOTE          ADMITTING DIAGNOSIS: Acute metabolic encephalopathy [H09.54]     SPEECH PATHOLOGY DIAGNOSIS:    Mild-moderate cognitive deficit. 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 Botox received in 2008 with short term success). Decreased intelligibility due to weakness, missing dentition, fast rate and resonance issues.     THERAPY RECOMMENDATIONS:   Speech Pathology intervention is recommended 3-6 times per week for LOS or when goals are met with emphasis on the following:      Improve STM recall, sequencing and problem solving for increased functional independence with completion of ADLs/IADLs to a FIM level commensurate with modified independent.                                                                                                                                         FIMS SCORES                            Swallowing                          Current Status             1--Total Assistance                   Short Term Goal         4--Minimal Assistance               Long Term Goal          5--Supervision      Receptive                          Current Status             5--Supervision               Short Term Goal         6--Modified Independent                       Long Term Goal          6--Modified Independent              Expressive                          Current Status             5--Supervision               Short Term Goal         6--Modified Independent                       Long Term Goal          6--Modified Independent              Social Interaction                          Current Status             6--Modified Independent                       Short Term Goal         6--Modified Independent                       Long Term Goal          6--Modified Independent                                                             Problem Solving

## 2021-02-09 NOTE — PROGRESS NOTES
Ni, speech therapist, brought to nursing's attention that patient's son has concerns about his father and food. He is currently NPO and the son feels the patient will get desperate enough to steal roommates food. Apparently at home he would bug the home health nurses and aides to the point that they would tell him just to eat.

## 2021-02-09 NOTE — CARE COORDINATION
Per team meeting:  Re-eval (2-3 weeks). Updated patient and son Pamela Paez. Patient requires verbal cues for safety. He is limited by weakness and debility. Goals range from Min to Sup. Per son - confirmed that he assisted patient in all areas. Pamela Paez also assists his grandmother (patient mother in law) who has Alzheimers. She is able able to be left alone for short periods of time. Patient has a PEG tube which son was caring for. Son reports at d/c - he will private pay for [facility] to transport patient home since he has a F150. DME/Aftercare - TBE. FT 2/10 pm.    Requested SP contact patient son as he had some concerns with swallow.     Shawn Daniel

## 2021-02-09 NOTE — PATIENT CARE CONFERENCE
[address] [place] [address]  ACUTE REHABILITATION  TEAM CONFERENCE NOTE/PATIENT PLAN OF CARE    Date: 2021  Admission date: 2021  Patient Name: Cheyenne Zhu        MRN: [id number]    : 1945  (77 y.o.)  Gender: male   Rehab diagnosis/surgery with date:  metabolic encephalopathy  Impairment Group Code:  2.1      MEDICAL/FUNCTIONAL HISTORY/STATUS:  has a trach, per pulmonary, no plans to decannulate at this time due to past history of lung infections, remains NPO with tube feedings, will progress to bolus feedings, flexed posture, therapies cannot get him upright    Consultations/Labs/X-rays: as above, pulmonary following  Results for [facility] (MRN [id number]) as of 2021 12:40   Ref. Range 2021 10:10   Sodium Latest Ref Range: 132 - 146 mmol/L 140   Potassium Latest Ref Range: 3.5 - 5.0 mmol/L 4.8   Chloride Latest Ref Range: 98 - 107 mmol/L 102   CO2 Latest Ref Range: 22 - 29 mmol/L 25   BUN Latest Ref Range: 8 - 23 mg/dL 25 (H)   Creatinine Latest Ref Range: 0.7 - 1.2 mg/dL 0.7       MEDICATION UPDATE:  Robinul er pulmonary, Ciloxan ophthalmic solution stopped      NURSING :    Bowel:   Always Continent  [x]   Occasionally incontinent  []   Frequently incontinent  []   Always incontinent  []   Not occurred  []     Bladder:   Always Continent  [x]  urinal  Incontinent less than daily[]   Incontinent  daily []   Always incontinent  []   No urine output    []   Indwelling catheter []       Toilet Hygiene:   Veronika Brewer. 1nt level : dependent  Short term Toilet hygiene goal: max assist  Long term toilet hygiene goal:  min assist-mod assist    Skin integrity: right tibia wound  Pain: left shoulder pain , tylenol effective    NUTRITION    Diet  NPO,  65 cc hour for 20 hours  Liquid consistency   NA    SOCIAL INFORMATION:  Lives with: son  Prior community services:  none  Home Architecture:  mobilie home, 2 entry, rail and ramp  Prior Level of function:  assistance with all-was on PEG feedings at home prior to admission  DME:  wheelchair , wheeled walker    FAMILY / PATIENT EDUCATION:  will have son come in this week    PHYSICAL THERAPY    Bed mobility:   Current level: standby assist  Short term bed mobility goal: supervision  Long term bed mobility goal: Modified independent    Chair/bed transfers:  Current level: max assist, transfer board  is max assist, weakness hinders  Short term Chair/bed transfers goal: min assist  Long term Chair/bed transfers goal: standby assist      Ambulation:   Current level: 5 ft in parallel bars  at mod assist-max assist , wheelchair follows, shuffled gait  Short term ambulation goal: 25 ft at min assist  Long term ambulation goal: 75 ft at standby assist    Wheelchair Mobility:  Current level: 100 ft at supervision  Short term wheelchair goal: 200 ft at supervision  Long term wheelchair goal: 200 at Modified independent      Car transfers:   Current level: to be assessed    Stairs:   Current level : to be assessed    Lower Extremity Strength Issues:  right is 4/5, left is 3+/5    Other comments: sit balance standby assist      OCCUPATIONAL THERAPY:      Tub/shower:   Current level: NA    Feeding:  Current level: NA    Grooming:   Current level: min assist seated  Short term grooming goal: Contact guard assist  Long term grooming goal: supervision    Bathing:  Current level: mod assist at edge of bed  Short term bathing goal: min assist  Long term bathing goal: min assist    Homemaking:   Current level: to be assessed    Upper body dressing:  Current level: min assist  Short term upper body dressing goal: supervision  Long term upper body dressing goal: supervision    Lower body dressing:                                                                          Current level: max assist             Short term lower body dressing goal: mod assist          Long term lower body dressing goal: mod assist            Footwear  Current level: mod assist  Short term goal: min assist  Long term goal:min assist      Toilet transfer:   Current level: max assist  Short term toilet transfer goal: mod assist  Long term toilet transfer goal: mod assist      SPEECH THERAPY  Swallowing:  Current level:  dependent  Short term swallowing goal: min assist  Long term swallowing Goal: supervision    Comprehension:   Current level: supervision  Short term comprehension goal: Modified independent  Long term comprehension goal: Modified independent    Expression:   Current level: supervision  Short term expression goal: Modified independent  Long term expression goal: Modified independent    Problem solving:   Current level: min assist  Short term problem solving goal: supervision  Long term problem solving goal: Modified independent    Memory:  Current level: min assist-mod assist  Short term memory goal: min assist  Long term memory goal: supervision    Social interaction:  Modified independent    Safety awareness: fair -      Patient/family's personal goals: \"go home with my son\"  Factors supporting goal achievement:  supportive son  Factors hindering goal achievement:  need for long-term trach      Discharge Plan   Estimated Length of Stay: 4-5 weeks            Destination: home  Services at Discharge: to be assessed  Equipment at Discharge: to be assessed      INTERDISCIPLINARY TEAM/PHYSICIAN RECOMMENDATION AND/OR REVISIONS OF PLAN OF CARE:  start education with son      I approve the established interdisciplinary plan of care as documented within the medical record of Isael Rachel. Electronically signed by Nuha Gilbert RN  on 2/9/2021 at 8:59 AM  The following interdisciplinary team members were present:  ALLYN Saldana MSSA,LSW  Manny Madrid.  Kati Co, DPT  Trey George, OTR  Melven Neighbor, Luite Reuben [address], CCC-SLP

## 2021-02-09 NOTE — PROGRESS NOTES
Nutrition Assessment     Type and Reason for Visit: Reassess    Nutrition Recommendations/Plan: Recommend modifying current EN as it is exceeding patient's needs, and pt does not need fiber free formula. Per physician note, plan for switch to bolus recs. Bolus Recs provided below.    -Bolus feeds Standard with fiber 1.5 kcal: 240 ml feeds x 5 feedings daily + protein modular BID = 1200 ml tv, 2000 kcal,129 g protein, 912 ml free water. Note    -Should pt continue to receive cyclic EN recommend: 1.5 Calorie w/ Fiber @ 60 ml/hr x 20 hrs/day  Add 2 protein modulars daily  Will provide 1200 ml tv, 1800 kcals, 77 gm pro, 912 ml free water (2000 kcals, 129 gm pro w/ pro mods)  Regimen will meet 100% est nutrient needs    Nutrition Assessment:  Pt admit w/ acute metabolic encephalopathy, severe malnutrition w/ dyspahgia currently on cyclic PEG feeds. Noted trach. Noted wound. Will provide updated EN recs. Malnutrition Assessment:  Malnutrition Status: Severe malnutrition    Estimated Daily Nutrient Needs:  Energy (kcal): ; Weight Used for Energy Requirements:  Current     Protein (g): 115-130; Weight Used for Protein Requirements:  Ideal(1.3-1.5)        Fluid (ml/day): ; Weight Used for Fluid Requirements:  1 ml/kcal      Nutrition Related Findings: A/Ox4, trach, PEG, +BS, trace edema,+I/O's      Current Nutrition Therapies:    DIET TUBE FEED CONTINUOUS/CYCLIC NPO; Other Tube Feeding (must specify product in comment) (Osmolite 1.5);  Gastrostomy; Continuous; 100; 16    Anthropometric Measures:  · Height: 6' 3\" (190.5 cm)  · Current Body Wt: 212 lb (96.2 kg)(2/2)   · BMI: 26.5    Nutrition Diagnosis:   · Inadequate oral intake related to cognitive or neurological impairment as evidenced by NPO or clear liquid status due to medical condition, nutrition support - enteral nutrition    Nutrition Interventions:   Food and/or Nutrient Delivery:  Continue NPO, Modify Tube Feeding  Nutrition Education/Counseling:  Education not indicated   Coordination of Nutrition Care:  Continue to monitor while inpatient    Goals:   Tolerance to EN       Nutrition Monitoring and Evaluation:   Behavioral-Environmental Outcomes:  None Identified   Food/Nutrient Intake Outcomes:  Enteral Nutrition Intake/Tolerance  Physical Signs/Symptoms Outcomes:  Fluid Status or Edema, Hemodynamic Status, GI Status, Chewing or Swallowing, Biochemical Data, Nutrition Focused Physical Findings, Skin, Weight     Discharge Planning:    Enteral Nutrition     Electronically signed by Jimi Kuhn RD, LD on 2/9/21 at 2:32 PM EST    Contact: [phone]

## 2021-02-09 NOTE — PROGRESS NOTES
Physical Therapy  Treatment Note  Evaluating Therapist: Sherly Hay DPT    NAME: Giselle Aleman  ROOM: 9486S  DIAGNOSIS: Acute metabolic encephalopathy  PRECAUTIONS: Falls, PEG tube, trach (capped), L hemiparesis, LE proprioceptive deficits  HPI: 76year old male admitted to [facility] on 11/18/20 with respiratory failure secondary to witnessed aspiration event at home - chronic dysphagia with feeding tube. Intubated twice at [address] with subsequent trach placement. Admitted to [facility] for vent weaning. Transferred to ARU 2/1. Social:  Pt lives with his son in a trailer with 8 steps with B rails or a ramp to enter. Prior to admission pt ambulated with [employer] Mod I. Initial Evaluation  2/3/21 AM     PM    Short Term Goals Long Term Goals    Was pt agreeable to Eval/treatment? yes yes yes     Does pt have pain? L wrist and R shoulder pain L wrist and B shoulder pain L wrist and B shoulder pain     Bed Mobility  Rolling: SBA  Supine to sit: SBA  Sit to supine: SBA  Scooting: SBA NT - pt found and left in chair SBA all aspects Supervision Independent   Transfers Sit to stand: Max A x2  Stand to sit: Max A x2  Stand pivot: NT  Slideboard: Max A Sit to stand: Max A  Stand to sit: Max A  Stand pivot: NT  Slideboard: Max A Sit to stand: Max A  Stand to sit: Max A  Stand pivot: NT  Slideboard:  Max A Min A SBA   Ambulation   NT See TE below See TE below 25 feet with [employer] with Min A >75 feet with [employer] with SBA   Walking 10 feet on uneven surface NT NT NT 10 feet with [employer] with Min A 10 feet with [employer] with SBA   Wheel Chair Mobility 100 feet  feet Supervision 150 feet Supervision >200 feet Supervision >200 feet Independent   Car Transfers NT NT NT Min A SBA   Stair negotiation: ascended and descended NT NT NT 4 steps with B rail with Min A 4 steps with B rail with SBA   Curb Step:   ascended and descended NT NT NT     Picking up object off the floor NT NT NT     BLE ROM [facility] WFL      BLE Strength RLE 4/5  LLE 3+/5 RLE 4/5  LLE 3+/5 Balance  Sitting: SBA   Static stand: Max A x2 Sitting: SBA   Static stand: Max A       Date Family Teach Completed TBA TBA      Is additional Family Teaching Needed? Y or N Y Y      Hindering Progress Debility, L hemiparesis Debility, L hemiparesis      PT recommended ELOS 4-5 weeks       Team's Discharge Plan        Therapist at Team Meeting          Therapeutic Exercise:   AM:   - Standing weight shifting in parallel bars x10/side   - Ambulation in parallel bars with Mod/Max A (PT sitting in front of pt using harness and providing intermittent knee block) + WC follow - 5 feet x4 reps  PM:  - Ambulation in parallel bars with Mod/Max A (PT sitting in front of pt using harness and providing intermittent knee block) + WC follow - 5 feet x2 reps  - Sit<>Stand x3 reps from chair in parallel bars with Max A - held each stand for as long as possible (~60 sec each rep)  - Standing LLE side step out x5 reps in parallel bars - attempted RLE step outs however pt unable to perform this action  - Seated manual resisted single leg hip/knee extension 2x15/side to improve hip and knee extensor strength  - Seated manual resisted hip abduction x30 reps to improve B hip strength    Additional Comments: Pt continues to be very debilitated and requires significant assistance for all transfers and ambulation. Placing a heavy emphasis on transfers and ambulation today in effort to improve his overall function and strength. Pt ambulates with a forward flexed posture, flexed knees, and a shuffling gait. Unfortunately due to the pt's significant level of debility and impaired strength/function he has made limited progress overall in terms of progression toward goals and PLOF.     Patient/family education  Pt/family educated on standing posture, ambulation mechanics in parallel bars    Patient/family response to education:   Pt/family verbalized understanding Pt/family demonstrated skill Pt/family requires further education in this area yes With cues/assist yes     AM  Time in: 0915  Time out: 1000  PM   Time in: 1515  Time out: 1600    Pt is making fair progress toward established Physical Therapy goals. Continue with physical therapy current plan of care.     Chantel Desai, DPT [id number]

## 2021-02-09 NOTE — PROGRESS NOTES
Progress Note  Date:2021       Room:5505/5505-  Patient Name:Isael Rachel     Date of Birth:     Age:76 y.o. Subjective    Subjective:  Symptoms:  Stable. He reports weakness. Diet:  NPO. Activity level: Impaired due to weakness. Pain:  He reports no pain. Review of Systems   Neurological: Positive for weakness. Objective         Vitals Last 24 Hours:  TEMPERATURE:  Temp  Av.7 °F (37.1 °C)  Min: 98.7 °F (37.1 °C)  Max: 98.7 °F (37.1 °C)  RESPIRATIONS RANGE: Resp  Av  Min: 18  Max: 18  PULSE OXIMETRY RANGE: SpO2  Av.3 %  Min: 94 %  Max: 98 %  PULSE RANGE: Pulse  Av.5  Min: 82  Max: 83  BLOOD PRESSURE RANGE: Systolic (56MVR), BHL:372 , Min:106 , BA   ; Diastolic (90CKE), JCT:98, Min:61, Max:71    I/O (24Hr): Intake/Output Summary (Last 24 hours) at 2021 0808  Last data filed at 2021 0243  Gross per 24 hour   Intake 0 ml   Output --   Net 0 ml     Objective:  General Appearance: In no acute distress. Vital signs: (most recent): Blood pressure 106/61, pulse 82, temperature 98.7 °F (37.1 °C), temperature source Temporal, resp. rate 18, height 6' 3\" (1.905 m), weight 212 lb 8 oz (96.4 kg), SpO2 98 %. Vital signs are normal.    Output: Producing urine and producing stool. Lungs:  Normal effort and normal respiratory rate. Breath sounds clear to auscultation. Heart: Normal rate. Regular rhythm. S1 normal and S2 normal.    Abdomen: Abdomen is soft. Bowel sounds are normal.   There is no abdominal tenderness. Extremities: Normal range of motion. Neurological: Patient is alert. (4/5 strength). Labs/Imaging/Diagnostics    Labs:  CBC:No results for input(s): WBC, RBC, HGB, HCT, MCV, RDW, PLT in the last 72 hours. CHEMISTRIES:No results for input(s): NA, K, CL, CO2, BUN, CREATININE, GLUCOSE, PHOS, MG in the last 72 hours. Invalid input(s): CA  PT/INR:No results for input(s): PROTIME, INR in the last 72 hours.   APTT:No results for input(s): APTT in the last 72 hours. LIVER PROFILE:No results for input(s): AST, ALT, BILIDIR, BILITOT, ALKPHOS in the last 72 hours. Imaging Last 24 Hours:  No results found. Assessment//Plan           Hospital Problems           Last Modified POA    Acute metabolic encephalopathy 2/8/6651 Yes    Severe protein-calorie malnutrition (HCC) (Chronic) 2/4/2021 Yes        Assessment:    Condition: In stable condition. Improving. (Metabolic encephalopathy). Plan:   Encourage ambulation. (Improving in therapy  Was reevaluated by pulmonary yesterday  In light of his previous need for trach and recurrent aspiration he is not planning decannulation right now  It looks like we will be continuing n.p.o. with the tube feedings  We will probably switch to bolus  We will plan on leaving the trach at this point  I will address that further with the therapists in team today).        Electronically signed by Robel Gaines MD on 2/9/21 at 8:08 AM EST

## 2021-02-10 PROCEDURE — 94640 AIRWAY INHALATION TREATMENT: CPT

## 2021-02-10 PROCEDURE — 6370000000 HC RX 637 (ALT 250 FOR IP): Performed by: PHYSICAL MEDICINE & REHABILITATION

## 2021-02-10 PROCEDURE — 97530 THERAPEUTIC ACTIVITIES: CPT

## 2021-02-10 PROCEDURE — 97129 THER IVNTJ 1ST 15 MIN: CPT

## 2021-02-10 PROCEDURE — 6370000000 HC RX 637 (ALT 250 FOR IP): Performed by: CLINICAL NURSE SPECIALIST

## 2021-02-10 PROCEDURE — 1280000000 HC REHAB R&B

## 2021-02-10 PROCEDURE — 94664 DEMO&/EVAL PT USE INHALER: CPT

## 2021-02-10 PROCEDURE — 97130 THER IVNTJ EA ADDL 15 MIN: CPT

## 2021-02-10 PROCEDURE — 97535 SELF CARE MNGMENT TRAINING: CPT

## 2021-02-10 PROCEDURE — 97110 THERAPEUTIC EXERCISES: CPT

## 2021-02-10 PROCEDURE — 2580000003 HC RX 258: Performed by: PHYSICAL MEDICINE & REHABILITATION

## 2021-02-10 PROCEDURE — 92526 ORAL FUNCTION THERAPY: CPT

## 2021-02-10 RX ADMIN — METOPROLOL TARTRATE 25 MG: 25 TABLET, FILM COATED ORAL at 19:46

## 2021-02-10 RX ADMIN — RIFAXIMIN 550 MG: 550 TABLET ORAL at 08:25

## 2021-02-10 RX ADMIN — SODIUM CHLORIDE, PRESERVATIVE FREE 10 ML: 5 INJECTION INTRAVENOUS at 19:49

## 2021-02-10 RX ADMIN — APIXABAN 5 MG: 5 TABLET, FILM COATED ORAL at 19:45

## 2021-02-10 RX ADMIN — ATORVASTATIN CALCIUM 20 MG: 20 TABLET, FILM COATED ORAL at 19:46

## 2021-02-10 RX ADMIN — APIXABAN 5 MG: 5 TABLET, FILM COATED ORAL at 08:23

## 2021-02-10 RX ADMIN — IPRATROPIUM BROMIDE AND ALBUTEROL SULFATE 1 AMPULE: 2.5; .5 SOLUTION RESPIRATORY (INHALATION) at 12:27

## 2021-02-10 RX ADMIN — METOPROLOL TARTRATE 25 MG: 25 TABLET, FILM COATED ORAL at 08:23

## 2021-02-10 RX ADMIN — ACETAMINOPHEN 650 MG: 325 TABLET, FILM COATED ORAL at 19:47

## 2021-02-10 RX ADMIN — POLYVINYL ALCOHOL 1 DROP: 14 SOLUTION/ DROPS OPHTHALMIC at 13:00

## 2021-02-10 RX ADMIN — CHLORHEXIDINE GLUCONATE 0.12% ORAL RINSE 15 ML: 1.2 LIQUID ORAL at 19:46

## 2021-02-10 RX ADMIN — ACETAMINOPHEN 650 MG: 325 TABLET, FILM COATED ORAL at 08:22

## 2021-02-10 RX ADMIN — POLYVINYL ALCOHOL 1 DROP: 14 SOLUTION/ DROPS OPHTHALMIC at 08:23

## 2021-02-10 RX ADMIN — POLYVINYL ALCOHOL 1 DROP: 14 SOLUTION/ DROPS OPHTHALMIC at 19:47

## 2021-02-10 RX ADMIN — CHLORHEXIDINE GLUCONATE 0.12% ORAL RINSE 15 ML: 1.2 LIQUID ORAL at 08:29

## 2021-02-10 RX ADMIN — PANTOPRAZOLE SODIUM 40 MG: 40 TABLET, DELAYED RELEASE ORAL at 06:22

## 2021-02-10 RX ADMIN — RIFAXIMIN 550 MG: 550 TABLET ORAL at 19:47

## 2021-02-10 RX ADMIN — IPRATROPIUM BROMIDE AND ALBUTEROL SULFATE 1 AMPULE: 2.5; .5 SOLUTION RESPIRATORY (INHALATION) at 15:58

## 2021-02-10 RX ADMIN — ASPIRIN 81 MG: 81 TABLET, CHEWABLE ORAL at 08:22

## 2021-02-10 RX ADMIN — POLYVINYL ALCOHOL 1 DROP: 14 SOLUTION/ DROPS OPHTHALMIC at 16:11

## 2021-02-10 RX ADMIN — GLYCOPYRROLATE 1 MG: 1 TABLET ORAL at 08:23

## 2021-02-10 RX ADMIN — MINERAL OIL AND WHITE PETROLATUM: 150; 830 OINTMENT OPHTHALMIC at 19:47

## 2021-02-10 RX ADMIN — IPRATROPIUM BROMIDE AND ALBUTEROL SULFATE 1 AMPULE: 2.5; .5 SOLUTION RESPIRATORY (INHALATION) at 20:00

## 2021-02-10 ASSESSMENT — PAIN SCALES - GENERAL
PAINLEVEL_OUTOF10: 10
PAINLEVEL_OUTOF10: 0

## 2021-02-10 ASSESSMENT — PAIN DESCRIPTION - FREQUENCY: FREQUENCY: CONTINUOUS

## 2021-02-10 ASSESSMENT — PAIN DESCRIPTION - PAIN TYPE: TYPE: CHRONIC PAIN

## 2021-02-10 ASSESSMENT — PAIN DESCRIPTION - LOCATION: LOCATION: SHOULDER

## 2021-02-10 ASSESSMENT — PAIN DESCRIPTION - ORIENTATION: ORIENTATION: RIGHT

## 2021-02-10 ASSESSMENT — PAIN DESCRIPTION - ONSET: ONSET: ON-GOING

## 2021-02-10 ASSESSMENT — PAIN DESCRIPTION - DESCRIPTORS: DESCRIPTORS: ACHING;DISCOMFORT

## 2021-02-10 ASSESSMENT — PAIN DESCRIPTION - PROGRESSION: CLINICAL_PROGRESSION: NOT CHANGED

## 2021-02-10 NOTE — PROGRESS NOTES
Physical Therapy  Treatment Note  Evaluating Therapist: Shirley Woo DPT    NAME: Shen Bryan  ROOM: 0653M  DIAGNOSIS: Acute metabolic encephalopathy  PRECAUTIONS: Falls, PEG tube, trach (capped), L hemiparesis, LE proprioceptive deficits  HPI: 76year old male admitted to [facility] on 11/18/20 with respiratory failure secondary to witnessed aspiration event at home - chronic dysphagia with feeding tube. Intubated twice at [facility] with subsequent trach placement. Admitted to [facility] for vent weaning. Transferred to ARU 2/1. Social:  Pt lives with his son in a trailer with 8 steps with B rails or a ramp to enter. Prior to admission pt ambulated with [employer] Mod I. Initial Evaluation  2/3/21 AM     PM    Short Term Goals Long Term Goals    Was pt agreeable to Eval/treatment? yes yes yes     Does pt have pain? L wrist and R shoulder pain L wrist and B shoulder pain L wrist and B shoulder pain     Bed Mobility  Rolling: SBA  Supine to sit: SBA  Sit to supine: SBA  Scooting: SBA SBA all aspects NT Supervision Independent   Transfers Sit to stand: Max A x2  Stand to sit: Max A x2  Stand pivot: NT  Slideboard: Max A Sit to stand: Max A  Stand to sit: Max A  Stand pivot: NT  Slideboard: Mod A Sit to stand: Max A  Stand to sit:  Max A  Stand pivot: NT  Slideboard: Min A Min A SBA   Ambulation   NT See TE below 9 feet in parallel bars with Mod/Max A + WC follow 25 feet with [employer] with Min A >75 feet with [employer] with SBA   Walking 10 feet on uneven surface NT NT NT 10 feet with [employer] with Min A 10 feet with [employer] with SBA   Wheel Chair Mobility 100 feet  feet Supervision 150 feet Supervision >200 feet Supervision >200 feet Independent   Car Transfers NT NT NT Min A SBA   Stair negotiation: ascended and descended NT NT NT 4 steps with B rail with Min A 4 steps with B rail with SBA   Curb Step:   ascended and descended NT NT NT     Picking up object off the floor NT NT NT     BLE ROM [facility] WFL      BLE Strength RLE 4/5  LLE 3+/5 RLE 4/5  LLE 3+/5      Balance  Sitting: SBA   Static stand: Max A x2 Sitting: SBA   Static stand: Max A       Date Family Teach Completed TBA TBA      Is additional Family Teaching Needed? Y or N Y Y      Hindering Progress Debility, L hemiparesis Debility, L hemiparesis      PT recommended ELOS 4-5 weeks       Team's Discharge Plan        Therapist at Team Meeting          Therapeutic Exercise:   AM:   - Slideboard transfer wheelchair<>mat table  - Ambulation in parallel bars with Mod/Max A (PT sitting in front of pt using harness and providing intermittent knee block) + WC follow - 7 feet x1 rep and 3 feet x1 rep (knees buckled during second rep)  - Sit<>Stand x1 rep from chair in parallel bars with Max A - held stand for as long as possible (~60 sec)  PM:  - Sit<>Stand x2 reps from chair in parallel bars with Max A - held each stand for as long as possible (~60 sec each rep)  - Reverse wheelchair propulsion with legs x200 feet to improve BLE strength  - Slideboard transfer wheelchair<>mat table    Additional Comments: Pt continues to have significant functional deficits as a result of the pt's debility and impaired strength levels. Pt has made some improvement on the slideboard transfer but still does need assistance with board placement/removal and laterally scooting. Attempting to progress pt in standing/walking however he has not made much progress in either. Pt during ambulation today in parallel bars had a knee buckle and was not able to perform any corrective action to compensate and catch himself thus resulting in dependent assist from PT to catch pt. Due to the lack of progress the pt has made with transfers and ambulation thus far it is likely that the pt will be at a wheelchair level and require assistance upon discharge from rehab unit. Will continue to address transfers and ambulation however an added emphasis on wheelchair level function will be incorporated.     Patient/family education  Pt/family educated on standing posture, ambulation mechanics in parallel bars, slideboard transfer technique/safety    Patient/family response to education:   Pt/family verbalized understanding Pt/family demonstrated skill Pt/family requires further education in this area   yes With cues/assist yes     AM  Time in: 0915  Time out: 1000  PM   Time in: 1515  Time out: 1600    Pt is making fair progress toward established Physical Therapy goals. Continue with physical therapy current plan of care.     Priyank Salamanca DPT [id number]

## 2021-02-10 NOTE — PROGRESS NOTES
OCCUPATIONAL THERAPY DAILY NOTE    Date:2/10/2021  Patient Name: Lalo Pereira  MRN: [id number]  : 1945  Room: [address] [place], [place] [address]     Diagnosis: RF secondary to aspiration @ home, trach & peg  Additional Pertinent Hx: CHF, COPD, hx CVA, dysphagia, peg heart disease  Precautions: Falls, NPO, Peg    Functional Assessment:   Date Status AE  Comments   Feeding 21 NPO     Grooming 21 CGA seated          Oral Care 2/3/21 Min A     Bathing 21 Mod A Seated    UB Dressing 21 Min A     LB Dressing 21 Max/Mod A reacher    Footwear 21 Mod A     Toileting 2/3/21 Max A     Homemaking 2/10/21 SBA Laundry Laundry folding task completed seated at table. Pt required increased time to complete activity. Pt utilized B UE's to complete with increased difficulty folding large items d/t R shoulder ROM limits. Functional Transfers / Balance:   Date Status DME  Comments   Sit Balance 21 Min/CGA     Stand Balance 21 Mod A x2     [] Tub  [] Shower   Transfer  TBA-  Pt sponge bathed at EOB PTA. Commode   Transfer 21 Max A Bariatric 3-in-1, transfer board    Functional   Mobility 2/10/21 Min A W/c Pt completed functional item retrieval activity w/c level maneuvering throughout OT gym with min cues for awareness d/t running into objects and assist to maneuver in smaller spaces. Min cues for safety provided. Pt retrieved items from floor level and tabletop level utilizing reacher as needed for safety. Other:  Bed>W/c      Supine to EOB   21   Max A       Min/CGA   Transfer board    Bed rail      Functional Exercises / Activity:  -5# Sanderbox exercises completed seated at table with focus on increasing B UE strength and overall endurance for functional activities. -3# resistive hand gripper completed x40 with B hands with focus on increasing B hand/ strength for functional activities. Fair tolerance.    -2# weighted ball exercises completed seated at table 3x10 reps in all planes

## 2021-02-10 NOTE — PROGRESS NOTES
Progress Note  Date:2/10/2021       Room:[facility]5505-  Patient Name:Isael Rachel     Date of Birth:     Age:76 y.o. Subjective    Subjective:  Symptoms:  Stable. Diet:  NPO. Activity level: Impaired due to weakness. Pain:  He reports no pain. Review of Systems  Objective         Vitals Last 24 Hours:  TEMPERATURE:  No data recorded  RESPIRATIONS RANGE: No data recorded  PULSE OXIMETRY RANGE: SpO2  Av %  Min: 92 %  Max: 92 %  PULSE RANGE: Pulse  Av  Min: 80  Max: 80  BLOOD PRESSURE RANGE: Systolic (65QMC), KZN:220 , Min:110 , MAGDALENE:480   ; Diastolic (46KHA), CKZ:75, Min:60, Max:60    I/O (24Hr): Intake/Output Summary (Last 24 hours) at 2/10/2021 0744  Last data filed at 2/10/2021 0323  Gross per 24 hour   Intake 398 ml   Output 450 ml   Net -52 ml     Objective:  General Appearance: In no acute distress. Vital signs: (most recent): Blood pressure 110/60, pulse 80, temperature 98.7 °F (37.1 °C), temperature source Temporal, resp. rate 18, height 6' 3\" (1.905 m), weight 212 lb 8 oz (96.4 kg), SpO2 92 %. Vital signs are normal.    Output: Producing urine and producing stool. Lungs:  Normal effort and normal respiratory rate. Breath sounds clear to auscultation. Heart: Normal rate. Regular rhythm. S1 normal and S2 normal.    Abdomen: Abdomen is soft. Bowel sounds are normal.   There is no abdominal tenderness. Extremities: Normal range of motion. Neurological: Patient is alert. (4/5 strength). Labs/Imaging/Diagnostics    Labs:  CBC:No results for input(s): WBC, RBC, HGB, HCT, MCV, RDW, PLT in the last 72 hours. CHEMISTRIES:  Recent Labs     21  1010      K 4.8      CO2 25   BUN 25*   CREATININE 0.7   GLUCOSE 79     PT/INR:No results for input(s): PROTIME, INR in the last 72 hours. APTT:No results for input(s): APTT in the last 72 hours.   LIVER PROFILE:No results for input(s): AST, ALT, BILIDIR, BILITOT, ALKPHOS in the last 72 hours.    Imaging Last 24 Hours:  No results found. Assessment//Plan           Hospital Problems           Last Modified POA    Acute metabolic encephalopathy 8/4/9445 Yes    Severe protein-calorie malnutrition (HCC) (Chronic) 2/4/2021 Yes      Assessment:      Date   Status   AE    Comments     Feeding   2/9/21   NPO             Grooming   2/7/21   CGA   seated               Oral Care   2/3/21   Min A             Bathing   2/8/21   Mod A Seated         UB Dressing   2/8/21   Min A             LB Dressing   2/9/21   Max/Mod A   reacher   Pt don/doff pants seated in w/c to knee level with reacher with assist required to pull on pants to thread LE's into pants. Mod cues for technique to utilize AE provided with fair follow through. Footwear   2/9/21   Mod A        To don/doff socks seated in w/c utilizing modified leg cross over technique. Assist required to initiate over L toes and to cross over L LE. Pt reports no shoes present at this time to assess. Toileting   2/3/21   Max A             Homemaking       TBA                  Functional Transfers / Balance:      Date Status DME  Comments   Sit Balance 2/9/21   Min/CGA   Unsupported sitting in w/c during dynamic tasks and seated at EOB. Stand Balance 2/7/21   Mod A x2       []? Tub  []? Shower   Transfer     TBA-  Pt sponge bathed at EOB PTA.       Commode   Transfer 2/8/21   Max A Bariatric 3-in-1, transfer board     Functional   Mobility 2/9/21   Min A w/c Maneuvering w/c in OT clinic. Pt required assist to safely maneuver around environmental obstacles in more crowded area. Other:  Bed>W/c        Supine to EOB    2/9/21 2/8/21    Max A         Min/CGA    Transfer board     Bed rail    Min cues for sequencing/safety required.         Assessment:    Condition: In stable condition. Improving. (Metabolic encephalopathy). Plan:   Encourage ambulation.   (Tolerating therapy overall  The trach is going to stay in place at this point and may be long-term  We are gradually going to switch the tube feeding over to bolus  I am checking with Dr. Ash Travis about the rifaximin).        Electronically signed by Delmy Gloria MD on 2/10/21 at 7:44 AM EST

## 2021-02-10 NOTE — PLAN OF CARE
Problem: Skin Integrity:  Goal: Will show no infection signs and symptoms  Description: Will show no infection signs and symptoms  Outcome: Ongoing  Goal: Absence of new skin breakdown  Description: Absence of new skin breakdown  Outcome: Ongoing     Problem: Falls - Risk of:  Goal: Will remain free from falls  Description: Will remain free from falls  Outcome: Ongoing  Goal: Absence of physical injury  Description: Absence of physical injury  Outcome: Ongoing     Problem: Respiratory:  Goal: Ability to maintain a clear airway will improve  Description: Ability to maintain a clear airway will improve  Outcome: Ongoing  Goal: Will remain free from infection  Description: Will remain free from infection  Outcome: Ongoing  Goal: Absence of aspiration  Description: Absence of aspiration  Outcome: Ongoing     Problem: Safety:  Goal: Ability to chew and swallow food without choking will improve  Description: Ability to chew and swallow food without choking will improve  Outcome: Ongoing  Goal: Ability to demonstrate good, daily oral hygiene techniques will improve  Description: Ability to demonstrate good, daily oral hygiene techniques will improve  Outcome: Ongoing  Goal: Maintenance of upright position during and after feeding  Description: Maintenance of upright position during and after feeding  Outcome: Ongoing     Problem: Pain:  Goal: Pain level will decrease  Description: Pain level will decrease  Outcome: Ongoing  Goal: Control of acute pain  Description: Control of acute pain  Outcome: Ongoing  Goal: Control of chronic pain  Description: Control of chronic pain  Outcome: Ongoing

## 2021-02-10 NOTE — PROGRESS NOTES
Speech Language Pathology  ACUTE REHABILITATION--DAILY PROGRESS NOTE          ADMITTING DIAGNOSIS: Acute metabolic encephalopathy [J38.14]     SPEECH PATHOLOGY DIAGNOSIS:    Mild-moderate cognitive deficit. 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 Botox received in 2008 with short term success). Decreased intelligibility due to weakness, missing dentition, fast rate and resonance issues.     THERAPY RECOMMENDATIONS:   Speech Pathology intervention is recommended 3-6 times per week for LOS or when goals are met with emphasis on the following:      Improve STM recall, sequencing and problem solving for increased functional independence with completion of ADLs/IADLs to a FIM level commensurate with modified independent.                                                                                                                                         FIMS SCORES                            Swallowing                          Current Status             1--Total Assistance                   Short Term Goal         4--Minimal Assistance               Long Term Goal          5--Supervision      Receptive                          Current Status             5--Supervision               Short Term Goal         6--Modified Independent                       Long Term Goal          6--Modified Independent              Expressive                          Current Status             5--Supervision               Short Term Goal         6--Modified Independent                       Long Term Goal          6--Modified Independent              Social Interaction                          Current Status             6--Modified Independent                       Short Term Goal         6--Modified Independent                       Long Term Goal          6--Modified Independent                                                             Problem Solving Current Status            4--Minimal Assistance               Short Term Goal         5--Supervision               Long Term Goal          6--Modified Independent                          Memory                          Current Status            4--Minimal Assistance  / 3--Moderate Assistance                     Short Term Goal         4--Minimal Assistance   Long Term Goal          5--Supervision                                              SWALLOWING:      Diet:  NPO (nothing by mouth including oral meds). PEG tube in place. Chronic dysphagia reported but inconsistent information provided by patient. Lengthy case history taken by other SLP from patient and from pulmonary NP- see MBS report. Note from 2/4: Speech Pathologist (SLP) completed education with the patient/family regarding procedure of Modified Barium Swallow Study prior to exam and then type of swallowing impairment following completion of MBSS. Reviewed current solid/liquid consistency diet recommendations (NPO (nothing by mouth including oral meds). Discussed strategies trialed (as previously used by Pt) with poor outcome and continued aspiration. Images from MBSS reviewed with patient/ family and education provided. Reviewed aspiration precautions. Encouraged patient and/or family to engage SLP in unstructured Q&A session relative to identified deficit areas; indicated understanding of all information provided via satisfactory verbal response. Patient completed tongue-base retraction exercises and laryngeal elevation exercises with fair outcome. Patient did each exercise in 2 sets of 5 with a break for rest in between rather than 10 repetitions due to fatigue. Patient varied pitch with fair- outcome. Patient was encouraged to complete exercises 2-3 more times today.         LANGUAGE:      Patient actively in activity in which he was asked to describe target words, and guess the target words based on descriptions stated by others in the game. Patient occasionally spoke on a tangent and interrupted game for several minutes. COGNITION:        Actively participated in a language-based cognitive activity that asked participants to describe a wide variety of famous people, things, and places for peers to guess. Demonstrated fair+/good STM recall of stimulus items and was consistently able to provide response to probe items. Patient had fair+ recall of rules of activity and kept track of turns for players. Patient benefited from cues to problem solve and give more details when other players did not have enough information to make a guess to the target word. Safety:  Fair-  Other therapies (PT, OT) report some concern with safety. SPEECH:     Difficult to understand at times due to facial weakness and absence of teeth. SP recommended after discharge: yes  Supervision recommended at discharge: 24 hour    Will continue SP intervention as per previously established POC.     Minute Tracking:    Individual therapy:     0 minutes  Concurrent therapy:             60 minutes  Group therapy:     0 minutes  Co-treatment therapy:    0 minutes    Total minutes for 2/10/2021: 60 minutes    Junaid Fearing   SLP student intern

## 2021-02-11 PROCEDURE — 97129 THER IVNTJ 1ST 15 MIN: CPT

## 2021-02-11 PROCEDURE — 6370000000 HC RX 637 (ALT 250 FOR IP): Performed by: PHYSICAL MEDICINE & REHABILITATION

## 2021-02-11 PROCEDURE — 97130 THER IVNTJ EA ADDL 15 MIN: CPT

## 2021-02-11 PROCEDURE — 97530 THERAPEUTIC ACTIVITIES: CPT

## 2021-02-11 PROCEDURE — 1280000000 HC REHAB R&B

## 2021-02-11 PROCEDURE — 97110 THERAPEUTIC EXERCISES: CPT

## 2021-02-11 PROCEDURE — 6370000000 HC RX 637 (ALT 250 FOR IP): Performed by: CLINICAL NURSE SPECIALIST

## 2021-02-11 PROCEDURE — 92526 ORAL FUNCTION THERAPY: CPT

## 2021-02-11 PROCEDURE — 94640 AIRWAY INHALATION TREATMENT: CPT

## 2021-02-11 RX ORDER — MINERAL OIL/HYDROPHIL PETROLAT
OINTMENT (GRAM) TOPICAL 2 TIMES DAILY
Status: DISCONTINUED | OUTPATIENT
Start: 2021-02-11 | End: 2021-02-26 | Stop reason: HOSPADM

## 2021-02-11 RX ORDER — MINERAL OIL/HYDROPHIL PETROLAT
OINTMENT (GRAM) TOPICAL 4 TIMES DAILY PRN
Status: DISCONTINUED | OUTPATIENT
Start: 2021-02-11 | End: 2021-02-26 | Stop reason: HOSPADM

## 2021-02-11 RX ADMIN — POLYVINYL ALCOHOL 1 DROP: 14 SOLUTION/ DROPS OPHTHALMIC at 20:55

## 2021-02-11 RX ADMIN — METOPROLOL TARTRATE 25 MG: 25 TABLET, FILM COATED ORAL at 20:57

## 2021-02-11 RX ADMIN — APIXABAN 5 MG: 5 TABLET, FILM COATED ORAL at 20:57

## 2021-02-11 RX ADMIN — POLYETHYLENE GLYCOL 3350 17 G: 17 POWDER, FOR SOLUTION ORAL at 08:46

## 2021-02-11 RX ADMIN — DOCUSATE SODIUM 283 MG: 283 LIQUID RECTAL at 20:58

## 2021-02-11 RX ADMIN — IPRATROPIUM BROMIDE AND ALBUTEROL SULFATE 1 AMPULE: 2.5; .5 SOLUTION RESPIRATORY (INHALATION) at 16:05

## 2021-02-11 RX ADMIN — IPRATROPIUM BROMIDE AND ALBUTEROL SULFATE 1 AMPULE: 2.5; .5 SOLUTION RESPIRATORY (INHALATION) at 11:49

## 2021-02-11 RX ADMIN — Medication: at 20:57

## 2021-02-11 RX ADMIN — RIFAXIMIN 550 MG: 550 TABLET ORAL at 08:47

## 2021-02-11 RX ADMIN — MINERAL OIL AND WHITE PETROLATUM: 150; 830 OINTMENT OPHTHALMIC at 20:54

## 2021-02-11 RX ADMIN — POLYVINYL ALCOHOL 1 DROP: 14 SOLUTION/ DROPS OPHTHALMIC at 13:41

## 2021-02-11 RX ADMIN — GLYCOPYRROLATE 1 MG: 1 TABLET ORAL at 09:00

## 2021-02-11 RX ADMIN — IPRATROPIUM BROMIDE AND ALBUTEROL SULFATE 1 AMPULE: 2.5; .5 SOLUTION RESPIRATORY (INHALATION) at 09:05

## 2021-02-11 RX ADMIN — IPRATROPIUM BROMIDE AND ALBUTEROL SULFATE 1 AMPULE: 2.5; .5 SOLUTION RESPIRATORY (INHALATION) at 20:02

## 2021-02-11 RX ADMIN — ATORVASTATIN CALCIUM 20 MG: 20 TABLET, FILM COATED ORAL at 20:57

## 2021-02-11 RX ADMIN — RIFAXIMIN 550 MG: 550 TABLET ORAL at 20:57

## 2021-02-11 RX ADMIN — METOPROLOL TARTRATE 25 MG: 25 TABLET, FILM COATED ORAL at 08:47

## 2021-02-11 RX ADMIN — ASPIRIN 81 MG: 81 TABLET, CHEWABLE ORAL at 08:46

## 2021-02-11 RX ADMIN — APIXABAN 5 MG: 5 TABLET, FILM COATED ORAL at 08:47

## 2021-02-11 RX ADMIN — POLYVINYL ALCOHOL 1 DROP: 14 SOLUTION/ DROPS OPHTHALMIC at 17:00

## 2021-02-11 RX ADMIN — POLYVINYL ALCOHOL 1 DROP: 14 SOLUTION/ DROPS OPHTHALMIC at 08:49

## 2021-02-11 NOTE — PROGRESS NOTES
Progress Note  Date:2021       Room:5505/5505-  Patient Name:Isael Rachel     Date of Birth:     Age:76 y.o. Subjective    Subjective:  Symptoms:  Stable. He reports weakness. Diet:  NPO. Activity level: Impaired due to weakness. Pain:  He reports no pain. Review of Systems   Neurological: Positive for weakness. Objective         Vitals Last 24 Hours:  TEMPERATURE:  Temp  Av.4 °F (36.3 °C)  Min: 97.4 °F (36.3 °C)  Max: 97.4 °F (36.3 °C)  RESPIRATIONS RANGE: Resp  Av  Min: 18  Max: 18  PULSE OXIMETRY RANGE: SpO2  Av %  Min: 97 %  Max: 99 %  PULSE RANGE: Pulse  Av  Min: 90  Max: 109  BLOOD PRESSURE RANGE: Systolic (40LSX), BBT:184 , Min:106 , PZU:558   ; Diastolic (37SGD), KCX:30, Min:65, Max:74    I/O (24Hr): Intake/Output Summary (Last 24 hours) at 2021 0858  Last data filed at 2021 0619  Gross per 24 hour   Intake 1110 ml   Output 900 ml   Net 210 ml     Objective:  General Appearance: In no acute distress. Vital signs: (most recent): Blood pressure 107/69, pulse 101, temperature 97.4 °F (36.3 °C), temperature source Oral, resp. rate 18, height 6' 3\" (1.905 m), weight 212 lb 8 oz (96.4 kg), SpO2 99 %. Vital signs are normal.    Output: Producing urine and producing stool. Lungs:  Normal effort and normal respiratory rate. Breath sounds clear to auscultation. Heart: Normal rate. Irregular rhythm. S1 normal and S2 normal.  Positive for murmur. Abdomen: Abdomen is soft. Bowel sounds are normal.   There is no abdominal tenderness. Extremities: Normal range of motion. Neurological: Patient is alert. (4/5 strength). Labs/Imaging/Diagnostics    Labs:  CBC:No results for input(s): WBC, RBC, HGB, HCT, MCV, RDW, PLT in the last 72 hours.   CHEMISTRIES:  Recent Labs     21  1010      K 4.8      CO2 25   BUN 25*   CREATININE 0.7   GLUCOSE 79     PT/INR:No results for input(s): PROTIME, INR in the last 72 hours. APTT:No results for input(s): APTT in the last 72 hours. LIVER PROFILE:No results for input(s): AST, ALT, BILIDIR, BILITOT, ALKPHOS in the last 72 hours. Imaging Last 24 Hours:  No results found. Assessment//Plan           Hospital Problems           Last Modified POA    Acute metabolic encephalopathy 9/9/8914 Yes    Severe protein-calorie malnutrition (HCC) (Chronic) 2/4/2021 Yes        Assessment:    Condition: In stable condition. Improving. (Metabolic encephalopathy). Plan:   Encourage ambulation. (Improving in therapy  Strength and endurance are improving  He is on the rifaximin for hepatic dysfunction  Liver functions are back to normal  Tolerating plugging well  Still n.p.o. and aspirating  I will switch him to bolus tube feedings  We can remove the midline).        Electronically signed by Viviana Huerta MD on 2/11/21 at 8:58 AM EST

## 2021-02-11 NOTE — PROGRESS NOTES
Physical Therapy  Treatment Note  Evaluating Therapist: Wen Magana DPT    NAME: Julisa Cooper  ROOM: [id number]  DIAGNOSIS: Acute metabolic encephalopathy  PRECAUTIONS: Falls, PEG tube, trach (capped), L hemiparesis, LE proprioceptive deficits  HPI: 76year old male admitted to [facility] on 11/18/20 with respiratory failure secondary to witnessed aspiration event at home - chronic dysphagia with feeding tube. Intubated twice at [facility] with subsequent trach placement. Admitted to [facility] for vent weaning. Transferred to ARU 2/1. Social:  Pt lives with his son in a trailer with 8 steps with B rails or a ramp to enter. Prior to admission pt ambulated with [employer] Mod I. Initial Evaluation  2/3/21 AM     PM    Short Term Goals Long Term Goals    Was pt agreeable to Eval/treatment? yes yes yes     Does pt have pain? L wrist and R shoulder pain L wrist and B shoulder pain L wrist and B shoulder pain     Bed Mobility  Rolling: SBA  Supine to sit: SBA  Sit to supine: SBA  Scooting: SBA SBA all aspects NT Supervision Independent   Transfers Sit to stand: Max A x2  Stand to sit: Max A x2  Stand pivot: NT  Slideboard: Max A Sit to stand: Max A  Stand to sit: Max A  Stand pivot: NT  Slideboard: Min A Sit to stand: Max A  Stand to sit:  Max A  Stand pivot: NT  Slideboard: SBA/Min A (assist for slideboard placement and removal) Min A SBA   Ambulation   NT 9 feet x1 rep and 10 feet x1 rep in parallel bars with Mod/Max A + WC follow 9 feet x1 rep and 7 feet x1 rep in parallel bars with Mod/Max A + WC follow 25 feet with [employer] with Min A >75 feet with [employer] with SBA   Walking 10 feet on uneven surface NT NT NT 10 feet with [employer] with Min A 10 feet with [employer] with SBA   Wheel Chair Mobility 100 feet  feet Supervision 200 feet Supervision >200 feet Supervision >200 feet Independent   Car Transfers NT NT NT Min A SBA   Stair negotiation: ascended and descended NT NT NT 4 steps with B rail with Min A 4 steps with B rail with SBA   Curb Step: ascended and descended NT NT NT     Picking up object off the floor NT NT NT     BLE ROM WFL WFL      BLE Strength RLE 4/5  LLE 3+/5 RLE 4/5  LLE 3+/5      Balance  Sitting: SBA   Static stand: Max A x2 Sitting: SBA   Static stand: Max A       Date Family Teach Completed TBA TBA      Is additional Family Teaching Needed? Y or N Y Y      Hindering Progress Debility, L hemiparesis Debility, L hemiparesis      PT recommended ELOS 4-5 weeks       Team's Discharge Plan        Therapist at Team Meeting          Therapeutic Exercise:   AM:   - Slideboard transfer wheelchair<>mat table  PM:  - Slideboard transfer wheelchair<>mat table    Additional Comments: Pt continues to have significant functional deficits as a result of the pt's debility and impaired strength levels. Pt has made progress with slideboard transfers as he is able to now perform the lateral scooting portion on his own without assistance from PT. Pt still performs the slideboard transfer at a slow speed and requires increased time to complete this task. Pt still requires heavy assistance to stand and walk due to impaired BLE strength and generalized debility. Pt ambulates with flexed knees and requires assistance with weight shifting his hips in order to advance the LEs. Will continue to work with pt at a wheelchair function level as this is a more realistic and practical goal at this time than the pt stand pivoting and walking. Family training with son scheduled for tomorrow. Plan to address mobility deficits and assistance required to assist pt with son.     Patient/family education  Pt/family educated on standing posture, ambulation mechanics in parallel bars, slideboard transfer technique/safety    Patient/family response to education:   Pt/family verbalized understanding Pt/family demonstrated skill Pt/family requires further education in this area   yes With cues/assist yes     AM  Time in: 0915  Time out: 1000  PM   Time in: 1515  Time out: 1600    Pt is making fair progress toward established Physical Therapy goals. Continue with physical therapy current plan of care.     Osmar Moyer DPT [id number]

## 2021-02-11 NOTE — PROGRESS NOTES
OCCUPATIONAL THERAPY DAILY NOTE    Date:2021  Patient Name: Tom Cavazos  MRN: [id number]  : 1945  Room: [address] [place], [place] [address]     Diagnosis: RF secondary to aspiration @ home, trach & peg  Additional Pertinent Hx: CHF, COPD, hx CVA, dysphagia, peg heart disease  Precautions: Falls, NPO, Peg    Functional Assessment:   Date Status AE  Comments   Feeding 21 NPO     Grooming 21 CGA seated          Oral Care 2/3/21 Min A     Bathing 21 Mod A Seated    UB Dressing 21 Min A     LB Dressing 21 Max/Mod A reacher    Footwear 21 Mod A     Toileting 2/3/21 Max A     Homemaking 2/10/21 SBA Laundry      Functional Transfers / Balance:   Date Status DME  Comments   Sit Balance 21 Min/CGA     Stand Balance 21 Mod A x2     [] Tub  [] Shower   Transfer  TBA-  Pt sponge bathed at EOB PTA. Commode   Transfer 21 Max x1-2 to Mod A Bariatric 3-in-1, transfer board Pt required Max A x1-2 for transfer from w/c> commode this date. Increased assist required to initiate transfer. Mod cues for sequencing/safety required to complete. Pt completed transfer back to chair with Mod A to complete and Min cues for sequencing/safety. Transfer to/from w/c both completed on a level plane. Functional   Mobility 21 Min A  W/c Completed within OT clinic. Pt requires Min A to maneuver safely around various environmental obstacles d/t pt running into table repeatedly with assist required to recognize and correct. Other:  Bed>W/c      Supine to EOB   21   Max A       Min/CGA   Transfer board    Bed rail      Functional Exercises / Activity:  -1# Mr Mickey Hicks completed x10 reps to increase B UE strength/endurance for functional activities. Fair tolerance. -2# hand weight in R UE and 1# hand weight in L UE 3x10 reps in all planes to tolerance with focus on increasing B UE strength/endurance for functional activities.    -Ricardo Tong completed x40 with focus on increasing B UE strength and overall endurance for functional activities. Fair tolerance.  -Card matching therapeutic activity completed seated at table with focus on unsupported sitting in w/c, functional reaching and increasing B UE AROM. Fair+ tolerance. -5# Sanderbox completed x40 in all planes to increase B UE strength/endurance for functional activities. Fair tolerance. Sensory / Neuromuscular Re-Education:      Cognitive Skills:   Status Comments   Problem   Solving fair     Memory fair  \"   Sequencing fair     Safety fair -      Visual Perception:    Education:  Pt educated on transfer board safety and techniques. Pt verbalized/demonstrated fair understanding, recommend continued eduction. [] Family teach completed on:    Pain Level: Pt c/o pain in L shoulder this date. Did not rate. Additional Notes:       Patient has made fair  progress during treatment sessions toward set goals.  Therapy emphasis to obtain goals:  Current Treatment Recommendations: Strengthening, Gait Training, Patient/Caregiver Education & Training, Home Management Training, ROM, Equipment Evaluation, Education, & procurement, Balance Training, Functional Mobility Training, Endurance Training, Wheelchair Mobility Training, Safety Education & Training, Self-Care / ADL      Long term goal 2: Pt demo Mod I grooiming seated  Long term goal 3: Pt demo Min A bathing @ sink level or shower level seated  Long term goal 4: Pt demo s/u UE & Min A LE dress to don depends, pants, socks & shoes using AE as needed  Long term goal 5: Pt demo Min-Mod  A commode trf wtih appropriate DME to ensure pt safety  Long term goals 6: Pt demo Min-Mod A tub trf with appropriate DME to ensure pt safety  Long term goal 7: Pt demo Sup light homemaking activity @ wc level  Long term goal 8: Pt demo G- endurance for a 20 minute funcitonal activity  Long term goal 9: Pt demo increase BUE strength to 4+/5 to improve pt ability to complete ADLs  Long term goal 10: Pt demo Mod I wc propulsion throughout a living environment & around obstacles    [x] Continue with current OT Plan of care.   [] Prepare for Discharge     DISCHARGE RECOMMENDATIONS  Recommended DME: TBA    Post Discharge Care:   []Home Independently  [x]Home with 24hr Care / Supervision []Home with Partial Supervision []Home with Home Health OT []Home with Out Pt OT []Other: ___   Comments:         Time in Time out Tx Time Breakdown  Variance:   First Session  [phone] [] Individual Tx-  [x] Concurrent Tx-45 Mins   [] Co-Tx -   [] Group Tx -   [] Time Missed -     Second Session [phone] [x] Individual Tx- 45 Mins  [] Concurrent Tx -  [] Co-Tx -   [] Group Tx -   [] Time Missed -     Third Session    [] Individual Tx-   [] Concurrent Tx -  [] Co-Tx -   [] Group Tx -   [] Time Missed -         Total Tx Time: 90 Mins      Andrew ABDULLAHI/ROJELIO [id number]

## 2021-02-11 NOTE — FLOWSHEET NOTE
Inpatient Wound Care(initial evaluation)  5505b    Admit Date: 2/2/2021 12:42 PM    Reason for consult:  Right buttocks  Skin rounds for prevalence study    Findings:     02/11/21 0600   Wound 02/11/21 Buttocks Right   Date First Assessed/Time First Assessed: 02/11/21 0600   Present on Hospital Admission: No  Location: Buttocks  Wound Location Orientation: Right   Wound Image    Wound Etiology Pressure Stage  2   Dressing/Treatment Pharmaceutical agent (see MAR)   Wound Length (cm) 1.6 cm   Wound Width (cm) 0.7 cm   Wound Depth (cm) 0.1 cm   Wound Surface Area (cm^2) 1.12 cm^2   Wound Volume (cm^3) 0.11 cm^3   Wound Assessment Pink/red   Drainage Amount None   Margie-wound Assessment Dry/flaky     **Informed Consent**    The patient has given verbal consent to have photos taken of wound and inserted into their chart as part of their permanent medical record for purposes of documentation, treatment management and/or medical review. All Images taken on 2/11/21 of patient name: Brandon Ernandez were transmitted and stored on secured [employer] located within [facility] Tab by a registered [employer] Mobile Application Device.      Impression:  Stage 2    Plan:  Aquaphor to buttocks, dry skin  Will need continued preventative care    Anita Pale 2/11/2021 6:10 AM

## 2021-02-11 NOTE — PROGRESS NOTES
Speech Language Pathology  ACUTE REHABILITATION--DAILY PROGRESS NOTE          ADMITTING DIAGNOSIS: Acute metabolic encephalopathy [B43.46]     SPEECH PATHOLOGY DIAGNOSIS:    Mild-moderate cognitive deficit. 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 Botox received in 2008 with short term success). Decreased intelligibility due to weakness, missing dentition, fast rate and resonance issues.     THERAPY RECOMMENDATIONS:   Speech Pathology intervention is recommended 3-6 times per week for LOS or when goals are met with emphasis on the following:      Improve STM recall, sequencing and problem solving for increased functional independence with completion of ADLs/IADLs to a FIM level commensurate with modified independent.                                                                                                                                         FIMS SCORES                            Swallowing                          Current Status             1--Total Assistance                   Short Term Goal         4--Minimal Assistance               Long Term Goal          5--Supervision      Receptive                          Current Status             5--Supervision               Short Term Goal         6--Modified Independent                       Long Term Goal          6--Modified Independent              Expressive                          Current Status             5--Supervision               Short Term Goal         6--Modified Independent                       Long Term Goal          6--Modified Independent              Social Interaction                          Current Status             6--Modified Independent                       Short Term Goal         6--Modified Independent                       Long Term Goal          6--Modified Independent                                                             Problem Solving Current Status            4--Minimal Assistance               Short Term Goal         5--Supervision               Long Term Goal          6--Modified Independent                          Memory                          Current Status            4--Minimal Assistance  / 3--Moderate Assistance                     Short Term Goal         4--Minimal Assistance   Long Term Goal          5--Supervision                                              SWALLOWING:      Diet:  NPO (nothing by mouth including oral meds). PEG tube in place. Chronic dysphagia reported but inconsistent information provided by patient. Lengthy case history taken by other SLP from patient and from pulmonary NP- see MBS report. Note from 2/4: Speech Pathologist (SLP) completed education with the patient/family regarding procedure of Modified Barium Swallow Study prior to exam and then type of swallowing impairment following completion of MBSS. Reviewed current solid/liquid consistency diet recommendations (NPO (nothing by mouth including oral meds). Discussed strategies trialed (as previously used by Pt) with poor outcome and continued aspiration. Images from MBSS reviewed with patient/ family and education provided. Reviewed aspiration precautions. Encouraged patient and/or family to engage SLP in unstructured Q&A session relative to identified deficit areas; indicated understanding of all information provided via satisfactory verbal response. Patient completed tongue-base retraction exercises and laryngeal elevation exercises with fair outcome. Patient varied pitch with fair+ outcome only when mirroring clincian. Patient was encouraged to complete exercises 2-3 more times today. LANGUAGE:      Patient repeatedly went on tangents during session and had difficulty terminating turn in conversation without cues. Patient assumes previous knowledge of conversation partner and benefits from cues to expand answers. COGNITION:       Patient had difficulty keeping track of number of repetitions completed when performing exercises for OT and swallowing and repeatedly responded that he had only done a significantly lower number of repetions than he had actually performed. Patient denies this is due to poor recall and insists he tries to secretly go above and beyond what is expected. Patient sequenced steps for everyday tasks 80% accuracy independently. Patient gave acceptable responses to questions regarding personal safety with ~60% accuracy and minimal cues to focus Patient on topic. Often Patient gave wild scenarios for safety questions. Safety:  Fair-  Other therapies (PT, OT) report some concern with safety. SPEECH:     Difficult to understand at times due to facial weakness and absence of teeth. SP recommended after discharge: yes  Supervision recommended at discharge: 24 hour    Will continue SP intervention as per previously established POC.     Minute Tracking:    Individual therapy:   60 minutes  Concurrent therapy:    0  minutes  Group therapy:     0 minutes  Co-treatment therapy:    0 minutes    Total minutes for 2/11/2021: 60 minutes    Maikol Martin   SLP student intern

## 2021-02-12 PROCEDURE — 6370000000 HC RX 637 (ALT 250 FOR IP): Performed by: PHYSICAL MEDICINE & REHABILITATION

## 2021-02-12 PROCEDURE — 97530 THERAPEUTIC ACTIVITIES: CPT

## 2021-02-12 PROCEDURE — 94640 AIRWAY INHALATION TREATMENT: CPT

## 2021-02-12 PROCEDURE — 1280000000 HC REHAB R&B

## 2021-02-12 PROCEDURE — 97110 THERAPEUTIC EXERCISES: CPT

## 2021-02-12 PROCEDURE — 6360000002 HC RX W HCPCS: Performed by: PHYSICAL MEDICINE & REHABILITATION

## 2021-02-12 PROCEDURE — 97535 SELF CARE MNGMENT TRAINING: CPT

## 2021-02-12 PROCEDURE — 97129 THER IVNTJ 1ST 15 MIN: CPT

## 2021-02-12 PROCEDURE — 97130 THER IVNTJ EA ADDL 15 MIN: CPT

## 2021-02-12 PROCEDURE — 6370000000 HC RX 637 (ALT 250 FOR IP): Performed by: CLINICAL NURSE SPECIALIST

## 2021-02-12 RX ADMIN — ACETAMINOPHEN 650 MG: 325 TABLET, FILM COATED ORAL at 20:27

## 2021-02-12 RX ADMIN — POLYVINYL ALCOHOL 1 DROP: 14 SOLUTION/ DROPS OPHTHALMIC at 08:00

## 2021-02-12 RX ADMIN — POLYVINYL ALCOHOL 1 DROP: 14 SOLUTION/ DROPS OPHTHALMIC at 12:09

## 2021-02-12 RX ADMIN — GLYCOPYRROLATE 1 MG: 1 TABLET ORAL at 08:00

## 2021-02-12 RX ADMIN — IPRATROPIUM BROMIDE AND ALBUTEROL SULFATE 1 AMPULE: 2.5; .5 SOLUTION RESPIRATORY (INHALATION) at 20:47

## 2021-02-12 RX ADMIN — Medication: at 20:28

## 2021-02-12 RX ADMIN — METOPROLOL TARTRATE 25 MG: 25 TABLET, FILM COATED ORAL at 08:00

## 2021-02-12 RX ADMIN — RIFAXIMIN 550 MG: 550 TABLET ORAL at 08:00

## 2021-02-12 RX ADMIN — Medication: at 08:02

## 2021-02-12 RX ADMIN — ASPIRIN 81 MG: 81 TABLET, CHEWABLE ORAL at 08:00

## 2021-02-12 RX ADMIN — IPRATROPIUM BROMIDE AND ALBUTEROL SULFATE 1 AMPULE: 2.5; .5 SOLUTION RESPIRATORY (INHALATION) at 16:39

## 2021-02-12 RX ADMIN — ATORVASTATIN CALCIUM 20 MG: 20 TABLET, FILM COATED ORAL at 20:27

## 2021-02-12 RX ADMIN — APIXABAN 5 MG: 5 TABLET, FILM COATED ORAL at 20:27

## 2021-02-12 RX ADMIN — METOPROLOL TARTRATE 25 MG: 25 TABLET, FILM COATED ORAL at 20:27

## 2021-02-12 RX ADMIN — MINERAL OIL AND WHITE PETROLATUM: 150; 830 OINTMENT OPHTHALMIC at 20:28

## 2021-02-12 RX ADMIN — RIFAXIMIN 550 MG: 550 TABLET ORAL at 20:27

## 2021-02-12 RX ADMIN — APIXABAN 5 MG: 5 TABLET, FILM COATED ORAL at 08:03

## 2021-02-12 RX ADMIN — PANTOPRAZOLE SODIUM 40 MG: 40 TABLET, DELAYED RELEASE ORAL at 06:21

## 2021-02-12 RX ADMIN — POLYVINYL ALCOHOL 1 DROP: 14 SOLUTION/ DROPS OPHTHALMIC at 20:28

## 2021-02-12 RX ADMIN — POLYVINYL ALCOHOL 1 DROP: 14 SOLUTION/ DROPS OPHTHALMIC at 16:00

## 2021-02-12 RX ADMIN — POLYETHYLENE GLYCOL 3350 17 G: 17 POWDER, FOR SOLUTION ORAL at 07:59

## 2021-02-12 RX ADMIN — ALBUTEROL SULFATE 1.25 MG: 1.25 SOLUTION RESPIRATORY (INHALATION) at 14:14

## 2021-02-12 RX ADMIN — IPRATROPIUM BROMIDE AND ALBUTEROL SULFATE 1 AMPULE: 2.5; .5 SOLUTION RESPIRATORY (INHALATION) at 14:14

## 2021-02-12 ASSESSMENT — PAIN DESCRIPTION - ONSET: ONSET: GRADUAL

## 2021-02-12 ASSESSMENT — PAIN SCALES - GENERAL
PAINLEVEL_OUTOF10: 10
PAINLEVEL_OUTOF10: 0
PAINLEVEL_OUTOF10: 0

## 2021-02-12 ASSESSMENT — PAIN - FUNCTIONAL ASSESSMENT: PAIN_FUNCTIONAL_ASSESSMENT: ACTIVITIES ARE NOT PREVENTED

## 2021-02-12 ASSESSMENT — PAIN DESCRIPTION - DESCRIPTORS: DESCRIPTORS: ACHING;CONSTANT;CRUSHING

## 2021-02-12 ASSESSMENT — PAIN DESCRIPTION - PROGRESSION: CLINICAL_PROGRESSION: NOT CHANGED

## 2021-02-12 NOTE — PROGRESS NOTES
Progress Note  Date:2021       Room:5505/5505-  Patient Name:Isael Rachel     Date of Birth:     Age:76 y.o. Subjective    Subjective:  Symptoms:  Stable. He reports weakness. Diet:  NPO. Activity level: Impaired due to weakness. Pain:  He reports no pain. Review of Systems   Neurological: Positive for weakness. Objective         Vitals Last 24 Hours:  TEMPERATURE:  Temp  Av.6 °F (36.4 °C)  Min: 97.6 °F (36.4 °C)  Max: 97.6 °F (36.4 °C)  RESPIRATIONS RANGE: Resp  Av  Min: 16  Max: 16  PULSE OXIMETRY RANGE: SpO2  Av %  Min: 96 %  Max: 100 %  PULSE RANGE: Pulse  Av.7  Min: 82  Max: 101  BLOOD PRESSURE RANGE: Systolic (57BPG), SZT:246 , Min:107 , RGQ:570   ; Diastolic (49VUE), BQA:28, Min:69, Max:81    I/O (24Hr): Intake/Output Summary (Last 24 hours) at 2021 0830  Last data filed at 2021 0736  Gross per 24 hour   Intake 600 ml   Output 575 ml   Net 25 ml     Objective:  General Appearance: In no acute distress. Vital signs: (most recent): Blood pressure 127/81, pulse 82, temperature 97.6 °F (36.4 °C), temperature source Temporal, resp. rate 16, height 6' 3\" (1.905 m), weight 212 lb 8 oz (96.4 kg), SpO2 99 %. Vital signs are normal.    Output: Producing urine and producing stool. HEENT: (Trach capped)    Lungs:  Normal effort and normal respiratory rate. Breath sounds clear to auscultation. Heart: Normal rate. Regular rhythm. S1 normal and S2 normal.    Abdomen: Abdomen is soft. Bowel sounds are normal.   There is no abdominal tenderness. Extremities: Normal range of motion. Neurological: Patient is alert. (4/5 str). Skin:  (Stage II decubitus of the buttocks present on admission  There is a small open area on the right shin that was also present on admission)    Labs/Imaging/Diagnostics    Labs:  CBC:No results for input(s): WBC, RBC, HGB, HCT, MCV, RDW, PLT in the last 72 hours.   CHEMISTRIES:  Recent Labs 02/09/21  1010      K 4.8      CO2 25   BUN 25*   CREATININE 0.7   GLUCOSE 79     PT/INR:No results for input(s): PROTIME, INR in the last 72 hours. APTT:No results for input(s): APTT in the last 72 hours. LIVER PROFILE:No results for input(s): AST, ALT, BILIDIR, BILITOT, ALKPHOS in the last 72 hours. Imaging Last 24 Hours:  No results found. Assessment//Plan           Hospital Problems           Last Modified POA    Acute metabolic encephalopathy 0/6/8517 Yes    Severe protein-calorie malnutrition (HCC) (Chronic) 2/4/2021 Yes      Functional Assessment:      Date   Status   AE    Comments     Feeding   2/9/21   NPO             Grooming   2/7/21   CGA   seated               Oral Care   2/3/21   Min A             Bathing   2/8/21   Mod A Seated         UB Dressing   2/8/21   Min A             LB Dressing   2/9/21   Max/Mod A   reacher         Footwear   2/9/21   Mod A             Toileting   2/3/21   Max A             Homemaking   2/10/21   SBA   Laundry              Functional Transfers / Balance:      Date Status DME  Comments   Sit Balance 2/9/21   Min/CGA       Stand Balance 2/7/21   Mod A x2       []? Tub  []? Shower   Transfer     TBA-  Pt sponge bathed at EOB PTA.       Commode   Transfer 2/11/21   Max x1-2 to Mod A Bariatric 3-in-1, transfer board Pt required Max A x1-2 for transfer from w/c> commode this date. Increased assist required to initiate transfer. Mod cues for sequencing/safety required to complete. Pt completed transfer back to chair with Mod A to complete and Min cues for sequencing/safety. Transfer to/from w/c both completed on a level plane. Functional   Mobility 2/11/21   Min A  W/c Completed within OT clinic. Pt requires Min A to maneuver safely around various environmental obstacles d/t pt running into table repeatedly with assist required to recognize and correct.     Other:  Bed>W/c        Supine to EOB    2/9/21 2/8/21    Max A         Min/CGA    Transfer board     Bed rail         Assessment:    Condition: In stable condition. Improving. (Metabolic encephalopathy). Plan:   Encourage ambulation. (Improving in therapy  He is moving better  He did still fail his most recent video  Still requires the trach and PEG tube).        Electronically signed by Sai Umanzor MD on 2/12/21 at 8:30 AM EST

## 2021-02-12 NOTE — PROGRESS NOTES
While patient was with OT a scab on right forearm was accidentally peeled off and started to bleed. Area was cleansed with NS and dry dry dressing applied. New dressing orders placed as well. RL leg dressing changed this morning as well. Area cleansed with NS , adaptic applied, then covered with ABD , wrapped with kerlex. Patient tolerated well.

## 2021-02-12 NOTE — PROGRESS NOTES
Speech Language Pathology  ACUTE REHABILITATION--DAILY PROGRESS NOTE          ADMITTING DIAGNOSIS: Acute metabolic encephalopathy [N34.76]     SPEECH PATHOLOGY DIAGNOSIS:    Mild-moderate cognitive deficit. 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 Botox received in 2008 with short term success). Decreased intelligibility due to weakness, missing dentition, fast rate and resonance issues.     THERAPY RECOMMENDATIONS:   Speech Pathology intervention is recommended 3-6 times per week for LOS or when goals are met with emphasis on the following:      Improve STM recall, sequencing and problem solving for increased functional independence with completion of ADLs/IADLs to a FIM level commensurate with modified independent.                                                                                                                                         FIMS SCORES                            Swallowing                          Current Status             1--Total Assistance                   Short Term Goal         4--Minimal Assistance               Long Term Goal          5--Supervision      Receptive                          Current Status             5--Supervision               Short Term Goal         6--Modified Independent                       Long Term Goal          6--Modified Independent              Expressive                          Current Status             5--Supervision               Short Term Goal         6--Modified Independent                       Long Term Goal          6--Modified Independent              Social Interaction                          Current Status             6--Modified Independent                       Short Term Goal         6--Modified Independent                       Long Term Goal          6--Modified Independent                                                             Problem Solving Current Status            4--Minimal Assistance               Short Term Goal         5--Supervision               Long Term Goal          6--Modified Independent                          Memory                          Current Status            4--Minimal Assistance  / 3--Moderate Assistance                     Short Term Goal         4--Minimal Assistance   Long Term Goal          5--Supervision                                              SWALLOWING:      Diet:  NPO (nothing by mouth including oral meds). PEG tube in place. Chronic dysphagia reported but inconsistent information provided by patient. Lengthy case history taken by other SLP from patient and from pulmonary NP- see MBS report. Note from 2/4: Speech Pathologist (SLP) completed education with the patient/family regarding procedure of Modified Barium Swallow Study prior to exam and then type of swallowing impairment following completion of MBSS. Reviewed current solid/liquid consistency diet recommendations (NPO (nothing by mouth including oral meds). Discussed strategies trialed (as previously used by Pt) with poor outcome and continued aspiration. Images from MBSS reviewed with patient/ family and education provided. Reviewed aspiration precautions. Encouraged patient and/or family to engage SLP in unstructured Q&A session relative to identified deficit areas; indicated understanding of all information provided via satisfactory verbal response. Patient reported completing tongue-base retraction exercises and laryngeal elevation exercises independently this date. LANGUAGE:     Patient participated well in conversational speech with SLP and SLP student. Expressive and receptive language observed to be within functional limits. COGNITION:        Patient recalled information from recorded voice mails with 100% accuracy and inferred information from recorded voicemails with 80% accuracy.   Patient benefit from minimal to moderate verbal cues and listening to voicemail recordings a second time. Patient read everyday items (i.e., menus, grocery lists) and answered increasingly difficult questions with 100% accuracy. Safety:  Fair-  Other therapies (PT, OT) report some concern with safety. SPEECH:     Difficult to understand at times due to facial weakness and absence of teeth. SP recommended after discharge: yes  Supervision recommended at discharge: 24 hour    Will continue SP intervention as per previously established POC.     Minute Tracking:    Individual therapy:   45 minutes  Concurrent therapy:    0  minutes  Group therapy:     0 minutes  Co-treatment therapy:    0 minutes    Total minutes for 2/12/2021: 45 minutes    Zay Franks.  SLP Student

## 2021-02-12 NOTE — PROGRESS NOTES
OCCUPATIONAL THERAPY DAILY NOTE    Date:2021  Patient Name: Vivek Self  MRN: [id number]  : 1945  Room: [address] [place] [address]     Diagnosis: RF secondary to aspiration @ home, trach & peg  Additional Pertinent Hx: CHF, COPD, hx CVA, dysphagia, peg heart disease  Precautions: Falls, NPO, Peg    Functional Assessment:   Date Status AE  Comments   Feeding 21 NPO     Grooming 21 SBA seated Seated at EOB pt washed/dried face and hands with SBA for dynamic sitting balance/safety. Oral Care 21 SBA/S  Pt complete oral care seated at EOB with assist required to open mouthwash this date. Bathing 21 Mod A Seated Sponge bath completed seated at EOB with assist required to wash feet, upper arm of L UE and buttocks. Min cues for safety required with fair follow through. Pt demo'd increased dynamic sitting balance and EOB during bathing task this date. Pt returns to supine to wash philip area/buttocks however limited by decreased functional reach. UB Dressing 21 SBA  Seated at EOB pt don/doff pull over shirt with SBA for dynamic sitting balance/safety. LB Dressing 21 Max A  To don brief and pants seated at EOB. Pt able to thread L LE into pants utilizing leg cross over technique. Pt requires assist to thread R LE into brief/pants. Pt deferred use of reacher this date. Pt returned to supine and required assist to manage clothing over hips. Min cues for safety provided with fair/- carryover. Footwear 21 Mod A  To don/doff socks this date. No shoes present during morning ADL. Pt required assist to don R sock, able to don L sock utilizing leg cross over technique. Toileting 2/3/21 Max A     Homemaking 2/10/21 SBA Laundry      Functional Transfers / Balance:   Date Status DME  Comments   Sit Balance 21 SBA  Seated at EOB during dynamic ADL tasks. Pt demo'd increased dynamic sitting balance at EOB during morning ADL this date.     PM: Pt engaged in seat level trunk control/core strength ex's using dowel with BUE's during forwards bends tolerating 3-5 reps of 10 ea. Pt engaged in reacher activity to  bean bags off floor along with rings with arc to improve LB dressing skills along with core strength for ADL's. Pt engaged in trunk ex's leaning one forearm then alternating to other to increase trunk control for functional transfers along with strength & endurance. Pt required demo and cues to increase follow thru. Stand Balance 2/7/21 Mod A x2     [] Tub  [] Shower   Transfer  TBA-  Pt sponge bathed at EOB PTA. Commode   Transfer 2/11/21 Max x1-2 to Mod A Bariatric 3-in-1, transfer board    Functional   Mobility 2/12/21 Min A  W/c Maneuvering in pt's room and OT clinic with assist required to safely maneuver around obstacles and through door ways. Min cues for awareness required d/t running into objects. Other:  Bed>W/c      Supine to EOB   2/12/21 2/12/21   Max/Mod A      SBA   Transfer board    Bed rail Assist required for board placement/removal and to complete transfer. For safety. Functional Exercises / Activity:  -Shoulder arc completed seated at table with B UE's with focus on increasing B UE strength/ROM and overall endurance for functional activities. Fair tolerance with L UE, Fair- tolerance with R UE d/t limited ROM. -Towel stretches completed on incline to increase B shoulder AROM and provide gentle stretch. Fair tolerance. PM: Pt engaged in seat level trunk control/core strength ex's during afternoon session using wooden dowel shane with BUE's then without needing demo and vc's to increase follow thru to promote overall strength, endurance along with core strengthening for functional transfers while up in w/c.        Sensory / Neuromuscular Re-Education:      Cognitive Skills:   Status Comments   Problem   Solving fair     Memory fair  \"   Sequencing fair     Safety fair -      Visual Perception:    Education:  Pt educated on safety during ADL tasks and transfer board technique/safety. Pt verbalized/demonstrated fair/- understanding, recommend continued education. [] Family teach completed on:    Pain Level: Pt c/o pain from chapped lips during AM tx session. Pt provided chapstick for pain relief. Additional Notes:       Patient has made fair  progress during treatment sessions toward set goals. Therapy emphasis to obtain goals:  Current Treatment Recommendations: Strengthening, Gait Training, Patient/Caregiver Education & Training, Home Management Training, ROM, Equipment Evaluation, Education, & procurement, Balance Training, Functional Mobility Training, Endurance Training, Wheelchair Mobility Training, Safety Education & Training, Self-Care / ADL      Long term goal 2: Pt demo Mod I grooiming seated  Long term goal 3: Pt demo Min A bathing @ sink level or shower level seated  Long term goal 4: Pt demo s/u UE & Min A LE dress to don depends, pants, socks & shoes using AE as needed  Long term goal 5: Pt demo Min-Mod  A commode trf wtih appropriate DME to ensure pt safety  Long term goals 6: Pt demo Min-Mod A tub trf with appropriate DME to ensure pt safety  Long term goal 7: Pt demo Sup light homemaking activity @ wc level  Long term goal 8: Pt demo G- endurance for a 20 minute funcitonal activity  Long term goal 9: Pt demo increase BUE strength to 4+/5 to improve pt ability to complete ADLs  Long term goal 10: Pt demo Mod I wc propulsion throughout a living environment & around obstacles    [x] Continue with current OT Plan of care.   [] Prepare for Discharge     DISCHARGE RECOMMENDATIONS  Recommended DME: transfer board, bariatric 3-in-1    Post Discharge Care:   []Home Independently  [x]Home with 24hr Care / Supervision []Home with Partial Supervision []Home with Home Health OT []Home with Out Pt OT []Other: ___   Comments:         Time in Time out Tx Time Breakdown  Variance:   First Session  0800 0915 [x] Individual Tx-75 Mins  [] Concurrent Tx-   [] Co-Tx -   [] Group Tx -   [] Time Missed -     Second Session 14:30pm 15:15pm [x] Individual Tx- 45 Mins  [] Concurrent Tx -  [] Co-Tx -   [] Group Tx -   [] Time Missed -     Third Session    [] Individual Tx-   [] Concurrent Tx -  [] Co-Tx -   [] Group Tx -   [] Time Missed -         Total Tx Time: 130 Mins      [facility] [place] [address] [place] [address]

## 2021-02-12 NOTE — CARE COORDINATION
Patient son had to cancel FT today - he has is \"shaking\"  Has a history of Parkinson's. He doesn't feel able to drive at the moment.     Rescheduled for 2/16 am.    Jose Gallego

## 2021-02-12 NOTE — PROGRESS NOTES
Physical Therapy  Treatment Note  Evaluating Therapist: Varun Blood DPT    NAME: Isreal Hernandez  ROOM: 4802M  DIAGNOSIS: Acute metabolic encephalopathy  PRECAUTIONS: Falls, PEG tube, trach (capped), L hemiparesis, LE proprioceptive deficits  HPI: 76year old male admitted to [facility] on 11/18/20 with respiratory failure secondary to witnessed aspiration event at home - chronic dysphagia with feeding tube. Intubated twice at [facility] with subsequent trach placement. Admitted to [facility] for vent weaning. Transferred to ARU 2/1. Social:  Pt lives with his son in a trailer with 8 steps with B rails or a ramp to enter. Prior to admission pt ambulated with [employer] Mod I. Initial Evaluation  2/3/21 AM     PM    Short Term Goals Long Term Goals    Was pt agreeable to Eval/treatment? yes yes yes     Does pt have pain? L wrist and R shoulder pain L wrist and B shoulder pain L wrist and B shoulder pain     Bed Mobility  Rolling: SBA  Supine to sit: SBA  Sit to supine: SBA  Scooting: SBA SBA all aspects Supervision all aspects Supervision Independent   Transfers Sit to stand: Max A x2  Stand to sit: Max A x2  Stand pivot: NT  Slideboard: Max A Sit to stand: Max A  Stand to sit: Max A  Stand pivot: NT  Slideboard: NT Sit to stand: Max A  Stand to sit:  Max A  Stand pivot: NT  Slideboard: SBA (assist for slideboard placement and removal) Min A SBA   Ambulation   NT 10 feet x2 reps in parallel bars with Mod/Max A + WC follow 10 feet x2 reps in parallel bars with Mod/Max A + WC follow 25 feet with [employer] with Min A >75 feet with WW with SBA   Walking 10 feet on uneven surface NT NT NT 10 feet with [employer] with Min A 10 feet with WW with SBA   Wheel Chair Mobility 100 feet  feet Supervision 200 feet Supervision >200 feet Supervision >200 feet Independent   Car Transfers NT NT NT Min A SBA   Stair negotiation: ascended and descended NT NT NT 4 steps with B rail with Min A 4 steps with B rail with SBA   Curb Step:   ascended and descended NT NT NT     Picking up object off the floor NT NT NT     BLE ROM [facility] WFL      BLE Strength RLE 4/5  LLE 3+/5 RLE 4/5  LLE 3+/5      Balance  Sitting: SBA   Static stand: Max A x2 Sitting: SBA   Static stand: Max A       Date Family Teach Completed TBA TBA      Is additional Family Teaching Needed? Y or N Y Y      Hindering Progress Debility, L hemiparesis Debility, L hemiparesis      PT recommended ELOS 4-5 weeks       Team's Discharge Plan        Therapist at Team Meeting          Therapeutic Exercise:   AM:   - Sit<> parallel bars with static stand hold for max time (60sec)  PM:  - Slideboard transfer wheelchair<>mat table  - Supine LE PNF 2x20/side to improve LE strength and motor control  - Bridges 2x10 to improve hip extension strength    Additional Comments: Pt continues to have significant functional deficits as a result of the pt's debility and impaired strength levels. Pt still requires heavy assistance to perform sit<>stand transfer and ambulation in parallel bars. Pt ambulates in parallel bars with B flexed knees and a forward flexed posture. PT assists pt with weight shifting his hips in order to clear the foot during swing phase. Pt's son was supposed to be present for family training in PM however he cancelled. Pt continues to make good progress with slideboard transfers but has not made much progress in terms of sit<>stand transfers and ambulation. Incorporated various exercises today in effort to improve pt's BLE strength and motor control. Future PT sessions will continue to emphasize improving the pt's ability to function at a wheelchair level in addition with therapeutic activities such as walking.     Patient/family education  Pt/family educated on standing posture, ambulation mechanics in parallel bars, slideboard transfer technique/safety    Patient/family response to education:   Pt/family verbalized understanding Pt/family demonstrated skill Pt/family requires further education in this area   yes With cues/assist yes     AM  Time in: 0915  Time out: 1000  PM   Time in: 1515  Time out: 1600    Pt is making fair progress toward established Physical Therapy goals. Continue with physical therapy current plan of care.     Terry Agarwal DPT [id number]

## 2021-02-13 PROCEDURE — 6370000000 HC RX 637 (ALT 250 FOR IP): Performed by: PHYSICAL MEDICINE & REHABILITATION

## 2021-02-13 PROCEDURE — 6370000000 HC RX 637 (ALT 250 FOR IP): Performed by: CLINICAL NURSE SPECIALIST

## 2021-02-13 PROCEDURE — 97530 THERAPEUTIC ACTIVITIES: CPT

## 2021-02-13 PROCEDURE — 1280000000 HC REHAB R&B

## 2021-02-13 PROCEDURE — 92526 ORAL FUNCTION THERAPY: CPT

## 2021-02-13 PROCEDURE — 94640 AIRWAY INHALATION TREATMENT: CPT

## 2021-02-13 PROCEDURE — 97129 THER IVNTJ 1ST 15 MIN: CPT

## 2021-02-13 RX ADMIN — IPRATROPIUM BROMIDE AND ALBUTEROL SULFATE 1 AMPULE: 2.5; .5 SOLUTION RESPIRATORY (INHALATION) at 08:03

## 2021-02-13 RX ADMIN — MINERAL OIL AND WHITE PETROLATUM: 150; 830 OINTMENT OPHTHALMIC at 21:30

## 2021-02-13 RX ADMIN — ATORVASTATIN CALCIUM 20 MG: 20 TABLET, FILM COATED ORAL at 21:30

## 2021-02-13 RX ADMIN — IPRATROPIUM BROMIDE AND ALBUTEROL SULFATE 1 AMPULE: 2.5; .5 SOLUTION RESPIRATORY (INHALATION) at 11:56

## 2021-02-13 RX ADMIN — POLYVINYL ALCOHOL 1 DROP: 14 SOLUTION/ DROPS OPHTHALMIC at 07:51

## 2021-02-13 RX ADMIN — APIXABAN 5 MG: 5 TABLET, FILM COATED ORAL at 21:30

## 2021-02-13 RX ADMIN — ASPIRIN 81 MG: 81 TABLET, CHEWABLE ORAL at 07:47

## 2021-02-13 RX ADMIN — PANTOPRAZOLE SODIUM 40 MG: 40 TABLET, DELAYED RELEASE ORAL at 06:12

## 2021-02-13 RX ADMIN — METOPROLOL TARTRATE 25 MG: 25 TABLET, FILM COATED ORAL at 21:30

## 2021-02-13 RX ADMIN — GLYCOPYRROLATE 1 MG: 1 TABLET ORAL at 07:50

## 2021-02-13 RX ADMIN — POLYVINYL ALCOHOL 1 DROP: 14 SOLUTION/ DROPS OPHTHALMIC at 17:15

## 2021-02-13 RX ADMIN — RIFAXIMIN 550 MG: 550 TABLET ORAL at 21:30

## 2021-02-13 RX ADMIN — POLYVINYL ALCOHOL 1 DROP: 14 SOLUTION/ DROPS OPHTHALMIC at 21:30

## 2021-02-13 RX ADMIN — POLYVINYL ALCOHOL 1 DROP: 14 SOLUTION/ DROPS OPHTHALMIC at 13:05

## 2021-02-13 RX ADMIN — IPRATROPIUM BROMIDE AND ALBUTEROL SULFATE 1 AMPULE: 2.5; .5 SOLUTION RESPIRATORY (INHALATION) at 15:36

## 2021-02-13 RX ADMIN — APIXABAN 5 MG: 5 TABLET, FILM COATED ORAL at 07:47

## 2021-02-13 RX ADMIN — METOPROLOL TARTRATE 25 MG: 25 TABLET, FILM COATED ORAL at 07:47

## 2021-02-13 RX ADMIN — IPRATROPIUM BROMIDE AND ALBUTEROL SULFATE 1 AMPULE: 2.5; .5 SOLUTION RESPIRATORY (INHALATION) at 19:45

## 2021-02-13 RX ADMIN — POLYETHYLENE GLYCOL 3350 17 G: 17 POWDER, FOR SOLUTION ORAL at 07:47

## 2021-02-13 RX ADMIN — RIFAXIMIN 550 MG: 550 TABLET ORAL at 07:58

## 2021-02-13 ASSESSMENT — PAIN SCALES - GENERAL: PAINLEVEL_OUTOF10: 2

## 2021-02-13 NOTE — PROGRESS NOTES
Progress Note  Date:2021       Room:5505/5505-  Patient Name:Isael Rachel     Date of Birth:     Age:76 y.o. Subjective    Subjective:  Symptoms:  Stable. He reports weakness. Diet:  NPO. Activity level: Impaired due to weakness. Pain:  He reports no pain. Review of Systems   Neurological: Positive for weakness. Objective         Vitals Last 24 Hours:  TEMPERATURE:  Temp  Av.7 °F (36.5 °C)  Min: 97.4 °F (36.3 °C)  Max: 98 °F (36.7 °C)  RESPIRATIONS RANGE: Resp  Av  Min: 16  Max: 28  PULSE OXIMETRY RANGE: SpO2  Av %  Min: 97 %  Max: 100 %  PULSE RANGE: Pulse  Av.5  Min: 79  Max: 84  BLOOD PRESSURE RANGE: Systolic (69HHP), NWS:594 , Min:107 , TYI:643   ; Diastolic (04UWH), OAH:15, Min:65, Max:69    I/O (24Hr): Intake/Output Summary (Last 24 hours) at 2021 0833  Last data filed at 2021 0635  Gross per 24 hour   Intake 1430 ml   Output 500 ml   Net 930 ml     Objective:  General Appearance: In no acute distress. Vital signs: (most recent): Blood pressure 107/69, pulse 79, temperature 98 °F (36.7 °C), temperature source Oral, resp. rate 16, height 6' 3\" (1.905 m), weight 212 lb 8 oz (96.4 kg), SpO2 97 %. Vital signs are normal.    Output: Producing urine and producing stool. HEENT: (Trach)    Lungs:  Normal effort and normal respiratory rate. Breath sounds clear to auscultation. Heart: Normal rate. Regular rhythm. S1 normal and S2 normal.    Abdomen: Abdomen is soft. Bowel sounds are normal.   There is no abdominal tenderness. Extremities: Normal range of motion. Neurological: Patient is alert. (4/5 strength). Labs/Imaging/Diagnostics    Labs:  CBC:No results for input(s): WBC, RBC, HGB, HCT, MCV, RDW, PLT in the last 72 hours. CHEMISTRIES:No results for input(s): NA, K, CL, CO2, BUN, CREATININE, GLUCOSE, PHOS, MG in the last 72 hours.     Invalid input(s): CA  PT/INR:No results for input(s): PROTIME, INR in the last 72 hours. APTT:No results for input(s): APTT in the last 72 hours. LIVER PROFILE:No results for input(s): AST, ALT, BILIDIR, BILITOT, ALKPHOS in the last 72 hours. Imaging Last 24 Hours:  No results found. Assessment//Plan           Hospital Problems           Last Modified POA    Acute metabolic encephalopathy 4/6/1842 Yes    Severe protein-calorie malnutrition (HCC) (Chronic) 2/4/2021 Yes        Assessment:    Condition: In stable condition. Improving. (Metabolic encephalopathy). Plan:   Encourage ambulation. (Tolerating therapy well  Switched over to bolus tube feeding  Improving strength  At least at this point the plan is to leave the trach in place although he is tolerated plugging very well).        Electronically signed by Ramo Greco MD on 2/13/21 at 8:33 AM EST

## 2021-02-13 NOTE — PROGRESS NOTES
Date Family Teach Completed TBA TBA     Is additional Family Teaching Needed? Y or N Y Y     Hindering Progress Debility, L hemiparesis Debility, L hemiparesis     PT recommended ELOS 4-5 weeks      Team's Discharge Plan       Therapist at Team Meeting         Therapeutic Exercise:   AM:   Sit <>Stand x5 reps in parallel bars and holding standing position to increased standing tolerance. Additional Comments: Pt continues to have significant functional deficits as a result of the pt's debility and impaired strength levels. Pt still requires heavy assistance to perform sit<>stand transfer and ambulation in parallel bars. Pt ambulates in parallel bars with B flexed knees and a forward flexed posture. Increased assistance noted with slide board transfer from hospital bed to wc. Patient/family education  Pt/family educated on standing posture, ambulation mechanics in parallel bars, slideboard transfer technique/safety    Patient/family response to education:   Pt/family verbalized understanding Pt/family demonstrated skill Pt/family requires further education in this area   x X with verbal cues and assistance  x      Time in 0930  Time out 1000  Pt is making fair progress toward established Physical Therapy goals. Continue with physical therapy current plan of care.   Marco Antonio Alexis [id number]

## 2021-02-13 NOTE — PROGRESS NOTES
Speech Language Pathology  ACUTE REHABILITATION--DAILY PROGRESS NOTE          ADMITTING DIAGNOSIS: Acute metabolic encephalopathy [W76.13]     SPEECH PATHOLOGY DIAGNOSIS:    Mild-moderate cognitive deficit. 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 Botox received in 2008 with short term success). Decreased intelligibility due to weakness, missing dentition, fast rate and resonance issues.     THERAPY RECOMMENDATIONS:   Speech Pathology intervention is recommended 3-6 times per week for LOS or when goals are met with emphasis on the following:      Improve STM recall, sequencing and problem solving for increased functional independence with completion of ADLs/IADLs to a FIM level commensurate with modified independent.                                                                                                                                         FIMS SCORES                            Swallowing                          Current Status             1--Total Assistance                   Short Term Goal         4--Minimal Assistance               Long Term Goal          5--Supervision      Receptive                          Current Status             5--Supervision               Short Term Goal         6--Modified Independent                       Long Term Goal          6--Modified Independent              Expressive                          Current Status             5--Supervision               Short Term Goal         6--Modified Independent                       Long Term Goal          6--Modified Independent              Social Interaction                          Current Status             6--Modified Independent                       Short Term Goal         6--Modified Independent                       Long Term Goal          6--Modified Independent                                                             Problem Solving                          Current Status            4--Minimal Assistance               Short Term Goal         5--Supervision               Long Term Goal          6--Modified Independent                          Memory                          Current Status            4--Minimal Assistance  / 3--Moderate Assistance                     Short Term Goal         4--Minimal Assistance   Long Term Goal          5--Supervision                                              SWALLOWING:      Diet:  NPO (nothing by mouth including oral meds). PEG tube in place. Chronic dysphagia reported but inconsistent information provided by patient. Lengthy case history taken by other SLP from patient and from pulmonary NP- see MBS report. Note from 2/4: Speech Pathologist (SLP) completed education with the patient/family regarding procedure of Modified Barium Swallow Study prior to exam and then type of swallowing impairment following completion of MBSS. Reviewed current solid/liquid consistency diet recommendations (NPO (nothing by mouth including oral meds). Discussed strategies trialed (as previously used by Pt) with poor outcome and continued aspiration. Images from MBSS reviewed with patient/ family and education provided. Reviewed aspiration precautions. Encouraged patient and/or family to engage SLP in unstructured Q&A session relative to identified deficit areas; indicated understanding of all information provided via satisfactory verbal response. Patient reported completing tongue-base retraction exercises and laryngeal elevation exercises independently this date. These were also reviewed with the patient along with laryngeal elevation exercises. LANGUAGE:     Patient participated well in conversational speech with SLP and SLP student. Expressive and receptive language observed to be within functional limits. COGNITION:        Patient recalled information from past history.  He gave details while completing narratives. Short term memory for ADLS was latent and with some inconsistencies. Safety:  Fair-  Other therapies (PT, OT) report some concern with safety. SPEECH:     Difficult to understand at times due to facial weakness and absence of teeth. SP recommended after discharge: yes  Supervision recommended at discharge: 24 hour    Will continue SP intervention as per previously established POC.     Minute Tracking:    Individual therapy:   45 minutes  Concurrent therapy:    0  minutes  Group therapy:     0 minutes  Co-treatment therapy:    0 minutes    Total minutes for 2/13/2021: 45 minutes

## 2021-02-14 PROCEDURE — 6370000000 HC RX 637 (ALT 250 FOR IP): Performed by: PHYSICAL MEDICINE & REHABILITATION

## 2021-02-14 PROCEDURE — 97530 THERAPEUTIC ACTIVITIES: CPT

## 2021-02-14 PROCEDURE — 1280000000 HC REHAB R&B

## 2021-02-14 PROCEDURE — 94640 AIRWAY INHALATION TREATMENT: CPT

## 2021-02-14 PROCEDURE — 6370000000 HC RX 637 (ALT 250 FOR IP): Performed by: CLINICAL NURSE SPECIALIST

## 2021-02-14 PROCEDURE — 97110 THERAPEUTIC EXERCISES: CPT

## 2021-02-14 RX ADMIN — ATORVASTATIN CALCIUM 20 MG: 20 TABLET, FILM COATED ORAL at 20:36

## 2021-02-14 RX ADMIN — GLYCOPYRROLATE 1 MG: 1 TABLET ORAL at 09:40

## 2021-02-14 RX ADMIN — ACETAMINOPHEN 650 MG: 325 TABLET, FILM COATED ORAL at 20:37

## 2021-02-14 RX ADMIN — IPRATROPIUM BROMIDE AND ALBUTEROL SULFATE 1 AMPULE: 2.5; .5 SOLUTION RESPIRATORY (INHALATION) at 09:11

## 2021-02-14 RX ADMIN — Medication: at 09:41

## 2021-02-14 RX ADMIN — PANTOPRAZOLE SODIUM 40 MG: 40 TABLET, DELAYED RELEASE ORAL at 06:26

## 2021-02-14 RX ADMIN — POLYVINYL ALCOHOL 1 DROP: 14 SOLUTION/ DROPS OPHTHALMIC at 12:29

## 2021-02-14 RX ADMIN — APIXABAN 5 MG: 5 TABLET, FILM COATED ORAL at 09:40

## 2021-02-14 RX ADMIN — IPRATROPIUM BROMIDE AND ALBUTEROL SULFATE 1 AMPULE: 2.5; .5 SOLUTION RESPIRATORY (INHALATION) at 15:10

## 2021-02-14 RX ADMIN — CHLORHEXIDINE GLUCONATE 0.12% ORAL RINSE 15 ML: 1.2 LIQUID ORAL at 20:36

## 2021-02-14 RX ADMIN — POLYVINYL ALCOHOL 1 DROP: 14 SOLUTION/ DROPS OPHTHALMIC at 17:04

## 2021-02-14 RX ADMIN — APIXABAN 5 MG: 5 TABLET, FILM COATED ORAL at 20:36

## 2021-02-14 RX ADMIN — POLYVINYL ALCOHOL 1 DROP: 14 SOLUTION/ DROPS OPHTHALMIC at 20:37

## 2021-02-14 RX ADMIN — Medication: at 20:55

## 2021-02-14 RX ADMIN — IPRATROPIUM BROMIDE AND ALBUTEROL SULFATE 1 AMPULE: 2.5; .5 SOLUTION RESPIRATORY (INHALATION) at 13:05

## 2021-02-14 RX ADMIN — RIFAXIMIN 550 MG: 550 TABLET ORAL at 20:37

## 2021-02-14 RX ADMIN — IPRATROPIUM BROMIDE AND ALBUTEROL SULFATE 1 AMPULE: 2.5; .5 SOLUTION RESPIRATORY (INHALATION) at 18:40

## 2021-02-14 RX ADMIN — MINERAL OIL AND WHITE PETROLATUM: 150; 830 OINTMENT OPHTHALMIC at 20:36

## 2021-02-14 RX ADMIN — RIFAXIMIN 550 MG: 550 TABLET ORAL at 09:40

## 2021-02-14 RX ADMIN — ASPIRIN 81 MG: 81 TABLET, CHEWABLE ORAL at 09:40

## 2021-02-14 RX ADMIN — CHLORHEXIDINE GLUCONATE 0.12% ORAL RINSE 15 ML: 1.2 LIQUID ORAL at 09:39

## 2021-02-14 RX ADMIN — METOPROLOL TARTRATE 25 MG: 25 TABLET, FILM COATED ORAL at 09:39

## 2021-02-14 RX ADMIN — POLYVINYL ALCOHOL 1 DROP: 14 SOLUTION/ DROPS OPHTHALMIC at 09:40

## 2021-02-14 RX ADMIN — METOPROLOL TARTRATE 25 MG: 25 TABLET, FILM COATED ORAL at 20:36

## 2021-02-14 ASSESSMENT — PAIN DESCRIPTION - DESCRIPTORS: DESCRIPTORS: ACHING

## 2021-02-14 ASSESSMENT — PAIN DESCRIPTION - ONSET: ONSET: ON-GOING

## 2021-02-14 ASSESSMENT — PAIN SCALES - GENERAL
PAINLEVEL_OUTOF10: 10
PAINLEVEL_OUTOF10: 0
PAINLEVEL_OUTOF10: 0

## 2021-02-14 ASSESSMENT — PAIN DESCRIPTION - FREQUENCY: FREQUENCY: CONTINUOUS

## 2021-02-14 NOTE — PROGRESS NOTES
OCCUPATIONAL THERAPY DAILY NOTE    Date:2021  Patient Name: Vivek Self  MRN: [id number]  : 1945  Room: [address] [place], [place] [address]     Diagnosis: RF secondary to aspiration @ home, trach & peg  Additional Pertinent Hx: CHF, COPD, hx CVA, dysphagia, peg heart disease  Precautions: Falls, NPO, Peg    Functional Assessment:   Date Status AE  Comments   Feeding 21 NPO     Grooming 21 SBA seated          Oral Care 21 SBA/S     Bathing 21 Mod A Seated    UB Dressing 21 SBA     LB Dressing 21 Max A     Footwear 21 Mod A      Toileting 2/3/21 Max A Bed level  21 Pt stated got clean up in bed following toilet needs this am needing assistance for hygiene/clothing mgmt. Homemaking 2/10/21 SBA Laundry      Functional Transfers / Balance:   Date Status DME  Comments   Sit Balance 21 SBA  . Sitting balance on EOB during trunk control/core strength ex's unsupported tolerating 2 reps of 10 ea. Sitting balance up in w/c during velcro ball/paddle toss and catch activity to promote core strength using underhand method using RUE and catching using LUE w/paddle. Stand Balance 21 Mod A x2     [] Tub  [] Shower   Transfer  TBA-  Pt sponge bathed at EOB PTA. Commode   Transfer 21 Max x1-2 to Mod A Bariatric 3-in-1, transfer board    Functional   Mobility 21 Min A  W/c Pt propelled w/c from room to gym using BLE's only needing min cues for safety thru doorways and around obstacles to increase awareness. Pt prefers using BLE's vs BUE's due to pain issues    Other:  Bed>W/c      Supine to EOB   21   Min/mod A      SBA   Transfer board    Bed rail Pt requires mod assist to place and remove board with vc's for proper technique keeping posture forwards vs retropulsion and BLE's positioned on floor vs kicking out. Co-worker assisted and present  for patient safety/fall prevention.          Functional Exercises / Activity:  BUE ex's for towel stretches on table top to improve overall joint ROM for functional tasks/transfers and mobility skills. Pt tolerated 3 reps of 10 ea. Pt engaged in leisure activity with velcro paddle ball toss & catch using underhand method tossing with RUE in w/c then catching using LUE to promote overall endurance, ROM, coordination , strength along with trunk control/core strengthening for functional activities along with ADL's, transfers and mobility skills. Sensory / Neuromuscular Re-Education:      Cognitive Skills:   Status Comments   Problem   Solving fair  Demo during leisure activity, w/c propelling    Memory fair  \"   Sequencing fair  \"   Safety fair - Demo during sliding board transfers from bed into w/c. Visual Perception:    Education:  Pt educated on safety during bed to w/c transfers using sliding board due to proper hand/feet placement and decreasing trunk retropulsion during transfer.    [] Family teach completed on:    Pain Level:  None reported     Additional Notes:       Patient has made fair  progress during treatment sessions toward set goals.  Therapy emphasis to obtain goals:  Current Treatment Recommendations: Strengthening, Gait Training, Patient/Caregiver Education & Training, Home Management Training, ROM, Equipment Evaluation, Education, & procurement, Balance Training, Functional Mobility Training, Endurance Training, Wheelchair Mobility Training, Safety Education & Training, Self-Care / ADL      Long term goal 2: Pt demo Mod I grooiming seated  Long term goal 3: Pt demo Min A bathing @ sink level or shower level seated  Long term goal 4: Pt demo s/u UE & Min A LE dress to don depends, pants, socks & shoes using AE as needed  Long term goal 5: Pt demo Min-Mod  A commode trf wtih appropriate DME to ensure pt safety  Long term goals 6: Pt demo Min-Mod A tub trf with appropriate DME to ensure pt safety  Long term goal 7: Pt demo Sup light homemaking activity @ wc level  Long term goal 8: Pt demo G- endurance for a 20 minute funcitonal activity  Long term goal 9: Pt demo increase BUE strength to 4+/5 to improve pt ability to complete ADLs  Long term goal 10: Pt demo Mod I wc propulsion throughout a living environment & around obstacles    [x] Continue with current OT Plan of care.   [] Prepare for Discharge     DISCHARGE RECOMMENDATIONS  Recommended DME: transfer board, bariatric 3-in-1    Post Discharge Care:   []Home Independently  [x]Home with 24hr Care / Supervision []Home with Partial Supervision []Home with Home Health OT []Home with Out Pt OT []Other: ___   Comments:         Time in Time out Tx Time Breakdown  Variance:   First Session  9;45am 10:25am [x] Individual  tx -40 Mins  [] Concurrent Tx-   [] Co-Tx -   [] Group Tx -   [] Time Missed -     Second Session   [] Individual Tx-  Mins  [] Concurrent Tx -  [] Co-Tx -   [] Group Tx -   [] Time Missed -     Third Session    [] Individual Tx-   [] Concurrent Tx -  [] Co-Tx -   [] Group Tx -   [] Time Missed -         Total Tx Time:40 Mins        Epifanio Mirandamomarquise

## 2021-02-14 NOTE — PROGRESS NOTES
02/14/21 1302   Attendance   Activity   ([address])   Participation Active participation   [place] Demonstrates ability to complete social goals   Social Skills Interacts independently in social activity   Leisure Education Demonstrates knowledge of benefits of leisure involvement  Yan Cooper identified coordination and fun as benefits .)   Leisure Attitude/Participation Participates in 1:1 structured activity   Time Spent With Patient   Minutes 35

## 2021-02-15 PROCEDURE — 6370000000 HC RX 637 (ALT 250 FOR IP): Performed by: OPHTHALMOLOGY

## 2021-02-15 PROCEDURE — 97130 THER IVNTJ EA ADDL 15 MIN: CPT

## 2021-02-15 PROCEDURE — 97530 THERAPEUTIC ACTIVITIES: CPT

## 2021-02-15 PROCEDURE — 97129 THER IVNTJ 1ST 15 MIN: CPT

## 2021-02-15 PROCEDURE — 6370000000 HC RX 637 (ALT 250 FOR IP): Performed by: CLINICAL NURSE SPECIALIST

## 2021-02-15 PROCEDURE — 6370000000 HC RX 637 (ALT 250 FOR IP): Performed by: PHYSICAL MEDICINE & REHABILITATION

## 2021-02-15 PROCEDURE — 1280000000 HC REHAB R&B

## 2021-02-15 PROCEDURE — 97110 THERAPEUTIC EXERCISES: CPT

## 2021-02-15 PROCEDURE — 94640 AIRWAY INHALATION TREATMENT: CPT

## 2021-02-15 RX ADMIN — PANTOPRAZOLE SODIUM 40 MG: 40 TABLET, DELAYED RELEASE ORAL at 06:09

## 2021-02-15 RX ADMIN — RIFAXIMIN 550 MG: 550 TABLET ORAL at 20:37

## 2021-02-15 RX ADMIN — IPRATROPIUM BROMIDE AND ALBUTEROL SULFATE 1 AMPULE: 2.5; .5 SOLUTION RESPIRATORY (INHALATION) at 07:55

## 2021-02-15 RX ADMIN — IPRATROPIUM BROMIDE AND ALBUTEROL SULFATE 1 AMPULE: 2.5; .5 SOLUTION RESPIRATORY (INHALATION) at 12:13

## 2021-02-15 RX ADMIN — METOPROLOL TARTRATE 25 MG: 25 TABLET, FILM COATED ORAL at 20:37

## 2021-02-15 RX ADMIN — APIXABAN 5 MG: 5 TABLET, FILM COATED ORAL at 10:04

## 2021-02-15 RX ADMIN — POLYVINYL ALCOHOL 1 DROP: 14 SOLUTION/ DROPS OPHTHALMIC at 16:23

## 2021-02-15 RX ADMIN — ACETAMINOPHEN 650 MG: 325 TABLET, FILM COATED ORAL at 10:07

## 2021-02-15 RX ADMIN — MINERAL OIL AND WHITE PETROLATUM: 150; 830 OINTMENT OPHTHALMIC at 20:37

## 2021-02-15 RX ADMIN — POLYVINYL ALCOHOL 1 DROP: 14 SOLUTION/ DROPS OPHTHALMIC at 20:37

## 2021-02-15 RX ADMIN — ATORVASTATIN CALCIUM 20 MG: 20 TABLET, FILM COATED ORAL at 20:37

## 2021-02-15 RX ADMIN — APIXABAN 5 MG: 5 TABLET, FILM COATED ORAL at 20:37

## 2021-02-15 RX ADMIN — ASPIRIN 81 MG: 81 TABLET, CHEWABLE ORAL at 10:04

## 2021-02-15 RX ADMIN — CHLORHEXIDINE GLUCONATE 0.12% ORAL RINSE 15 ML: 1.2 LIQUID ORAL at 10:07

## 2021-02-15 RX ADMIN — POLYVINYL ALCOHOL 1 DROP: 14 SOLUTION/ DROPS OPHTHALMIC at 10:05

## 2021-02-15 RX ADMIN — CHLORHEXIDINE GLUCONATE 0.12% ORAL RINSE 15 ML: 1.2 LIQUID ORAL at 20:37

## 2021-02-15 RX ADMIN — METOPROLOL TARTRATE 25 MG: 25 TABLET, FILM COATED ORAL at 10:05

## 2021-02-15 RX ADMIN — Medication: at 20:45

## 2021-02-15 RX ADMIN — POLYVINYL ALCOHOL 1 DROP: 14 SOLUTION/ DROPS OPHTHALMIC at 12:54

## 2021-02-15 RX ADMIN — GLYCOPYRROLATE 1 MG: 1 TABLET ORAL at 10:05

## 2021-02-15 RX ADMIN — IPRATROPIUM BROMIDE AND ALBUTEROL SULFATE 1 AMPULE: 2.5; .5 SOLUTION RESPIRATORY (INHALATION) at 20:03

## 2021-02-15 RX ADMIN — RIFAXIMIN 550 MG: 550 TABLET ORAL at 10:05

## 2021-02-15 RX ADMIN — Medication: at 10:06

## 2021-02-15 RX ADMIN — IPRATROPIUM BROMIDE AND ALBUTEROL SULFATE 1 AMPULE: 2.5; .5 SOLUTION RESPIRATORY (INHALATION) at 16:35

## 2021-02-15 ASSESSMENT — PAIN DESCRIPTION - ONSET: ONSET: ON-GOING

## 2021-02-15 ASSESSMENT — PAIN SCALES - GENERAL
PAINLEVEL_OUTOF10: 10
PAINLEVEL_OUTOF10: 0
PAINLEVEL_OUTOF10: 0

## 2021-02-15 ASSESSMENT — PAIN DESCRIPTION - ORIENTATION: ORIENTATION: RIGHT

## 2021-02-15 ASSESSMENT — PAIN DESCRIPTION - DESCRIPTORS: DESCRIPTORS: ACHING

## 2021-02-15 ASSESSMENT — PAIN DESCRIPTION - PAIN TYPE: TYPE: CHRONIC PAIN;ACUTE PAIN

## 2021-02-15 ASSESSMENT — PAIN DESCRIPTION - FREQUENCY: FREQUENCY: CONTINUOUS

## 2021-02-15 NOTE — PROGRESS NOTES
Physical Therapy  Weekly Team Note  Evaluating Therapist: Chantel Desai DPT    NAME: Amy Jaeger  ROOM: 4161V  DIAGNOSIS: Acute metabolic encephalopathy  PRECAUTIONS: Falls, PEG tube, trach (capped), L hemiparesis, LE proprioceptive deficits  HPI: 76year old male admitted to [facility] on 11/18/20 with respiratory failure secondary to witnessed aspiration event at home - chronic dysphagia with feeding tube. Intubated twice at [facility] with subsequent trach placement. Admitted to [facility] for vent weaning. Transferred to ARU 2/1. Social:  Pt lives with his son in a trailer with 8 steps with B rails or a ramp to enter. Prior to admission pt ambulated with [employer] Mod I, however this information was obtained from an old note in his chart. It is currently believed that the pt was not independent and rather required assistance with all activities. Initial Evaluation  2/3/21 2/8/21    2/15/21 Comments    Short Term Goals Long Term Goals    Bed Mobility  Rolling: SBA  Supine to sit: SBA  Sit to supine: SBA  Scooting: SBA Rolling: SBA  Supine to sit: SBA  Sit to supine: SBA  Scooting: SBA Supervision all aspects  Supervision Independent   Transfers Sit to stand: Max A x2  Stand to sit: Max A x2  Stand pivot: NT  Slideboard: Max A Sit to stand: Max A  Stand to sit: Max A  Stand pivot: NT  Slideboard: Max A Sit to stand: Max A  Stand to sit:  Max A  Stand pivot: NT  Slideboard: SBA/Min A (assist for slideboard placement and removal) Limited significantly by generalized debility and BLE weakness  Pt has made progress in slideboard transfers but does requires increased time and some assistance to complete Min A SBA   Ambulation   NT 5 feet x2 reps and 3 feet x1 rep in parallel bars with Mod/Max A + WC follow 10 feet x2 reps in parallel bars with Mod/Max A + WC follow Flexed posture, bent knees, shuffles feet  Ambulating more for therapeutic activity at this point 25 feet with [employer] with Min A >75 feet with [employer] with SBA   Wheel Chair Mobility 100 feet  feet Supervision 200 feet Supervision Decreased speed >200 feet Supervision >200 feet Independent   Car Transfers NT NT NT Unsafe at this time Min A SBA   Stair negotiation: ascended and descended NT NT NT Unsafe at this time 4 steps with B rail with Min A 4 steps with B rail with SBA   [facility] WFL      BLE Strength RLE 4/5  LLE 3+/5 RLE 4/5  LLE 3+/5 RLE 4/5  LLE 3+/5      Balance  Sitting: SBA   Static stand: Max A x2 Sitting: SBA   Static stand: Max A  Sitting: Supervision   Static stand: Max A       Date Family Teach Completed TBA TBA TBA      Is additional Family Teaching Needed? Y or N Y Y Y      Hindering Progress Debility, L hemiparesis Debility, L hemiparesis Debility, L hemiparesis      PT recommended ELOS 4-5 weeks  2 weeks      Team's Discharge Plan  Re-eval next week  2 weeks      Therapist at Team Meeting  Dieter Brito, PT, DPT [id number]   Dieter Brito PT, DPT [id number]          Date:  2/8/21  Supporting factors:  Verbally states numerous times that he wants to improve and work with therapy  Barriers to discharge:  Debility, generalized weakness  Additional comments:  Pt is significantly impaired functionally due to his global debility, fatigue, and weakness. Pt is well aware of his deficits and would like to improve upon them as much as he can. Pt will need max amount of time if he is to return home even if it is at a wheelchair level. DME:  TBD closer to discharge  After Care:  TBD closer to discharge  Roseline Sewell, DPT [id number]    Date:  2/15/21  Supporting factors:  Verbally states numerous times that he wants to improve and work with therapy  Barriers to discharge:  Debility, generalized weakness  Additional comments:  Pt is making progress in terms of functioning at a wheelchair level which is how the pt will more than likely discharge. Ambulation at this point is more for therapeutic purposes.  Family training should be completed ASAP to determine if pt's son can handle pt at this level.   DME: If home - wheelchair and slideboard  After Care: TBD closer to discharge  Shirley Woo DPT [id number]

## 2021-02-15 NOTE — PROGRESS NOTES
Physical Therapy  Treatment Note  Evaluating Therapist: Blanca Zuniga DPT    NAME: Pat Finch  ROOM: 0478B  DIAGNOSIS: Acute metabolic encephalopathy  PRECAUTIONS: Falls, PEG tube, trach (capped), L hemiparesis, LE proprioceptive deficits  HPI: 76year old male admitted to [facility] on 11/18/20 with respiratory failure secondary to witnessed aspiration event at home - chronic dysphagia with feeding tube. Intubated twice at [facility] with subsequent trach placement. Admitted to [facility] for vent weaning. Transferred to ARU 2/1. Social:  Pt lives with his son in a trailer with 8 steps with B rails or a ramp to enter. Prior to admission pt ambulated with [employer] Mod I. Initial Evaluation  2/3/21 AM     PM    Short Term Goals Long Term Goals    Was pt agreeable to Eval/treatment? yes yes yes     Does pt have pain? L wrist and R shoulder pain L wrist and B shoulder pain L wrist and B shoulder pain     Bed Mobility  Rolling: SBA  Supine to sit: SBA  Sit to supine: SBA  Scooting: SBA Supervision all aspects Supervision all aspects Supervision Independent   Transfers Sit to stand: Max A x2  Stand to sit: Max A x2  Stand pivot: NT  Slideboard: Max A Sit to stand: Max A  Stand to sit: Max A  Stand pivot: NT  Slideboard: SBA/Min A (assist for slideboard placement and removal) Sit to stand: Max A  Stand to sit:  Max A  Stand pivot: NT  Slideboard: SBA/Min A (assist for slideboard placement and removal) Min A SBA   Ambulation   NT 10 feet x2 reps in parallel bars with Mod/Max A + WC follow 10 feet x2 reps in parallel bars with Mod/Max A + WC follow 25 feet with [employer] with Min A >75 feet with WW with SBA   Walking 10 feet on uneven surface NT NT NT 10 feet with [employer] with Min A 10 feet with [employer] with SBA   Wheel Chair Mobility 100 feet  feet Supervision 200 feet Supervision >200 feet Supervision >200 feet Independent   Car Transfers NT NT NT Min A SBA   Stair negotiation: ascended and descended NT NT NT 4 steps with B rail with Min A 4 steps with B rail with SBA   Curb Step:   ascended and descended NT NT NT     Picking up object off the floor NT NT NT     BLE ROM [facility] WFL      BLE Strength RLE 4/5  LLE 3+/5 RLE 4/5  LLE 3+/5      Balance  Sitting: SBA   Static stand: Max A x2 Sitting: SBA   Static stand: Max A       Date Family Teach Completed TBA TBA      Is additional Family Teaching Needed? Y or N Y Y      Hindering Progress Debility, L hemiparesis Debility, L hemiparesis      PT recommended ELOS 4-5 weeks       Team's Discharge Plan        Therapist at Team Meeting          Therapeutic Exercise:   AM:   - Slideboard transfer wheelchair<>mat table  - Sit<> parallel bars with static stand hold for max time (90sec)  - Standing in-place LLE side step outs in parallel bars x10 reps (attempted to perform RLE side step outs however pt's knees buckled)  PM:  - Slideboard transfer wheelchair<>mat table  - Standing mini squats in parallel bars x10 reps to improve BLE strength and emphasize establishing TKE    Additional Comments: Pt continues to have significant functional deficits as a result of the pt's debility and impaired strength levels. Pt still requires heavy assistance to perform sit<>stand transfer and ambulation in parallel bars. Pt's ambulation is more for therapeutic benefit as he is not currently a functional ambulator due to the amount of assistance and set up required to ambulate pt. During ambulation pt is unable to clear his R foot off the ground therefor he slides it across the floor in order to advance it. Pt continues to improve and sharpen his slideboard transfer skills, however he still does need some assistance from PT to manage the board. Future PT sessions will continue to focus on improving the pt's slideboard transfers, wheelchair independence, and ambulation for therapeutic purposes.     Patient/family education  Pt/family educated on standing posture, ambulation mechanics in parallel bars, slideboard transfer

## 2021-02-15 NOTE — PROGRESS NOTES
OCCUPATIONAL THERAPY DAILY NOTE    Date:2/15/2021  Patient Name: Freida Schmidt  MRN: [id number]  : 1945  Room: [address] [place], [place] [address]     Diagnosis: RF secondary to aspiration @ home, trach & peg  Additional Pertinent Hx: CHF, COPD, hx CVA, dysphagia, peg heart disease  Precautions: Falls, NPO, Peg    Functional Assessment:   Date Status AE  Comments   Feeding 21 NPO     Grooming 21 SBA seated          Oral Care 21 SBA/S     Bathing 21 Mod A Seated    UB Dressing 21 SBA     LB Dressing 21 Max A     Footwear 21 Mod A      Toileting 2/3/21 Max A Bed level     Homemaking 2/10/21 SBA Laundry      Functional Transfers / Balance:   Date Status DME  Comments   Sit Balance 2/15/21 SBA  Unsupported sitting on commode and in w/c. Stand Balance 21 Mod A x2     [] Tub  [] Shower   Transfer  TBA-  Pt sponge bathed at EOB PTA. Commode   Transfer 2/15/21 Max/Mod A Bariatric 3-in-1, transfer board Pt required Max A for transfer from w/c>3-in-1 d/t difficulty initiating transfer. Mod A required for transfer from 3-in-1 to w/c for safety. Pt required cues for forward posture d/t pt leaning backward secondary to safety concerns. Functional   Mobility 2/15/21 SBA/S W/c Completed household distances utilizing B LE's with min cues for safety required. Other:  Bed>W/c      Supine to EOB   21   Min/mod A      SBA   Transfer board    Bed rail        Functional Exercises / Activity:  -1# weight with R UE and 2# with L UE exercises completed 3x10 reps in all planes to tolerance with focus on increasing B UE strength and overall endurance for functional activities within tolerance. Fair tolerance.   -Nut/bolt ladder completed seated at table with focus on increasing B UE AROM/endurance and [address] with fair tolerance. Increased time required to complete.   -Peg board activity completed on incline while seated at table with focus on increasing core strength/trunk control and B UE AROM. Completed with fair+ tolerance. -Medium resistance putty activities completed seated at table with focus on increasing B UE hand/ strength and [address]. Fair tolerance. Sensory / Neuromuscular Re-Education:      Cognitive Skills:   Status Comments   Problem   Solving fair  Demo during therapeutic activities. Memory fair  \"   Sequencing fair  \"   Safety fair - Demo during sliding board transfers from bed into w/c. Visual Perception:    Education:  Pt educated on body mechanics/posture during transfer to commode utilizing transfer board. [] Family teach completed on:    Pain Level: No c/o pain. Additional Notes:    2/15/21: Dynamometer:R: 34        L: 10       9 [address] Peg: R: 49 Sec         L: 35 Sec     Patient has made fair  progress during treatment sessions toward set goals.  Therapy emphasis to obtain goals:  Current Treatment Recommendations: Strengthening, Gait Training, Patient/Caregiver Education & Training, Home Management Training, ROM, Equipment Evaluation, Education, & procurement, Balance Training, Functional Mobility Training, Endurance Training, Wheelchair Mobility Training, Safety Education & Training, Self-Care / ADL      Long term goal 2: Pt demo Mod I grooiming seated  Long term goal 3: Pt demo Min A bathing @ sink level or shower level seated  Long term goal 4: Pt demo s/u UE & Min A LE dress to don depends, pants, socks & shoes using AE as needed  Long term goal 5: Pt demo Min-Mod  A commode trf wtih appropriate DME to ensure pt safety  Long term goals 6: Pt demo Min-Mod A tub trf with appropriate DME to ensure pt safety  Long term goal 7: Pt demo Sup light homemaking activity @ wc level  Long term goal 8: Pt demo G- endurance for a 20 minute funcitonal activity  Long term goal 9: Pt demo increase BUE strength to 4+/5 to improve pt ability to complete ADLs  Long term goal 10: Pt demo Mod I wc propulsion throughout a living environment & around obstacles    [x] Continue with current OT Plan of care.   [] Prepare for Discharge     DISCHARGE RECOMMENDATIONS  Recommended DME: transfer board, bariatric 3-in-1    Post Discharge Care:   []Home Independently  [x]Home with 24hr Care / Supervision []Home with Partial Supervision []Home with Home Health OT []Home with Out Pt OT []Other: ___   Comments:         Time in Time out Tx Time Breakdown  Variance:   First Session  0830 0915 [x] Individual  tx -45 Mins  [] Concurrent Tx-   [] Co-Tx -   [] Group Tx -   [] Time Missed -     Second Session 1300 1345 [x] Individual Tx-45 Mins    [] Concurrent Tx -  [] Co-Tx -   [] Group Tx -   [] Time Missed -     Third Session    [] Individual Tx-   [] Concurrent Tx -  [] Co-Tx -   [] Group Tx -   [] Time Missed -         Total Tx Time:90 Mins      Precious ABDULLAHI/ROJELIO [id number]

## 2021-02-15 NOTE — PROGRESS NOTES
Comprehensive Nutrition Assessment    Type and Reason for Visit:  Reassess    Nutrition Recommendations/Plan: Continue current bolus feedings, Add 2 protein modulars daily to meet est nutrient needs  Current TF + 2 pro mods will provide 1200 ml tv, 2000 kcals, 129 gm pro, 912 ml free water  Regimen will meet 100% est nutrient needs    Nutrition Assessment:  Pt remains nutritionally compromised w/ severe malnutrition, h/o CVA w/ dysphagia, trach w/ ongoing TF via PEG. Will provide updated TF recs and continue to monitor. Malnutrition Assessment:  Malnutrition Status:  Severe malnutrition    Context:  Chronic Illness     Findings of the 6 clinical characteristics of malnutrition:  Energy Intake:  7 - 75% or less estimated energy requirements for 1 month or longer  Weight Loss:  7 - Greater than 7.5% over 3 months(15.5% wt loss x 3 months)     Body Fat Loss:  Unable to assess     Muscle Mass Loss:  Unable to assess    Fluid Accumulation:  No significant fluid accumulation     Strength:  Not Performed    Estimated Daily Nutrient Needs:  Energy (kcal):  ; Weight Used for Energy Requirements:  Current     Protein (g):  115-130; Weight Used for Protein Requirements:  Ideal(1.3-1.5)        Fluid (ml/day):  ; Method Used for Fluid Requirements:  1 ml/kcal      Nutrition Related Findings:  pt alert, active BS, soft abd, trace edema, PEG w/ TF, +I/Os      Wounds:  Pressure Injury, Stage II       Current Nutrition Therapies:    DIET TUBE FEEDING BOLUS NPO; 1.5 Calorie with Fiber; Gastrostomy; 240  Current Tube Feeding (TF) Orders:  · Feeding Route: PEG  · Formula: 1.5 Calorie with Fiber  · Schedule:  Bolus  · Current TF & Flush Orders Provides: 240 ml bolus 5x/day; 1200 ml tv, 1800 kcals, 77 gm pro, 912 ml free water    Anthropometric Measures:  · Height: 6' 3\" (190.5 cm)  · Current Body Weight: 212 lb (96.2 kg)(2/2)   · Usual Body Weight: 250 lb (113.4 kg)(11/2020 actual per EMR)     · Ideal Body Weight: 196 lbs; % Ideal Body Weight 108.2 %   · BMI: 26.5  · BMI Categories: Overweight (BMI 25.0-29. 9)       Nutrition Diagnosis:   · Inadequate oral intake related to cognitive or neurological impairment as evidenced by NPO or clear liquid status due to medical condition, nutrition support - enteral nutrition    Nutrition Interventions:   Food and/or Nutrient Delivery:  Continue NPO, Continue Current Tube Feeding(Add 2 protein modular daily)  Nutrition Education/Counseling:  Education not indicated   Coordination of Nutrition Care:  Continue to monitor while inpatient    Goals:   Tolerance to EN       Nutrition Monitoring and Evaluation:   Food/Nutrient Intake Outcomes:  Enteral Nutrition Intake/Tolerance  Physical Signs/Symptoms Outcomes:  Biochemical Data, Chewing or Swallowing, GI Status, Fluid Status or Edema, Nutrition Focused Physical Findings, Skin, Weight     Discharge Planning:    Enteral Nutrition     Electronically signed by Addi Love, MS, RD, LD on 2/15/21 at 10:34 AM EST    Contact: [phone]

## 2021-02-15 NOTE — PROGRESS NOTES
Sergio Hernandez M.D.,[facility]  Amanda Pinon D.O., F.A.C.O.I., Maryjane Richards M.D. Mo Morgan M.D., Norberto Alatorre M.D. Rajan Brooks D.O. Daily Pulmonary Progress Note    Patient:  Dayana Rose 68 y.o. male MRN: [id number]     Date of Service: 2/15/2021      Synopsis     We are following patient for respiratory failure    \"CC\" cough    Code status: Full      Subjective      Patient seen and examined in speech therapy today. His trach is capped and he is phonating well. He has an occasional cough with white sputum. Lung sounds are clear. Review of Systems:  Constitutional: Denies fever, weight loss, night sweats, and fatigue  Skin: Denies pigmentation, dark lesions, and rashes   HEENT: Denies hearing loss, tinnitus, ear drainage, epistaxis, sore throat, and hoarseness. Cardiovascular: Denies palpitations, chest pain, and chest pressure. Respiratory: + cough, dyspnea at rest, hemoptysis, apnea, and choking.   Gastrointestinal: Denies nausea, vomiting, poor appetite, diarrhea, heartburn or reflux  Genitourinary: Denies dysuria, frequency, urgency or hematuria  Musculoskeletal: Denies myalgias, muscle weakness, and bone pain  Neurological: Denies dizziness, vertigo, headache, and focal weakness  Psychological: Denies anxiety and depression  Endocrine: Denies heat intolerance and cold intolerance  Hematopoietic/Lymphatic: Denies bleeding problems and blood transfusions    24-hour events:  None    Objective   Vitals: BP (!) 146/85   Pulse 80   Temp 97.9 °F (36.6 °C) (Oral)   Resp 18   Ht 6' 3\" (1.905 m)   Wt 212 lb 8 oz (96.4 kg)   SpO2 96%   BMI 26.56 kg/m²     I/O:    Intake/Output Summary (Last 24 hours) at 2/15/2021 1623  Last data filed at 2/15/2021 1127  Gross per 24 hour   Intake 1200 ml   Output 375 ml   Net 825 ml       Vent Information  SpO2: 96 %                CURRENT MEDS :  Scheduled Meds:   mineral oil-hydrophil petrolat   Topical BID    glycopyrrolate 1 mg PEG Tube Daily    lubrifresh P.M. Left Eye Nightly    polyvinyl alcohol  1 drop Both Eyes 4x Daily    albuterol  2.5 mg Nebulization BID    apixaban  5 mg Per G Tube BID    aspirin  81 mg PEG Tube Daily    atorvastatin  20 mg Oral Nightly    chlorhexidine  15 mL Mouth/Throat BID    ipratropium-albuterol  1 ampule Inhalation Q4H WA    metoprolol tartrate  25 mg Oral BID    pantoprazole  40 mg Oral QAM AC    polyethylene glycol  17 g Per G Tube Daily    rifaximin  550 mg PEG Tube BID       Physical Exam:  General Appearance: appears comfortable in no acute distress. HEENT: Normocephalic atraumatic without obvious abnormality   Neck: Lips, mucosa, and tongue normal.  Supple, symmetrical, trachea midline, no adenopathy;thyroid:  no enlargement/tenderness/nodules or JVD. Lung: Breath sounds CTA. Respirations   unlabored. Symmetrical expansion. Heart: RRR, normal S1, S2. No MRG  Abdomen: Soft, NT, ND. BS present x 4 quadrants. No bruit or organomegaly. Extremities: Pedal pulses 2+ symmetric b/l. Extremities normal, no cyanosis, clubbing, or edema. Musculokeletal: No joint swelling, no muscle tenderness. ROM normal in all joints of extremities. Neurologic: Mental status: Alert and Oriented X3 .     Pertinent/ New Labs and Imaging Studies     Imaging Personally Reviewed:  2/1/2021    ECHO  None on file    Labs:  Lab Results   Component Value Date    WBC 3.5 02/03/2021    HGB 13.6 02/03/2021    HCT 41.5 02/03/2021    MCV 84.7 02/03/2021    MCH 27.8 02/03/2021    MCHC 32.8 02/03/2021    RDW 15.8 02/03/2021     02/03/2021    MPV 11.8 02/03/2021     Lab Results   Component Value Date     02/09/2021    K 4.8 02/09/2021    K 4.4 02/03/2021     02/09/2021    CO2 25 02/09/2021    BUN 25 02/09/2021    CREATININE 0.7 02/09/2021    LABALBU 3.3 02/03/2021    CALCIUM 9.3 02/09/2021    GFRAA >60 02/09/2021    LABGLOM >60 02/09/2021     Lab Results   Component Value Date    PROTIME 19.7 12/03/2020    INR 1.7 12/03/2020     No results for input(s): PROBNP in the last 72 hours. No results for input(s): PROCAL in the last 72 hours. This SmartLink has not been configured with any valid records. Assessment:    1. Acute respiratory failure with hypoxia and hypercapnia-resolved  2. Hemoptysis post trach change 1/27/2021-resolved  3. Off vent since 1/6/2021  4. Aspiration pneumonia completed [place]. History of witnessed aspiration event at home  5. Leukopenia  6. Tracheostomy 1113 from outside hospital, downsized to 1/27/2020 1/26 Bina cuffless, this is second time he has had a trach was trach after his CVA   7. Bilateral pleural effusions with compressive atelectasis status post thoracentesis 1.5 L fluid removed transudate on 12/2/20  8. History of CVA  9. Probable LEONARDO  10. COPD  6. CAD status post stents PCI 2018  12. A. fib on Eliquis  13. BPH  14. Cirrhosis  15. Bronchoscopy from outside hospital on 11/6/2020 with evidence of tracheobronchomalacia  16. Previous ABGs  chronic hypercapnia 1/18/2021      Plan:   1. Full trach capping monitor off oxygen keep greater than 92%.   2. 1/29 bronchoscopy for airway examination no obvious source of bleeding identified  3. Follow chest x-ray   4. Previous ABGs with chronic hypercapnia 1/18/2021  5. Completed tobramycin aerosols and cefepime for Pseudomonas tracheobronchitis  6.  baby aspirin and Eliquis resumed on 2/2/2021  7. Bronchodilator regimen albuterol twice daily, saline nebs 3% as needed. Robinul twice daily for secretions. Decrease to daily   8. Speech therapy-barium swallow 2/4/2021 aspiration, recommendation n.p.o., continue feeding with PEG  9. PT OT  10. Nutritional support tube feeds via PEG tube  11. Bowel regimen  12.  GI prophylaxis    Considering patient's history of trach x2, continued dysphagia, aspiration history and difficulty clearing secretions, multiple lung infections, failed swallow study and hypercapnia would not recommend decannulation at this time. This plan of care was reviewed in collaboration with Dr. Chucho Shen  Electronically signed by PAYTON Betancourt CNP on 2/15/2021 at 4:23 PM      I personally saw, examined, and cared for the patient. Labs, medications, radiographs reviewed. I agree with history exam and plans detailed in NP note.   Conor Barraza MD

## 2021-02-15 NOTE — PROGRESS NOTES
the last 72 hours. APTT:No results for input(s): APTT in the last 72 hours. LIVER PROFILE:No results for input(s): AST, ALT, BILIDIR, BILITOT, ALKPHOS in the last 72 hours. Imaging Last 24 Hours:  No results found. Assessment//Plan           Hospital Problems           Last Modified POA    Acute metabolic encephalopathy 7/4/3705 Yes    Severe protein-calorie malnutrition (HCC) (Chronic) 2/4/2021 Yes          Initial Evaluation  2/3/21 AM     Short Term Goals Long Term Goals    Was pt agreeable to Eval/treatment? yes yes       Does pt have pain? L wrist and R shoulder pain L wrist and B shoulder pain       Bed Mobility  Rolling: SBA  Supine to sit: SBA  Sit to supine: SBA  Scooting: SBA Hospital bed   Rolling SBA  Supine to sit SBA    Supervision Independent   Transfers Sit to stand: Max A x2  Stand to sit: Max A x2  Stand pivot: NT  Slideboard: Max A Sit to stand: Max A  Stand to sit: Max A  Slide board from hospital bed to wc with min A  Min A SBA   Ambulation   NT 8 feet x2 in parallel bars with mod/max A  ( wc follow)  25 feet with [employer] with Min A >75 feet with [employer] with SBA   Walking 10 feet on uneven surface NT NT 10 feet with [employer] with Min A 10 feet with [employer] with SBA   Wheel Chair Mobility 100 feet  feet x2 with B UE/LEs with supervision  >200 feet Supervision >200 feet Independent   Car Transfers NT NT Min A SBA   Stair negotiation: ascended and descended NT NT 4 steps with B rail with Min A 4 steps with B rail with SBA   Curb Step:   ascended and descended NT NT       Picking up object off the floor NT NT       BLE ROM WFL WFL       BLE Strength RLE 4/5  LLE 3+/5 RLE 4/5  LLE 3+/5       Balance  Sitting: SBA   Static stand: Max A x2 Sitting: SBA   Static stand: Max A        Date Family Teach Completed TBA TBA       Is additional Family Teaching Needed?   Y or N Y Y       Hindering Progress Debility, L hemiparesis Debility, L hemiparesis       PT recommended ELOS 4-5 weeks         Team's Discharge Plan           Therapist at Team Meeting              Therapeutic Exercise:   AM:   Sit <>Stand x5 reps in parallel bars and holding standing position to increased standing tolerance.      Additional Comments: Pt continues to have significant functional deficits as a result of the pt's debility and impaired strength levels. Pt still requires heavy assistance to perform sit<>stand transfer and ambulation in parallel bars. Pt ambulates in parallel bars with B flexed knees and a forward flexed posture. Increased assistance noted with slide board transfer from hospital bed to .       Patient/family education  Pt/family educated on standing posture, ambulation mechanics in parallel bars, slideboard transfer technique/safety     Patient/family response to education:   Pt/family verbalized understanding Pt/family demonstrated skill Pt/family requires further education in this area   x X with verbal cues and assistance  x      Time in 0930  Time out 1000  Pt is making fair progress toward established Physical Therapy goals. Continue with physical therapy current plan of care. Senia Villalobos [id number]                     Cosigned by: Chantel Desai PT at 2/15/2021  7:59 AM   Revision History                      Assessment:    Condition: In stable condition. Improving. (Metabolic encephalopathy). Plan:   Encourage ambulation. (Still max assist transfers  Ambulating 8 feet in the parallel bars  Still n.p.o. with aspiration  He is tolerating trach plugging well  If he improves his physical functioning I may ask pulmonary to reconsider decannulated).        Electronically signed by Dhruv Gil MD on 2/15/21 at 8:01 AM EST

## 2021-02-16 PROCEDURE — 1280000000 HC REHAB R&B

## 2021-02-16 PROCEDURE — 97530 THERAPEUTIC ACTIVITIES: CPT

## 2021-02-16 PROCEDURE — 6370000000 HC RX 637 (ALT 250 FOR IP): Performed by: PHYSICAL MEDICINE & REHABILITATION

## 2021-02-16 PROCEDURE — 94640 AIRWAY INHALATION TREATMENT: CPT

## 2021-02-16 PROCEDURE — 97535 SELF CARE MNGMENT TRAINING: CPT

## 2021-02-16 PROCEDURE — 97130 THER IVNTJ EA ADDL 15 MIN: CPT

## 2021-02-16 PROCEDURE — 6370000000 HC RX 637 (ALT 250 FOR IP): Performed by: CLINICAL NURSE SPECIALIST

## 2021-02-16 PROCEDURE — 97129 THER IVNTJ 1ST 15 MIN: CPT

## 2021-02-16 PROCEDURE — 97110 THERAPEUTIC EXERCISES: CPT

## 2021-02-16 RX ADMIN — ACETAMINOPHEN 650 MG: 325 TABLET ORAL at 10:24

## 2021-02-16 RX ADMIN — RIFAXIMIN 550 MG: 550 TABLET ORAL at 21:36

## 2021-02-16 RX ADMIN — APIXABAN 5 MG: 5 TABLET, FILM COATED ORAL at 21:36

## 2021-02-16 RX ADMIN — IPRATROPIUM BROMIDE AND ALBUTEROL SULFATE 1 AMPULE: 2.5; .5 SOLUTION RESPIRATORY (INHALATION) at 20:50

## 2021-02-16 RX ADMIN — IPRATROPIUM BROMIDE AND ALBUTEROL SULFATE 1 AMPULE: 2.5; .5 SOLUTION RESPIRATORY (INHALATION) at 17:25

## 2021-02-16 RX ADMIN — IPRATROPIUM BROMIDE AND ALBUTEROL SULFATE 1 AMPULE: 2.5; .5 SOLUTION RESPIRATORY (INHALATION) at 08:10

## 2021-02-16 RX ADMIN — PANTOPRAZOLE SODIUM 40 MG: 40 TABLET, DELAYED RELEASE ORAL at 06:25

## 2021-02-16 RX ADMIN — POLYVINYL ALCOHOL 1 DROP: 14 SOLUTION/ DROPS OPHTHALMIC at 21:38

## 2021-02-16 RX ADMIN — APIXABAN 5 MG: 5 TABLET, FILM COATED ORAL at 10:19

## 2021-02-16 RX ADMIN — RIFAXIMIN 550 MG: 550 TABLET ORAL at 10:19

## 2021-02-16 RX ADMIN — Medication: at 21:38

## 2021-02-16 RX ADMIN — METOPROLOL TARTRATE 25 MG: 25 TABLET, FILM COATED ORAL at 21:36

## 2021-02-16 RX ADMIN — POLYVINYL ALCOHOL 1 DROP: 14 SOLUTION/ DROPS OPHTHALMIC at 16:27

## 2021-02-16 RX ADMIN — MINERAL OIL AND WHITE PETROLATUM: 150; 830 OINTMENT OPHTHALMIC at 21:38

## 2021-02-16 RX ADMIN — Medication: at 10:27

## 2021-02-16 RX ADMIN — ATORVASTATIN CALCIUM 20 MG: 20 TABLET, FILM COATED ORAL at 21:37

## 2021-02-16 RX ADMIN — POLYVINYL ALCOHOL 1 DROP: 14 SOLUTION/ DROPS OPHTHALMIC at 10:20

## 2021-02-16 RX ADMIN — CHLORHEXIDINE GLUCONATE 0.12% ORAL RINSE 15 ML: 1.2 LIQUID ORAL at 21:37

## 2021-02-16 RX ADMIN — CHLORHEXIDINE GLUCONATE 0.12% ORAL RINSE 15 ML: 1.2 LIQUID ORAL at 10:18

## 2021-02-16 RX ADMIN — IPRATROPIUM BROMIDE AND ALBUTEROL SULFATE 1 AMPULE: 2.5; .5 SOLUTION RESPIRATORY (INHALATION) at 12:17

## 2021-02-16 RX ADMIN — GLYCOPYRROLATE 1 MG: 1 TABLET ORAL at 10:18

## 2021-02-16 RX ADMIN — ASPIRIN 81 MG: 81 TABLET, CHEWABLE ORAL at 10:18

## 2021-02-16 RX ADMIN — POLYVINYL ALCOHOL 1 DROP: 14 SOLUTION/ DROPS OPHTHALMIC at 14:44

## 2021-02-16 RX ADMIN — METOPROLOL TARTRATE 25 MG: 25 TABLET, FILM COATED ORAL at 10:25

## 2021-02-16 ASSESSMENT — PAIN SCALES - GENERAL
PAINLEVEL_OUTOF10: 0
PAINLEVEL_OUTOF10: 6
PAINLEVEL_OUTOF10: 1

## 2021-02-16 NOTE — PROGRESS NOTES
OCCUPATIONAL THERAPY DAILY NOTE    Date:2021  Patient Name: Renee Motley  MRN: [id number]  : 1945  Room: [address] [place], [place] [address]     Diagnosis: RF secondary to aspiration @ home, trach & peg  Additional Pertinent Hx: CHF, COPD, hx CVA, dysphagia, peg heart disease  Precautions: Falls, NPO, Peg    Functional Assessment:   Date Status AE  Comments   Feeding 21 NPO     Grooming 21 SBA seated          Oral Care 21 SBA/S     Bathing 21 Mod A Seated    UB Dressing 21 SBA     LB Dressing 21 Max A     Footwear 21 Mod A      Toileting 2/3/21 Max A Bed level     Homemaking 2/10/21 SBA Laundry      Functional Transfers / Balance:   Date Status DME  Comments   Sit Balance 2/15/21 SBA  Unsupported sitting on commode and in w/c. Stand Balance 21 Mod A x2     [] Tub  [] Shower   Transfer  TBA-  Pt sponge bathed at EOB PTA. Commode   Transfer 2/15/21 Max/Mod A Bariatric 3-in-1, transfer board Pt required Max A for transfer from w/c>3-in-1 d/t difficulty initiating transfer. Mod A required for transfer from 3-in-1 to w/c for safety. Pt required cues for forward posture d/t pt leaning backward secondary to safety concerns. Functional   Mobility 2/15/21 SBA/S W/c Completed household distances utilizing B LE's with min cues for safety required. Other:  Bed>W/c      Supine to EOB   21   Min/mod A      SBA   Transfer board    Bed rail        Functional Exercises / Activity:  -1# weight with R UE and 2# with L UE exercises completed 3x10 reps in all planes to tolerance with focus on increasing B UE strength and overall endurance for functional activities within tolerance. Fair tolerance.   -Nut/bolt ladder completed seated at table with focus on increasing B UE AROM/endurance and [facility] with fair tolerance. Increased time required to complete.   -Peg board activity completed on incline while seated at table with focus on increasing core strength/trunk control and B UE AROM. Completed with fair+ tolerance. -Medium resistance putty activities completed seated at table with focus on increasing B UE hand/ strength and [address]. Fair tolerance. Sensory / Neuromuscular Re-Education:      Cognitive Skills:   Status Comments   Problem   Solving fair  Demo during therapeutic activities. Memory fair  \"   Sequencing fair  \"   Safety fair - Demo during sliding board transfers from bed into w/c. Visual Perception:    Education:  Pt educated on body mechanics/posture during transfer to commode utilizing transfer board. [] Family teach completed on:    Pain Level: No c/o pain. Additional Notes:    2/15/21: Dynamometer:R: 34        L: 10       9 Hole Peg: R: 49 Sec         L: 35 Sec     Patient has made fair  progress during treatment sessions toward set goals.  Therapy emphasis to obtain goals:  Current Treatment Recommendations: Strengthening, Gait Training, Patient/Caregiver Education & Training, Home Management Training, ROM, Equipment Evaluation, Education, & procurement, Balance Training, Functional Mobility Training, Endurance Training, Wheelchair Mobility Training, Safety Education & Training, Self-Care / ADL      Long term goal 2: Pt demo Mod I grooiming seated  Long term goal 3: Pt demo Min A bathing @ sink level or shower level seated  Long term goal 4: Pt demo s/u UE & Min A LE dress to don depends, pants, socks & shoes using AE as needed  Long term goal 5: Pt demo Min-Mod  A commode trf wtih appropriate DME to ensure pt safety  Long term goals 6: Pt demo Min-Mod A tub trf with appropriate DME to ensure pt safety  Long term goal 7: Pt demo Sup light homemaking activity @ wc level  Long term goal 8: Pt demo G- endurance for a 20 minute funcitonal activity  Long term goal 9: Pt demo increase BUE strength to 4+/5 to improve pt ability to complete ADLs  Long term goal 10: Pt demo Mod I wc propulsion throughout a living environment & around obstacles    2/16/21: Pt plan of care updated on this date. Family teach scheduled for 2/19/21. Pt tentative length of stay is 2 weeks. [x] Continue with current OT Plan of care.   [] Prepare for Discharge     DISCHARGE RECOMMENDATIONS  Recommended DME: transfer board, bariatric 3-in-1    Post Discharge Care:   []Home Independently  [x]Home with 24hr Care / Supervision []Home with Partial Supervision []Home with Home Health OT []Home with Out Pt OT []Other: ___   Comments:         Time in Time out Tx Time Breakdown  Variance:   First Session  0830 0915 [x] Individual  tx -45 Mins  [] Concurrent Tx-   [] Co-Tx -   [] Group Tx -   [] Time Missed -     Second Session 1300 1345 [x] Individual Tx-45 Mins    [] Concurrent Tx -  [] Co-Tx -   [] Group Tx -   [] Time Missed -     Third Session    [] Individual Tx-   [] Concurrent Tx -  [] Co-Tx -   [] Group Tx -   [] Time Missed -         Total Tx Time:90 Mins      Mathew ABDULLAHI/ROJELIO [id number]

## 2021-02-16 NOTE — PROGRESS NOTES
Physical Therapy  Treatment Note  Evaluating Therapist: Blanca Zuniga DPT    NAME: Jeanie Luz  ROOM: 3572R  DIAGNOSIS: Acute metabolic encephalopathy  PRECAUTIONS: Falls, PEG tube, trach (capped), L hemiparesis, LE proprioceptive deficits  HPI: 76year old male admitted to [facility] on 11/18/20 with respiratory failure secondary to witnessed aspiration event at home - chronic dysphagia with feeding tube. Intubated twice at [facility] with subsequent trach placement. Admitted to [facility] for vent weaning. Transferred to ARU 2/1. Social:  Pt lives with his son in a trailer with 8 steps with B rails or a ramp to enter. Prior to admission pt ambulated with [employer] Mod I. Initial Evaluation  2/3/21 AM     PM    Short Term Goals Long Term Goals    Was pt agreeable to Eval/treatment? yes yes yes     Does pt have pain? L wrist and R shoulder pain L wrist and B shoulder pain L wrist and B shoulder pain     Bed Mobility  Rolling: SBA  Supine to sit: SBA  Sit to supine: SBA  Scooting: SBA Supervision all aspects Supervision all aspects Supervision Independent   Transfers Sit to stand: Max A x2  Stand to sit: Max A x2  Stand pivot: NT  Slideboard: Max A Sit to stand: Max A  Stand to sit: Max A  Stand pivot: NT  Slideboard: SBA/Min A (assist for slideboard placement and removal) Sit to stand: Max A  Stand to sit:  Max A  Stand pivot: NT  Slideboard: SBA/Min A (assist for slideboard placement and removal) Min A SBA   Ambulation   NT 10 feet x2 reps in parallel bars with Mod/Max A + WC follow 10 feet in parallel bars with Mod A + WC follow (attempted backwards walking at the end of forward ambulation however pt unable to advance his legs backwards) 25 feet with [employer] with Min A >75 feet with WW with SBA   Walking 10 feet on uneven surface NT NT NT 10 feet with [employer] with Min A 10 feet with WW with SBA   Wheel Chair Mobility 100 feet  feet Supervision 200 feet Supervision >200 feet Supervision >200 feet Independent   Car Transfers NT NT NT Min A SBA   Stair negotiation: ascended and descended NT NT NT 4 steps with B rail with Min A 4 steps with B rail with SBA   Curb Step:   ascended and descended NT NT NT     Picking up object off the floor NT NT NT     BLE ROM WFL WFL      BLE Strength RLE 4/5  LLE 3+/5 RLE 4/5  LLE 3+/5      Balance  Sitting: SBA   Static stand: Max A x2 Sitting: Supervision  Static stand: Max A       Date Family Teach Completed TBA TBA      Is additional Family Teaching Needed? Y or N Y Y      Hindering Progress Debility, L hemiparesis Debility, L hemiparesis      PT recommended ELOS 4-5 weeks       Team's Discharge Plan        Therapist at Team Meeting          Therapeutic Exercise:   AM:   - Slideboard transfer wheelchair<>mat table  - Standing mini squats in parallel bars x15 reps to improve BLE strength and emphasize establishing TKE  PM:  - Slideboard transfer wheelchair<>mat table  - Standing mini squats in parallel bars x15 reps to improve BLE strength and emphasize establishing TKE  - Standing side step outs x10 reps/side with knee block provided by PT to improve pt's general strength and function as well as emphasize single leg stance and stability  - Reverse wheelchair push with BLE x300 feet to improve BLE strength    Additional Comments: Pt continues to have significant functional deficits as a result of the pt's debility and impaired strength levels. Pt still requires heavy assistance to perform sit<>stand transfer and ambulation in parallel bars. Continuing to practice slideboard transfers as this is the primary means of how the pt should transfer at this time due to his impaired function in standing. Continuing to ambulate in parallel bars for therapeutic benefits, however the pt at this time is not a functional ambulator. Will continue to improve the pt's ability to function at a wheelchair level as well as progress pt in ambulation.     Patient/family education  Pt/family educated on standing posture, ambulation mechanics in parallel bars, slideboard transfer technique/safety    Patient/family response to education:   Pt/family verbalized understanding Pt/family demonstrated skill Pt/family requires further education in this area   yes With cues/assist yes     AM  Time in: 0915  Time out: [id number]  PM   Time in: 1515  Time out: 1600    Pt is making fair progress toward established Physical Therapy goals. Continue with physical therapy current plan of care.     Kaylin Babcock, DPT [id number]

## 2021-02-16 NOTE — PATIENT CARE CONFERENCE
[address] [place], [place] [address] [facility] ACUTE REHABILITATION  TEAM CONFERENCE NOTE/PATIENT PLAN OF CARE    Date: 2021  Admission date: 2021  Patient Name: Koko Villavicencio        MRN: [id number]    : 1945  (77 y.o.)  Gender: male   Rehab diagnosis/surgery with date:  anoxic encephalopathy  Impairment Group Code:  2.1      MEDICAL/FUNCTIONAL HISTORY/STATUS:  multiple facial cuts due to attempting to shave on his own    Consultations/Labs/X-rays: pulmonary following for trach management-will probably go home with trach      MEDICATION UPDATE:  on Eliquis      NURSING :    Bowel:   Always Continent  []   Occasionally incontinent  [x]   Frequently incontinent  []   Always incontinent  []   Not occurred  []     Bladder:   Always Continent  []    Incontinent less than daily[x]   Incontinent  daily []   Always incontinent  []   No urine output    []   Indwelling catheter []       Toilet Hygiene:   Current level : dependent  Short term Toilet hygiene goal: max assist  Long term toilet hygiene goal:  min assist-mod assist    Skin integrity: facial cuts, stage 2 right buttocks new  Pain: none    NUTRITION    Diet  remains NPO, getting bolus feedings of 1.5 ramón with fiber, 5 times daily via PEG  Liquid consistency   NA    SOCIAL INFORMATION:  Lives with: son  Prior community services:  none  Home Architecture:  mobilie home, 2 entry, rail and ramp  Prior Level of function:  assistance with all-was on PEG feedings at home prior to admission  DME:  wheelchair , wheeled walker    FAMILY / PATIENT EDUCATION:  family ed Friday am with son    PHYSICAL THERAPY    Bed mobility:   Current level: supervision  Short term bed mobility goal: supervision  Long term bed mobility goal: Modified independent    Chair/bed transfers:  Current level: max assist, hindered by weakness, doing transfer boards  at standby assist-min assist  Short term Chair/bed transfers goal: min assist  Long term Chair/bed transfers goal: standby assist      Ambulation:   Current level: 10 ft parallel bars  at mod assist-max assist wheelchair  follow, flexed posture  Short term ambulation goal: 25 ft. min assist with a wheeled walker  Long term ambulation goal: 25 ft at min assist    Wheelchair Mobility:  Current level: 200 ft at supervision  Short term wheelchair goal: met  Long term wheelchair goal: 200 ft at Modified independent      Car transfers:   Current level: to be assessed    Stairs:   Current level : to be assessed    Lower Extremity Strength Issues:  right lower 4/5, left lower is 3+/5    Other comments: balance is supervision at edge of bed, max assist for standing      OCCUPATIONAL THERAPY:      Tub/shower:   Current level: NA    Grooming:   Current level: min assist-supervision  Short term grooming goal: supervision  Long term grooming goal: supervision    Bathing:  Current level: mod assist  Short term bathing goal: min assist  Long term bathing goal: min assist    Homemaking:   Current level: NA    Upper body dressing:  Current level: min assist- poor safety  Short term upper body dressing goal: supervision  Long term upper body dressing goal: supervision    Lower body dressing:                                                                          Current level: mod assist             Short term lower body dressing goal: mod assist          Long term lower body dressing goal: mod assist            Footwear  Current level: mod   Short term goal: mod assist  Long term goal:mod assist      Toilet transfer:   Current level: with transfer board  to drop arm commode is max assist, verbal cues for safety , poor carryover        SPEECH THERAPY  Swallowing:  Current level:  dependent  Short term swallowing goal: min assist  Long term swallowing Goal: supervision    Comprehension:   Current level: supervision  Short term comprehension goal: Modified independent  Long term comprehension goal: Modified independent    Expression:   Current level: supervision  Short term expression goal: Modified independent  Long term expression goal: Modified independent    Problem solving:   Current level: min assist  Short term problem solving goal: supervision  Long term problem solving goal: Modified independent    Memory:  Current level: mod assist-min assist  Short term memory goal: min assist  Long term memory goal: supervision    Social interaction:  Modified independent    Safety awareness: fair -      Patient/family's personal goals: return home  Factors supporting goal achievement:  good support system  Factors hindering goal achievement:  trach, dysphagia    Discharge Plan   Estimated Length of Stay: 2 weeks            Destination: home  Services at Discharge: to be assessed  Equipment at Discharge: to be assessed      INTERDISCIPLINARY TEAM/PHYSICIAN RECOMMENDATION AND/OR REVISIONS OF PLAN OF CARE:  update family, family education with son      I approve the established interdisciplinary plan of care as documented within the medical record of Yane Chen. Electronically signed by Manuel Bolden RN  on 2/16/2021 at 8:48 AM  The following interdisciplinary team members were present:  ALLYN Correia, MSSA,LSW  Manny Rosales.  Chente Norton, DPWILLIAM Yanes, OTR  Krystal Gomez, CCC-SLP

## 2021-02-16 NOTE — CARE COORDINATION
Per team  reeval next week ( 2 week ELOS). Updated the pt and the pt son Charisse Coleman. 24 hour hands on supervision is recommended at discharge. The pt son Chairsse Coleman will be providing the 24 hour care. The pt is limited by LE weakness, he is using the slide board it is working better for his transfers. The pt is having poor insight and needing verbal cues for all. Pt also has an NPO for tube feed. Pt will be discharging home with the trache. DME and Aftercare TBA    FT 2/17 PM and 2/19 AM with vincent Coleman.      Torito Schaefer  Intern  Bernardo West  2/16/2021

## 2021-02-16 NOTE — PROGRESS NOTES
OCCUPATIONAL THERAPY DAILY NOTE    Date:2021  Patient Name: Tracy Solorio  MRN: [id number]  : 1945  Room: [address] [place] [address]     Diagnosis: RF secondary to aspiration @ home, trach & peg  Additional Pertinent Hx: CHF, COPD, hx CVA, dysphagia, peg heart disease  Precautions: Falls, NPO, Peg    Functional Assessment:   Date Status AE  Comments   Feeding 21 NPO     Grooming 21 SBA seated Seated at EOB pt washed face/hands with SBA for dynamic sitting balance/safety. Oral Care 21 SBA/S  Pt deferred this date. Will continue to assess. Bathing 21 Mod A Seated Sponge bath completed seated at EOB and in supine. Pt requires assist to wash L upper arm, R foot and buttocks. Min cues for safety and adaptive techniques provided with fair tolerance. Pt requires SBA for dynamic sitting balance during task d/t occasional unsteadiness. UB Dressing 21 SBA/S  To don pull over shirt seated at EOB with SBA/S for dynamic sitting balance/safety. LB Dressing 21 Mod A  To don brief and pants. Pt demo'd increased ability to thread B LE's this date only requiring assist to thread R LE into brief. Pt required assist for sitting balance/safety and to return to supine and manage clothing over hips. Footwear 21 Mod A  Pt donned slip on shoes seated at EOB with assist required to place shoes and for dynamic sitting balance/safety. Pt deferred donning socks this date. Toileting 2/3/21 Max A Bed level     Homemaking 2/10/21 SBA Laundry      Functional Transfers / Balance:   Date Status DME  Comments   Sit Balance 21 SBA  Seated at EOB during functional dynamic sitting balance/safety. Stand Balance 21 Mod A x2     [] Tub  [] Shower   Transfer  TBA-  Pt sponge bathed at EOB PTA.      Commode   Transfer 2/15/21 Max/Mod A Bariatric 3-in-1, transfer board    Functional   Mobility 21 SBA/S W/c Pt completed functional item retrieval throughout OT clinic with focus on utilizing reacher for retrieval from low/high surfaces and maneuvering safely around environmental obstacles with min cues for safety and technique. Pt demo'd fair ability to maneuver with increased time required to complete. Other:  Bed>W/c      Supine to EOB   2/16/21 2/16/21   Min A      SBA   Transfer board    Bed rail   Assist required for board placement and balance/safety during transfer. Min cues for body mechanics required with fair follow through. Functional Exercises / Activity  -Medium resistance putty/wheel activity completed seated at table with focus on increasing B UE strength and overall endurance for functional activities. Fair tolerance.   -Shoulder arc completed seated at table with B UE's x2 to increase ROM and overall endurance for functional activities. Limited with R UE. Fair tolerance. -5# Sanderbox completed x30 reps in all planes with focus on increasing B UE strength and overall endurance for functional activities. Fair tolerance.   -Towel stretches completed seated at table with focus on increasing ROM and providing gentle stretch to B shoulders with fair+ tolerance. Sensory / Neuromuscular Re-Education:      Cognitive Skills:   Status Comments   Problem   Solving fair  Demo during therapeutic activities. Memory fair  \"   Sequencing fair  \"   Safety fair - Demo during sliding board transfers from bed into w/c. Visual Perception:    Education:  Pt educated on body mechanics/posture during transfer board transfers. Pt verbalized/demonstrated fair- understanding, recommend continued education. [] Family teach completed on:    Pain Level: No c/o pain. Additional Notes:    2/15/21: Dynamometer:R: 34        L: 10       9 Hole Peg: R: 49 Sec         L: 35 Sec     Patient has made fair  progress during treatment sessions toward set goals.  Therapy emphasis to obtain goals:  Current Treatment Recommendations: Strengthening, Gait Training, Patient/Caregiver Education & Training, Home Management Training, ROM, Equipment Evaluation, Education, & procurement, Balance Training, Functional Mobility Training, Endurance Training, Wheelchair Mobility Training, Safety Education & Training, Self-Care / ADL      Long term goal 2: Pt demo Mod I grooiming seated  Long term goal 3: Pt demo Min A bathing @ sink level or shower level seated  Long term goal 4: Pt demo s/u UE & Min A LE dress to don depends, pants, socks & shoes using AE as needed  Long term goal 5: Pt demo Min-Mod  A commode trf wtih appropriate DME to ensure pt safety  Long term goals 6: Pt demo Min-Mod A tub trf with appropriate DME to ensure pt safety  Long term goal 7: Pt demo Sup light homemaking activity @ wc level  Long term goal 8: Pt demo G- endurance for a 20 minute funcitonal activity  Long term goal 9: Pt demo increase BUE strength to 4+/5 to improve pt ability to complete ADLs  Long term goal 10: Pt demo Mod I wc propulsion throughout a living environment & around obstacles    2/16/21: Pt plan of care updated on this date. Family teach scheduled for 2/19/21. Pt tentative length of stay is 2 weeks. [x] Continue with current OT Plan of care.   [] Prepare for Discharge     DISCHARGE RECOMMENDATIONS  Recommended DME: transfer board, bariatric 3-in-1    Post Discharge Care:   []Home Independently  [x]Home with 24hr Care / Supervision []Home with Partial Supervision []Home with Home Health OT []Home with Out Pt OT []Other: ___   Comments:         Time in Time out Tx Time Breakdown  Variance:   First Session  0815 0915 [x] Individual  tx -60 Mins  [] Concurrent Tx-   [] Co-Tx -   [] Group Tx -   [] Time Missed -     Second Session [id number] [x] Individual Tx-30 Mins  [] Concurrent Tx -  [] Co-Tx -   [] Group Tx -   [] Time Missed -     Third Session    [] Individual Tx-   [] Concurrent Tx -  [] Co-Tx -   [] Group Tx -   [] Time Missed -         Total Tx Time:90 Mins      Sherlyn ABDULLAHI/ROJELIO [id number]

## 2021-02-16 NOTE — PROGRESS NOTES
Solving                          Current Status            4--Minimal Assistance               Short Term Goal         5--Supervision               Long Term Goal          6--Modified Independent                          Memory                          Current Status            4--Minimal Assistance  / 3--Moderate Assistance                     Short Term Goal         4--Minimal Assistance   Long Term Goal          5--Supervision                                              SWALLOWING:      Diet:  NPO (nothing by mouth including oral meds). PEG tube in place. Chronic dysphagia reported but inconsistent information provided by patient. Lengthy case history taken by other SLP from patient and from pulmonary NP- see MBS report. Note from 2/4: Speech Pathologist (SLP) completed education with the patient/family regarding procedure of Modified Barium Swallow Study prior to exam and then type of swallowing impairment following completion of MBSS. Reviewed current solid/liquid consistency diet recommendations (NPO (nothing by mouth including oral meds). Discussed strategies trialed (as previously used by Pt) with poor outcome and continued aspiration. Images from MBSS reviewed with patient/ family and education provided. Reviewed aspiration precautions. Encouraged patient and/or family to engage SLP in unstructured Q&A session relative to identified deficit areas; indicated understanding of all information provided via satisfactory verbal response. Patient completed exercises for tongue base and laryngeal muscles with fair outcome. LANGUAGE:     Patient participated well in conversational speech with SLP and SLP student. Expressive and receptive language observed to be within functional limits. COGNITION:        Patient participated recreational cognitive activity with a focus on sequencing, probleming and recall.  Patient had fair recall of rules and expectations, but benefited from occasional minimal cues. When counting cards, Patient inconsistently kept accurate count. Safety:  Fair-  Other therapies (PT, OT) report some concern with safety. SPEECH:     Difficult to understand at times due to facial weakness and absence of teeth. SP recommended after discharge: yes  Supervision recommended at discharge: 24 hour    Will continue SP intervention as per previously established POC.     Minute Tracking:    Individual therapy:    45 minutes  Concurrent therapy:    0  minutes  Group therapy:     0 minutes  Co-treatment therapy:    0 minutes    Total minutes for 2/16/2021: 45 minutes    Meghan Castillo   SLP student intern

## 2021-02-16 NOTE — PROGRESS NOTES
results for input(s): AST, ALT, BILIDIR, BILITOT, ALKPHOS in the last 72 hours. Imaging Last 24 Hours:  No results found. Assessment//Plan           Hospital Problems           Last Modified POA    Acute metabolic encephalopathy 9/9/2317 Yes    Severe protein-calorie malnutrition (HCC) (Chronic) 2/4/2021 Yes        Assessment:    Condition: In stable condition. Improving. (Metabolic encephalopathy). Plan:   Encourage ambulation. (Patient has extensive lacerations of the face from trying to shave himself  I have addressed that with OT this morning  We are going to have to deal with that issue because it is indicative of his poor judgment on ADLs  I also want to address the problems with his gait in therapy  I think he should be doing better  That is going to affect our ability to wean the trach).        Electronically signed by Daniel Ferreira MD on 2/16/21 at 7:48 AM EST

## 2021-02-17 LAB
ALBUMIN SERPL-MCNC: 3.2 G/DL (ref 3.5–5.2)
ALP BLD-CCNC: 104 U/L (ref 40–129)
ALT SERPL-CCNC: 18 U/L (ref 0–40)
AMMONIA: 78 UMOL/L (ref 16–60)
ANION GAP SERPL CALCULATED.3IONS-SCNC: 8 MMOL/L (ref 7–16)
AST SERPL-CCNC: 23 U/L (ref 0–39)
BILIRUB SERPL-MCNC: 0.9 MG/DL (ref 0–1.2)
BUN BLDV-MCNC: 26 MG/DL (ref 8–23)
CALCIUM SERPL-MCNC: 9.3 MG/DL (ref 8.6–10.2)
CHLORIDE BLD-SCNC: 102 MMOL/L (ref 98–107)
CO2: 31 MMOL/L (ref 22–29)
CREAT SERPL-MCNC: 0.7 MG/DL (ref 0.7–1.2)
GFR AFRICAN AMERICAN: >60
GFR NON-AFRICAN AMERICAN: >60 ML/MIN/1.73
GLUCOSE BLD-MCNC: 108 MG/DL (ref 74–99)
HCT VFR BLD CALC: 42.3 % (ref 37–54)
HEMOGLOBIN: 13.3 G/DL (ref 12.5–16.5)
MCH RBC QN AUTO: 27.5 PG (ref 26–35)
MCHC RBC AUTO-ENTMCNC: 31.4 % (ref 32–34.5)
MCV RBC AUTO: 87.4 FL (ref 80–99.9)
PDW BLD-RTO: 15.8 FL (ref 11.5–15)
PLATELET # BLD: 152 E9/L (ref 130–450)
PMV BLD AUTO: 11.2 FL (ref 7–12)
POTASSIUM SERPL-SCNC: 4.5 MMOL/L (ref 3.5–5)
RBC # BLD: 4.84 E12/L (ref 3.8–5.8)
SODIUM BLD-SCNC: 141 MMOL/L (ref 132–146)
TOTAL PROTEIN: 6.6 G/DL (ref 6.4–8.3)
WBC # BLD: 2.7 E9/L (ref 4.5–11.5)

## 2021-02-17 PROCEDURE — 6370000000 HC RX 637 (ALT 250 FOR IP): Performed by: PHYSICAL MEDICINE & REHABILITATION

## 2021-02-17 PROCEDURE — 97129 THER IVNTJ 1ST 15 MIN: CPT

## 2021-02-17 PROCEDURE — 97530 THERAPEUTIC ACTIVITIES: CPT

## 2021-02-17 PROCEDURE — 97110 THERAPEUTIC EXERCISES: CPT

## 2021-02-17 PROCEDURE — 94640 AIRWAY INHALATION TREATMENT: CPT

## 2021-02-17 PROCEDURE — 1280000000 HC REHAB R&B

## 2021-02-17 PROCEDURE — 36415 COLL VENOUS BLD VENIPUNCTURE: CPT

## 2021-02-17 PROCEDURE — 85027 COMPLETE CBC AUTOMATED: CPT

## 2021-02-17 PROCEDURE — 97130 THER IVNTJ EA ADDL 15 MIN: CPT

## 2021-02-17 PROCEDURE — 80053 COMPREHEN METABOLIC PANEL: CPT

## 2021-02-17 PROCEDURE — 82140 ASSAY OF AMMONIA: CPT

## 2021-02-17 PROCEDURE — 6370000000 HC RX 637 (ALT 250 FOR IP): Performed by: OPHTHALMOLOGY

## 2021-02-17 PROCEDURE — 6370000000 HC RX 637 (ALT 250 FOR IP): Performed by: CLINICAL NURSE SPECIALIST

## 2021-02-17 RX ORDER — LACTULOSE 10 G/15ML
20 SOLUTION ORAL 2 TIMES DAILY
Status: DISCONTINUED | OUTPATIENT
Start: 2021-02-17 | End: 2021-02-21

## 2021-02-17 RX ADMIN — APIXABAN 5 MG: 5 TABLET, FILM COATED ORAL at 07:59

## 2021-02-17 RX ADMIN — APIXABAN 5 MG: 5 TABLET, FILM COATED ORAL at 20:21

## 2021-02-17 RX ADMIN — Medication: at 20:25

## 2021-02-17 RX ADMIN — IPRATROPIUM BROMIDE AND ALBUTEROL SULFATE 1 AMPULE: 2.5; .5 SOLUTION RESPIRATORY (INHALATION) at 10:39

## 2021-02-17 RX ADMIN — POLYVINYL ALCOHOL 1 DROP: 14 SOLUTION/ DROPS OPHTHALMIC at 13:13

## 2021-02-17 RX ADMIN — POLYVINYL ALCOHOL 1 DROP: 14 SOLUTION/ DROPS OPHTHALMIC at 20:22

## 2021-02-17 RX ADMIN — ASPIRIN 81 MG: 81 TABLET, CHEWABLE ORAL at 07:58

## 2021-02-17 RX ADMIN — METOPROLOL TARTRATE 25 MG: 25 TABLET, FILM COATED ORAL at 07:59

## 2021-02-17 RX ADMIN — RIFAXIMIN 550 MG: 550 TABLET ORAL at 07:58

## 2021-02-17 RX ADMIN — RIFAXIMIN 550 MG: 550 TABLET ORAL at 20:21

## 2021-02-17 RX ADMIN — ATORVASTATIN CALCIUM 20 MG: 20 TABLET, FILM COATED ORAL at 20:21

## 2021-02-17 RX ADMIN — POLYVINYL ALCOHOL 1 DROP: 14 SOLUTION/ DROPS OPHTHALMIC at 16:30

## 2021-02-17 RX ADMIN — IPRATROPIUM BROMIDE AND ALBUTEROL SULFATE 1 AMPULE: 2.5; .5 SOLUTION RESPIRATORY (INHALATION) at 13:51

## 2021-02-17 RX ADMIN — METOPROLOL TARTRATE 25 MG: 25 TABLET, FILM COATED ORAL at 20:22

## 2021-02-17 RX ADMIN — ACETAMINOPHEN 650 MG: 325 TABLET ORAL at 11:26

## 2021-02-17 RX ADMIN — ACETAMINOPHEN 650 MG: 325 TABLET, FILM COATED ORAL at 20:21

## 2021-02-17 RX ADMIN — CHLORHEXIDINE GLUCONATE 0.12% ORAL RINSE 15 ML: 1.2 LIQUID ORAL at 07:58

## 2021-02-17 RX ADMIN — PANTOPRAZOLE SODIUM 40 MG: 40 TABLET, DELAYED RELEASE ORAL at 06:00

## 2021-02-17 RX ADMIN — IPRATROPIUM BROMIDE AND ALBUTEROL SULFATE 1 AMPULE: 2.5; .5 SOLUTION RESPIRATORY (INHALATION) at 20:42

## 2021-02-17 RX ADMIN — MINERAL OIL AND WHITE PETROLATUM: 150; 830 OINTMENT OPHTHALMIC at 20:22

## 2021-02-17 RX ADMIN — GLYCOPYRROLATE 1 MG: 1 TABLET ORAL at 07:58

## 2021-02-17 RX ADMIN — Medication: at 07:59

## 2021-02-17 RX ADMIN — IPRATROPIUM BROMIDE AND ALBUTEROL SULFATE 1 AMPULE: 2.5; .5 SOLUTION RESPIRATORY (INHALATION) at 17:13

## 2021-02-17 ASSESSMENT — PAIN SCALES - GENERAL
PAINLEVEL_OUTOF10: 0
PAINLEVEL_OUTOF10: 5
PAINLEVEL_OUTOF10: 0

## 2021-02-17 ASSESSMENT — PAIN DESCRIPTION - PROGRESSION
CLINICAL_PROGRESSION: NOT CHANGED
CLINICAL_PROGRESSION: NOT CHANGED

## 2021-02-17 ASSESSMENT — PAIN - FUNCTIONAL ASSESSMENT: PAIN_FUNCTIONAL_ASSESSMENT: ACTIVITIES ARE NOT PREVENTED

## 2021-02-17 ASSESSMENT — PAIN DESCRIPTION - ONSET: ONSET: ON-GOING

## 2021-02-17 ASSESSMENT — PAIN DESCRIPTION - DESCRIPTORS: DESCRIPTORS: ACHING;DISCOMFORT

## 2021-02-17 ASSESSMENT — PAIN DESCRIPTION - PAIN TYPE: TYPE: CHRONIC PAIN

## 2021-02-17 ASSESSMENT — PAIN DESCRIPTION - ORIENTATION: ORIENTATION: RIGHT

## 2021-02-17 ASSESSMENT — PAIN DESCRIPTION - LOCATION: LOCATION: SHOULDER

## 2021-02-17 NOTE — PROGRESS NOTES
Speech Language Pathology  ACUTE REHABILITATION--DAILY PROGRESS NOTE          ADMITTING DIAGNOSIS: Acute metabolic encephalopathy [A24.35]     SPEECH PATHOLOGY DIAGNOSIS:    Mild-moderate cognitive deficit. 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 Botox received in 2008 with short term success). Decreased intelligibility due to weakness, missing dentition, fast rate and resonance issues.     THERAPY RECOMMENDATIONS:   Speech Pathology intervention is recommended 3-6 times per week for LOS or when goals are met with emphasis on the following:      Improve STM recall, sequencing and problem solving for increased functional independence with completion of ADLs/IADLs to a FIM level commensurate with modified independent.                                                                                                                                         FIMS SCORES                            Swallowing                          Current Status             1--Total Assistance                   Short Term Goal         4--Minimal Assistance               Long Term Goal          5--Supervision      Receptive                          Current Status             5--Supervision               Short Term Goal         6--Modified Independent                       Long Term Goal          6--Modified Independent              Expressive                          Current Status             5--Supervision               Short Term Goal         6--Modified Independent                       Long Term Goal          6--Modified Independent              Social Interaction                          Current Status             6--Modified Independent                       Short Term Goal         6--Modified Independent                       Long Term Goal          6--Modified Independent                                                             Problem Solving                          Current Status            4--Minimal Assistance / 3--Moderate Assistance               Short Term Goal          4--Minimal Assistance              Long Term Goal            5--Supervision                        Memory                          Current Status            4--Minimal Assistance  / 3--Moderate Assistance                     Short Term Goal         4--Minimal Assistance   Long Term Goal          5--Supervision                                              SWALLOWING:      Diet:  NPO (nothing by mouth including oral meds). PEG tube in place. Chronic dysphagia reported but inconsistent information provided by patient. Lengthy case history taken by other SLP from patient and from pulmonary NP- see MBS report. Note from 2/4: Speech Pathologist (SLP) completed education with the patient/family regarding procedure of Modified Barium Swallow Study prior to exam and then type of swallowing impairment following completion of MBSS. Reviewed current solid/liquid consistency diet recommendations (NPO (nothing by mouth including oral meds). Discussed strategies trialed (as previously used by Pt) with poor outcome and continued aspiration. Images from MBSS reviewed with patient/ family and education provided. Reviewed aspiration precautions. Encouraged patient and/or family to engage SLP in unstructured Q&A session relative to identified deficit areas; indicated understanding of all information provided via satisfactory verbal response. Exercises for tongue base and laryngeal muscles not completed this session -- patient encouraged to complete independently. LANGUAGE:     Patient participated well in conversational speech with SLP and SLP student. Expressive and receptive language observed to be within functional limits.          COGNITION:        Completed activity which required patient to refer to a TV schedule with 7 channels and shows listed from 7:00pm-9:30pm. Patient was asked to answer questions based on the schedule provided (e.g. \"You enjoy game shows. What channel might you tune in to?\" \"It's 7:00pm. Can you see all of the movie 'Treks'? \"). Patient completed task with 60% accuracy given mod v/c.       fair outcome with completion of deductive reasoning task in which patient was asked to deduce the relationships between different people, places and things based on a limited number of clues given in the puzzle. Patient required moderate cues for thought organization. Safety:  Fair-   Other therapies (PT, OT) report concerns with safety. SPEECH:     Difficult to understand at times due to facial weakness and absence of teeth. SP recommended after discharge: yes  Supervision recommended at discharge: 24 hour    Will continue SP intervention as per previously established POC.     Minute Tracking:    Individual therapy:    45 minutes  Concurrent therapy:    0  minutes  Group therapy:     0 minutes  Co-treatment therapy:    0 minutes    Total minutes for 2/17/2021: 45 minutes

## 2021-02-17 NOTE — PROGRESS NOTES
Physical Therapy  Treatment Note  Evaluating Therapist: Nohelia Bales DPT    NAME: El Moraes  ROOM: [id number]  DIAGNOSIS: Acute metabolic encephalopathy  PRECAUTIONS: Falls, PEG tube, trach (capped), L hemiparesis, LE proprioceptive deficits  HPI: 76year old male admitted to [facility] on 11/18/20 with respiratory failure secondary to witnessed aspiration event at home - chronic dysphagia with feeding tube. Intubated twice at [facility] with subsequent trach placement. Admitted to [facility] for vent weaning. Transferred to ARU 2/1. Social:  Pt lives with his son in a trailer with 8 steps with B rails or a ramp to enter. Prior to admission pt ambulated with [employer] Mod I. Initial Evaluation  2/3/21 AM     PM    Short Term Goals Long Term Goals    Was pt agreeable to Eval/treatment? yes yes yes     Does pt have pain? L wrist and R shoulder pain L wrist and B shoulder pain L wrist and B shoulder pain     Bed Mobility  Rolling: SBA  Supine to sit: SBA  Sit to supine: SBA  Scooting: SBA Supervision all aspects Supervision all aspects Supervision Independent   Transfers Sit to stand: Max A x2  Stand to sit: Max A x2  Stand pivot: NT  Slideboard: Max A Sit to stand: Max A  Stand to sit: Max A  Stand pivot: NT  Slideboard: SBA/Min A (assist for slideboard placement and removal) Sit to stand: Max A  Stand to sit:  Max A  Stand pivot: NT  Slideboard: SBA/Min A (assist for slideboard placement and removal) Min A SBA   Ambulation   NT 10 feet x1 rep and 4 feet x2 reps in parallel bars with Max A + WC follow 10 feet x2 reps in parallel bars with Mod A + WC follow  25 feet with [employer] with Min A >75 feet with WW with SBA   Walking 10 feet on uneven surface NT NT NT 10 feet with [employer] with Min A 10 feet with [employer] with SBA   Wheel Chair Mobility 100 feet  feet Supervision 300 feet Supervision >200 feet Supervision >200 feet Independent   Car Transfers NT NT NT Min A SBA   Stair negotiation: ascended and descended NT NT NT 4 steps with B rail with Min A 4 steps with B rail with SBA   Curb Step:   ascended and descended NT NT NT     Picking up object off the floor NT NT NT     BLE ROM WFL WFL      BLE Strength RLE 4/5  LLE 3+/5 RLE 4/5  LLE 3+/5      Balance  Sitting: SBA   Static stand: Max A x2 Sitting: Supervision  Static stand: Max A       Date Family Teach Completed TBA Son was a no-show for family training twice      Is additional Family Teaching Needed? Y or N Y Y      Hindering Progress Debility, L hemiparesis Debility, L hemiparesis      PT recommended ELOS 4-5 weeks       Team's Discharge Plan        Therapist at Team Meeting          Therapeutic Exercise:   AM:   - Slideboard transfer wheelchair<>mat table  PM:  - Slideboard transfer wheelchair<>mat table  - Standing mini squats in parallel bars 2x15 reps to improve BLE strength and emphasize establishing TKE    Additional Comments: Pt continues to have significant functional deficits as a result of the pt's debility and impaired strength levels. Pt still requires heavy assistance to perform sit<>stand transfer and ambulation in parallel bars. Increased difficulty observed today with ambulation as pt was unable to achieve TKE nor maintain it. Pt was also unable to achieve full hip extension and thus required significant assistance from PT to lock hips into neutral. Pt advances the RLE by sliding it forward as he lacks the functional strength to appropriately advance it. Continuing to emphasize improving the pt's ability to function at a wheelchair level due to the pt's impaired function with standing and walking. Ambulation at this point is being included for therapeutic purposes only as he is currently not a functional ambulator.      Patient/family education  Pt/family educated on standing posture, ambulation mechanics in parallel bars, slideboard transfer technique/safety    Patient/family response to education:   Pt/family verbalized understanding Pt/family demonstrated skill Pt/family requires further education in this area   yes With cues/assist yes     AM  Time in: 0915  Time out: 1000  PM   Time in: 1515  Time out: 1600    Pt is making fair progress toward established Physical Therapy goals. Continue with physical therapy current plan of care.     Sherly Hay, JOVITAT [id number]

## 2021-02-17 NOTE — PROGRESS NOTES
OCCUPATIONAL THERAPY DAILY NOTE    Date:2021  Patient Name: Bonnie Dotson  MRN: [id number]  : 1945  Room: [address] [place], [place] [address]     Diagnosis: RF secondary to aspiration @ home, trach & peg  Additional Pertinent Hx: CHF, COPD, hx CVA, dysphagia, peg heart disease  Precautions: Falls, NPO, Peg    Functional Assessment:   Date Status AE  Comments   Feeding 21 NPO     Grooming 21 SBA seated          Oral Care 21 SBA/S     Bathing 21 Mod A Seated    UB Dressing 21 SBA/S     LB Dressing 21 Mod A     Footwear 21 Mod A     Toileting 2/3/21 Max A Bed level     Homemaking 2/10/21 SBA Laundry      Functional Transfers / Balance:   Date Status DME  Comments   Sit Balance 21 SBA     Stand Balance 21 Mod A x2     [] Tub  [] Shower   Transfer  TBA-  Pt sponge bathed at EOB PTA. Commode   Transfer 2/15/21 Max/Mod A Bariatric 3-in-1, transfer board    Functional   Mobility 21 SBA/S W/c Pt completed functional item retrieval activity w/c level utilizing reacher PRN to maneuver around OT clinic with multiple obstacles setup with focus on safely maneuvering w/c in crowded area and safe functional item retrieval techniques. Min cues for techniques and safety provided with fair follow through. Other:  Bed>W/c      Supine to EOB   21   Min A      SBA   Transfer board    Bed rail         Functional Exercises / Activity  -2# weighted ball exercises completed 3x10 reps to tolerance with focus on increasing B UE strength/endurance for functional activities.   -Pt completed therapeutic leisure mechanical activity seated with 1# wrist weights donned with focus on increasing B UE strength, [facility] and trunk control/core strength while completing unsupported sitting in chair to complete activity. Pt demo'd fair+ ability to disassemble and re-assemble with min cues.    -2# MrMargarita completed x15 reps with fair tolerance to increase B UE strength and overall endurance for functional activities. Fair tolerance. -Resistive hand gripper completed x25 with B hands with focus on increasing B hand/ strength. Fair tolerance. Sensory / Neuromuscular Re-Education:      Cognitive Skills:   Status Comments   Problem   Solving fair  Demo during therapeutic activities. Memory fair  \"   Sequencing fair  \"   Safety fair - Demo during sliding board transfers from bed into w/c. Visual Perception:    Education:  Pt educated on w/c mobility safety techniques. Pt verbalized/demonstrated fair- understanding, recommend continued education. [] Family teach completed on: 2/17/21: No family present for scheduled FT session this date. Pain Level: No c/o pain. Additional Notes:    2/15/21: Dynamometer:R: 34        L: 10       9 Hole Peg: R: 49 Sec         L: 35 Sec     Patient has made fair  progress during treatment sessions toward set goals.  Therapy emphasis to obtain goals:  Current Treatment Recommendations: Strengthening, Gait Training, Patient/Caregiver Education & Training, Home Management Training, ROM, Equipment Evaluation, Education, & procurement, Balance Training, Functional Mobility Training, Endurance Training, Wheelchair Mobility Training, Safety Education & Training, Self-Care / ADL      Long term goal 2: Pt demo Mod I grooiming seated  Long term goal 3: Pt demo Min A bathing @ sink level or shower level seated  Long term goal 4: Pt demo s/u UE & Min A LE dress to don depends, pants, socks & shoes using AE as needed  Long term goal 5: Pt demo Min-Mod  A commode trf wtih appropriate DME to ensure pt safety  Long term goals 6: Pt demo Min-Mod A tub trf with appropriate DME to ensure pt safety  Long term goal 7: Pt demo Sup light homemaking activity @ wc level  Long term goal 8: Pt demo G- endurance for a 20 minute funcitonal activity  Long term goal 9: Pt demo increase BUE strength to 4+/5 to improve pt ability to complete ADLs  Long term goal 10: Pt demo Mod I wc propulsion throughout a living environment & around obstacles    2/16/21: Pt plan of care updated on this date. Family teach scheduled for 2/19/21. Pt tentative length of stay is 2 weeks. [x] Continue with current OT Plan of care. [] Prepare for Discharge     DISCHARGE RECOMMENDATIONS  Recommended DME: transfer board, bariatric 3-in-1    Post Discharge Care:   []Home Independently  [x]Home with 24hr Care / Supervision []Home with Partial Supervision []Home with Home Health OT []Home with Out Pt OT []Other: ___   Comments:         Time in Time out Tx Time Breakdown  Variance:   First Session  0815 0915 [x] Individual  tx -60 Mins  [] Concurrent Tx-   [] Co-Tx -   [] Group Tx -   [] Time Missed -     Second Session 1300 1345 [x] Individual Tx-30 Mins  [] Concurrent Tx -  [] Co-Tx -   [] Group Tx -   [] Time Missed -     Third Session    [] Individual Tx-   [] Concurrent Tx -  [] Co-Tx -   [] Group Tx -   [] Time Missed -         Total Tx Time:90 Mins      Mathew Carpio ABDULLAHI/L [id number]      I have read & agree with the above status.     Jody Hansen OTR/L [id number]

## 2021-02-17 NOTE — PROGRESS NOTES
Progress Note  Date:2021       Room:55055505-  Patient Name:Isael Rachel     Date of Birth:     Age:76 y.o. Subjective    Subjective:  Symptoms:  Stable. He reports weakness. Diet:  NPO. Activity level: Impaired due to weakness. Pain:  He reports no pain. Review of Systems   Neurological: Positive for weakness. Objective         Vitals Last 24 Hours:  TEMPERATURE:  Temp  Av.7 °F (36.5 °C)  Min: 97.2 °F (36.2 °C)  Max: 98.1 °F (36.7 °C)  RESPIRATIONS RANGE: Resp  Av.5  Min: 16  Max: 18  PULSE OXIMETRY RANGE: SpO2  Av.8 %  Min: 95 %  Max: 97 %  PULSE RANGE: Pulse  Av.3  Min: 84  Max: 100  BLOOD PRESSURE RANGE: Systolic (00UQS), CIT:463 , Min:118 , VRY:294   ; Diastolic (15KMQ), UBZ:27, Min:73, Max:81    I/O (24Hr): Intake/Output Summary (Last 24 hours) at 2021 0745  Last data filed at 2021 0033  Gross per 24 hour   Intake 800 ml   Output --   Net 800 ml     Objective:  General Appearance: In no acute distress. Vital signs: (most recent): Blood pressure 118/80, pulse 84, temperature 98.1 °F (36.7 °C), temperature source Temporal, resp. rate 18, height 6' 3\" (1.905 m), weight 212 lb 8 oz (96.4 kg), SpO2 96 %. Vital signs are normal.    Output: Producing urine and producing stool. HEENT: (Trach)    Lungs:  Normal effort and normal respiratory rate. Breath sounds clear to auscultation. Heart: Normal rate. Regular rhythm. S1 normal and S2 normal.    Abdomen: Abdomen is soft. Bowel sounds are normal.   There is no abdominal tenderness. Extremities: Normal range of motion. Neurological: Patient is alert. (4/5 strength).       Labs/Imaging/Diagnostics    Labs:  CBC:  Recent Labs     21  0614   WBC 2.7*   RBC 4.84   HGB 13.3   HCT 42.3   MCV 87.4   RDW 15.8*        CHEMISTRIES:  Recent Labs     21  0614      K 4.5      CO2 31*   BUN 26*   CREATININE 0.7   GLUCOSE 108*     PT/INR:No results for input(s): PROTIME, INR in the last 72 hours. APTT:No results for input(s): APTT in the last 72 hours. LIVER PROFILE:  Recent Labs     02/17/21  0614   AST 23   ALT 18   BILITOT 0.9   ALKPHOS 104       Imaging Last 24 Hours:  No results found. Assessment//Plan           Hospital Problems           Last Modified POA    Acute metabolic encephalopathy 5/0/7820 Yes    Severe protein-calorie malnutrition (HCC) (Chronic) 2/4/2021 Yes          Initial Evaluation  2/3/21 AM     PM    Short Term Goals Long Term Goals    Was pt agreeable to Eval/treatment? yes yes yes       Does pt have pain? L wrist and R shoulder pain L wrist and B shoulder pain L wrist and B shoulder pain       Bed Mobility  Rolling: SBA  Supine to sit: SBA  Sit to supine: SBA  Scooting: SBA Supervision all aspects Supervision all aspects Supervision Independent   Transfers Sit to stand: Max A x2  Stand to sit: Max A x2  Stand pivot: NT  Slideboard: Max A Sit to stand: Max A  Stand to sit: Max A  Stand pivot: NT  Slideboard: SBA/Min A (assist for slideboard placement and removal) Sit to stand: Max A  Stand to sit:  Max A  Stand pivot: NT  Slideboard: SBA/Min A (assist for slideboard placement and removal) Min A SBA   Ambulation   NT 10 feet x2 reps in parallel bars with Mod/Max A + WC follow 10 feet in parallel bars with Mod A + WC follow (attempted backwards walking at the end of forward ambulation however pt unable to advance his legs backwards) 25 feet with [employer] with Min A >75 feet with WW with SBA   Walking 10 feet on uneven surface NT NT NT 10 feet with [employer] with Min A 10 feet with WW with SBA   Wheel Chair Mobility 100 feet  feet Supervision 200 feet Supervision >200 feet Supervision >200 feet Independent   Car Transfers NT NT NT Min A SBA   Stair negotiation: ascended and descended NT NT NT 4 steps with B rail with Min A 4 steps with B rail with SBA   Curb Step:   ascended and descended NT NT NT       Picking up object off the Assessment:    Condition: In stable condition. Improving. (Metabolic encephalopathy). Plan:   Encourage ambulation. (Patient is doing well in a lot of areas  He is still only able to ambulate about 10 feet  He looks like he would have the strength to be able to do more  His ammonia level is still high but the liver functions are normal  I will continue the rifaximin and add lactulose).        Electronically signed by Sai Umanzor MD on 2/17/21 at 7:45 AM EST

## 2021-02-17 NOTE — PROGRESS NOTES
02/17/21 1227   Attendance   Activity   (Repair projects)   Participation Active participation   [place] Demonstrates ability to complete cognitive goals   Social Skills Interacts independently in social activity   Leisure Education Demonstrates knowledge of benefits of leisure involvement  Carmela Ulloa identified remembering & coordination as benefits .)   Time Spent With Patient   Minutes 40

## 2021-02-18 PROCEDURE — 6370000000 HC RX 637 (ALT 250 FOR IP): Performed by: PHYSICAL MEDICINE & REHABILITATION

## 2021-02-18 PROCEDURE — 97129 THER IVNTJ 1ST 15 MIN: CPT

## 2021-02-18 PROCEDURE — 97530 THERAPEUTIC ACTIVITIES: CPT

## 2021-02-18 PROCEDURE — 97110 THERAPEUTIC EXERCISES: CPT

## 2021-02-18 PROCEDURE — 6370000000 HC RX 637 (ALT 250 FOR IP): Performed by: CLINICAL NURSE SPECIALIST

## 2021-02-18 PROCEDURE — 1280000000 HC REHAB R&B

## 2021-02-18 PROCEDURE — 94640 AIRWAY INHALATION TREATMENT: CPT

## 2021-02-18 PROCEDURE — 97130 THER IVNTJ EA ADDL 15 MIN: CPT

## 2021-02-18 PROCEDURE — 97535 SELF CARE MNGMENT TRAINING: CPT

## 2021-02-18 RX ADMIN — CHLORHEXIDINE GLUCONATE 0.12% ORAL RINSE 15 ML: 1.2 LIQUID ORAL at 08:17

## 2021-02-18 RX ADMIN — ACETAMINOPHEN 650 MG: 325 TABLET, FILM COATED ORAL at 06:19

## 2021-02-18 RX ADMIN — METOPROLOL TARTRATE 25 MG: 25 TABLET, FILM COATED ORAL at 21:15

## 2021-02-18 RX ADMIN — MINERAL OIL AND WHITE PETROLATUM: 150; 830 OINTMENT OPHTHALMIC at 21:15

## 2021-02-18 RX ADMIN — RIFAXIMIN 550 MG: 550 TABLET ORAL at 08:18

## 2021-02-18 RX ADMIN — ASPIRIN 81 MG: 81 TABLET, CHEWABLE ORAL at 08:17

## 2021-02-18 RX ADMIN — APIXABAN 5 MG: 5 TABLET, FILM COATED ORAL at 21:15

## 2021-02-18 RX ADMIN — Medication: at 21:15

## 2021-02-18 RX ADMIN — APIXABAN 5 MG: 5 TABLET, FILM COATED ORAL at 08:18

## 2021-02-18 RX ADMIN — METOPROLOL TARTRATE 25 MG: 25 TABLET, FILM COATED ORAL at 08:18

## 2021-02-18 RX ADMIN — IPRATROPIUM BROMIDE AND ALBUTEROL SULFATE 1 AMPULE: 2.5; .5 SOLUTION RESPIRATORY (INHALATION) at 21:02

## 2021-02-18 RX ADMIN — POLYVINYL ALCOHOL 1 DROP: 14 SOLUTION/ DROPS OPHTHALMIC at 17:24

## 2021-02-18 RX ADMIN — RIFAXIMIN 550 MG: 550 TABLET ORAL at 21:15

## 2021-02-18 RX ADMIN — IPRATROPIUM BROMIDE AND ALBUTEROL SULFATE 1 AMPULE: 2.5; .5 SOLUTION RESPIRATORY (INHALATION) at 12:51

## 2021-02-18 RX ADMIN — LACTULOSE 20 G: 20 SOLUTION ORAL at 08:18

## 2021-02-18 RX ADMIN — PANTOPRAZOLE SODIUM 40 MG: 40 TABLET, DELAYED RELEASE ORAL at 06:14

## 2021-02-18 RX ADMIN — GLYCOPYRROLATE 1 MG: 1 TABLET ORAL at 08:17

## 2021-02-18 RX ADMIN — ACETAMINOPHEN 650 MG: 325 TABLET, FILM COATED ORAL at 21:15

## 2021-02-18 RX ADMIN — POLYVINYL ALCOHOL 1 DROP: 14 SOLUTION/ DROPS OPHTHALMIC at 21:15

## 2021-02-18 RX ADMIN — IPRATROPIUM BROMIDE AND ALBUTEROL SULFATE 1 AMPULE: 2.5; .5 SOLUTION RESPIRATORY (INHALATION) at 17:14

## 2021-02-18 RX ADMIN — ACETAMINOPHEN 650 MG: 325 TABLET, FILM COATED ORAL at 11:06

## 2021-02-18 RX ADMIN — Medication: at 08:18

## 2021-02-18 RX ADMIN — ATORVASTATIN CALCIUM 20 MG: 20 TABLET, FILM COATED ORAL at 21:15

## 2021-02-18 RX ADMIN — POLYVINYL ALCOHOL 1 DROP: 14 SOLUTION/ DROPS OPHTHALMIC at 08:18

## 2021-02-18 ASSESSMENT — PAIN DESCRIPTION - FREQUENCY: FREQUENCY: INTERMITTENT

## 2021-02-18 ASSESSMENT — PAIN DESCRIPTION - DESCRIPTORS: DESCRIPTORS: ACHING;DISCOMFORT

## 2021-02-18 ASSESSMENT — PAIN SCALES - GENERAL
PAINLEVEL_OUTOF10: 5
PAINLEVEL_OUTOF10: 10
PAINLEVEL_OUTOF10: 10

## 2021-02-18 ASSESSMENT — PAIN DESCRIPTION - ORIENTATION: ORIENTATION: RIGHT

## 2021-02-18 NOTE — PROGRESS NOTES
Supervision  Static stand: Max A       Date Family Teach Completed TBA Son was a no-show for family training twice      Is additional Family Teaching Needed? Y or N Y Y      Hindering Progress Debility, L hemiparesis Debility, L hemiparesis      PT recommended ELOS 4-5 weeks       Team's Discharge Plan        Therapist at Team Meeting          Therapeutic Exercise:   AM: Sit<>Stand transfer x 5 reps in parallel bars  PM:     Patient education  Pt educated on conscious stabilization of L knee during stance phase to avoid buckling    Patient response to education:   Pt verbalized understanding Pt demonstrated skill Pt requires further education in this area   yes partial yes     Additional Comments: Gait training resumed in parallel bars. Pt exhibits L flexed knee during stance phase which limits effective RLE advancement and contributes to tendency of L knee to buckle. L knee stability and R step height/length significantly improved with L knee block and instruction for patient to consciously lock  L knee during stance. Pt able to consistently improve R step length/height with L knee block. Pt able to complete increased number of ambulation repetitions of the entire length of the parallel bars. Significant assistance still required to achieve standing position from seated position with verbal cueing for anterior lean and standing frame sling to manually assist with hip extension,    AM  Time in: 0915  Time out: 1000    PM  Time in:  Time out:     Pt is making good progress toward established Physical Therapy goals. Continue with physical therapy current plan of care.     Cleve Mendoza, PT, DPT  [id number]

## 2021-02-18 NOTE — PROGRESS NOTES
Progress Note  Date:2021       Room:55055505-  Patient Name:Isael Rachel     Date of Birth:     Age:76 y.o. Subjective    Subjective:  Symptoms:  Stable. He reports weakness. Diet:  NPO. Activity level: Impaired due to weakness. Pain:  He reports no pain. Review of Systems   Neurological: Positive for weakness. Objective         Vitals Last 24 Hours:  TEMPERATURE:  Temp  Av.5 °F (35.8 °C)  Min: 96.5 °F (35.8 °C)  Max: 96.5 °F (35.8 °C)  RESPIRATIONS RANGE: Resp  Av.3  Min: 18  Max: 28  PULSE OXIMETRY RANGE: SpO2  Av.5 %  Min: 98 %  Max: 99 %  PULSE RANGE: Pulse  Av.5  Min: 74  Max: 87  BLOOD PRESSURE RANGE: Systolic (75RXP), FK , Min:117 , UHM:142   ; Diastolic (40VMX), FZH:44, Min:66, Max:71    I/O (24Hr): Intake/Output Summary (Last 24 hours) at 2021 0906  Last data filed at 2021 0611  Gross per 24 hour   Intake 540 ml   Output --   Net 540 ml     Objective:  General Appearance: In no acute distress. Vital signs: (most recent): Blood pressure 128/66, pulse 74, temperature 96.5 °F (35.8 °C), temperature source Oral, resp. rate 18, height 6' 3\" (1.905 m), weight 212 lb 8 oz (96.4 kg), SpO2 98 %. Vital signs are normal.    Output: Producing urine and producing stool. Lungs:  Normal effort and normal respiratory rate. Breath sounds clear to auscultation. Heart: Normal rate. Regular rhythm. S1 normal and S2 normal.    Abdomen: Abdomen is soft. Bowel sounds are normal.   There is no abdominal tenderness. Extremities: Normal range of motion. Neurological: Patient is alert. (4/5 strength).       Labs/Imaging/Diagnostics    Labs:  CBC:  Recent Labs     21  0614   WBC 2.7*   RBC 4.84   HGB 13.3   HCT 42.3   MCV 87.4   RDW 15.8*        CHEMISTRIES:  Recent Labs     21  0614      K 4.5      CO2 31*   BUN 26*   CREATININE 0.7   GLUCOSE 108*     PT/INR:No results for input(s): PROTIME, INR in the last 72 hours. APTT:No results for input(s): APTT in the last 72 hours. LIVER PROFILE:  Recent Labs     02/17/21  0614   AST 23   ALT 18   BILITOT 0.9   ALKPHOS 104       Imaging Last 24 Hours:  No results found. Assessment//Plan           Hospital Problems           Last Modified POA    Acute metabolic encephalopathy 8/6/1818 Yes    Severe protein-calorie malnutrition (HCC) (Chronic) 2/4/2021 Yes        Assessment:    Condition: In stable condition. Improving. (Metabolic encephalopathy). Plan:   Encourage ambulation. (Poor balance   some improvements in strength  Therapy is working on standing and balance  Ammonia level is elevated but the liver functions look good  I have him on lactulose and rifaximin).        Electronically signed by Mary Avitia MD on 2/18/21 at 9:06 AM EST

## 2021-02-18 NOTE — PROGRESS NOTES
OCCUPATIONAL THERAPY DAILY NOTE    Date:2021  Patient Name: Brisa Alba  MRN: [id number]  : 1945  Room: [address] [place], [place] [address]     Diagnosis: RF secondary to aspiration @ home, trach & peg  Additional Pertinent Hx: CHF, COPD, hx CVA, dysphagia, peg heart disease  Precautions: Falls, NPO, Peg    Functional Assessment:   Date Status AE  Comments   Feeding 21 NPO     Grooming 21 SBA seated          Oral Care 21 SBA/S seated Pt used mouth wash to rinse mouth seated up in w/c following NPO status. Bathing 21 Mod A Seated    UB Dressing 21 SBA/S     LB Dressing 21 Mod A     Footwear 21 Mod A     Toileting 2/3/21 Max A Bed level     Homemaking 21 SBA Laundry Pt completed simple hmkg folding pillow cases seated up in wc at apt table using BUE's to improve ROM/strength. Functional Transfers / Balance:   Date Status DME  Comments   Sit Balance 21 SBA  Seat level table top glides using large pillow to slide back and forth while leaning forwards at table needing vc's and assist for follow thru to promote core strengthening for transfers. Stand Balance 21 Mod A x2     [] Tub  [] Shower   Transfer  TBA-  Pt sponge bathed at EOB PTA. Commode   Transfer 21 Mod A Bariatric 3-in-1, transfer board slideboard to/from bariatric [facility] with assist to place/stabilize board and assist with pt balance during 1st session   Functional   Mobility 21 SBA/S W/c Pt propelled w/c from room to OT apt using BUE/BLE's to increase endurance, strength along with sitting balance and core strength for functional tasks, transfers and ADL's    Other:  Bed>W/c      Supine to EOB   21   Min A      SBA   Transfer board    Bed rail         Functional Exercises / Activity  Pt sitting in chair upon arrival to OT gym in AM. Completed commode transfer with increased time. Participated in armbike exercise, seated, 2x6 mins, to increase endurance for ease with ADLs.  Engaged in overheard arm pulleys, 2x3 mins, to increase BUE ROM for ease with ADLs. Pt appeared to have tolerated session well. 2nd session:  Pt completed simple hmkg for laundry folding with pillow cases using BUE's to increase strength/ROM for functional activities, ADL's and transfers since patient continues to be NPO deferring actual cooking tasks. Pt propelled w/c using BUE/BLE's to increase overall endurance and strengthening from room<>OT apt and back. Pt engaged in oral care at S/SBA seated up in w/c to rinse out mouth following NPO. Sensory / Neuromuscular Re-Education:      Cognitive Skills:   Status Comments   Problem   Solving fair  Demo during therapeutic activities. Arm ex's and w/c mobility. Memory fair  \"   Sequencing fair  \"   Safety fair - Demo during sliding board transfers from bed into w/c. Visual Perception:    Education:  Pt educated on w/c mobility safety techniques especially navigating thru doorways, turning and going backwards while using BUE/BLE's.      [] Family teach completed on: 2/17/21: No family present for scheduled FT session this date. Pain Level: No c/o pain. Additional Notes:    2/15/21: Dynamometer:R: 34        L: 10       9 Hole Peg: R: 49 Sec         L: 35 Sec     Patient has made fair  progress during treatment sessions toward set goals.  Therapy emphasis to obtain goals:  Current Treatment Recommendations: Strengthening, Gait Training, Patient/Caregiver Education & Training, Home Management Training, ROM, Equipment Evaluation, Education, & procurement, Balance Training, Functional Mobility Training, Endurance Training, Wheelchair Mobility Training, Safety Education & Training, Self-Care / ADL      Long term goal 2: Pt demo Mod I grooiming seated  Long term goal 3: Pt demo Min A bathing @ sink level or shower level seated  Long term goal 4: Pt demo s/u UE & Min A LE dress to don depends, pants, socks & shoes using AE as needed  Long term goal 5: Pt demo Min-Mod  A commode trf wtih appropriate DME to ensure pt safety  Long term goals 6: Pt demo Min-Mod A tub trf with appropriate DME to ensure pt safety  Long term goal 7: Pt demo Sup light homemaking activity @ wc level  Long term goal 8: Pt demo G- endurance for a 20 minute funcitonal activity  Long term goal 9: Pt demo increase BUE strength to 4+/5 to improve pt ability to complete ADLs  Long term goal 10: Pt demo Mod I wc propulsion throughout a living environment & around obstacles    2/16/21: Pt plan of care updated on this date. Family teach scheduled for 2/19/21. Pt tentative length of stay is 2 weeks. [x] Continue with current OT Plan of care.   [] Prepare for Discharge     DISCHARGE RECOMMENDATIONS  Recommended DME: transfer board, bariatric 3-in-1    Post Discharge Care:   []Home Independently  [x]Home with 24hr Care / Supervision []Home with Partial Supervision []Home with Home Health OT []Home with Out Pt OT []Other: ___   Comments:         Time in Time out Tx Time Breakdown  Variance:   First Session  0830 0915 [x] Individual  tx -45  [] Concurrent Tx-   [] Co-Tx -   [] Group Tx -   [] Time Missed -     Second Session 10:10am 10:55am [x] Individual Tx-45[] Concurrent Tx -  [] Co-Tx -   [] Group Tx -   [] Time Missed -     Third Session    [] Individual Tx-   [] Concurrent Tx -  [] Co-Tx -   [] Group Tx -   [] Time Missed -         Total Tx Time:90mins     China Cerna ABDULLAHI/L [place], [place], OTR/L [id number]

## 2021-02-18 NOTE — PROGRESS NOTES
Physical Therapy  Treatment Note  Evaluating Therapist: Wen Maciel DPT    NAME: Jacklyn Quezada  ROOM: 1500Y  DIAGNOSIS: Acute metabolic encephalopathy  PRECAUTIONS: Falls, PEG tube, trach (capped), L hemiparesis, LE proprioceptive deficits  HPI: 76year old male admitted to [facility] on 11/18/20 with respiratory failure secondary to witnessed aspiration event at home - chronic dysphagia with feeding tube. Intubated twice at [facility] with subsequent trach placement. Admitted to [facility] for vent weaning. Transferred to ARU 2/1. Social:  Pt lives with his son in a trailer with 8 steps with B rails or a ramp to enter. Prior to admission pt ambulated with [employer] Mod I. Initial Evaluation  2/3/21 AM     PM    Short Term Goals Long Term Goals    Was pt agreeable to Eval/treatment? yes yes yes     Does pt have pain? L wrist and R shoulder pain L wrist and B shoulder pain No c/o     Bed Mobility  Rolling: SBA  Supine to sit: SBA  Sit to supine: SBA  Scooting: SBA Supervision all aspects Supervision  All aspects Supervision Independent   Transfers Sit to stand: Max A x2  Stand to sit: Max A x2  Stand pivot: NT  Slideboard: Max A Sit to stand: Max A  Stand to sit:  Max A  Stand pivot: NT  Slideboard: NT slideboard w/c <> mat  SBA/Michelle   assist for removal and placement Min A SBA   Ambulation   NT 10 feet x5 reps inn parallel bars with ModA + WC follow (L knee block during stance, standing frame sling under bottom) N/T 25 feet with [employer] with Min A >75 feet with [employer] with SBA   Walking 10 feet on uneven surface NT NT N/T 10 feet with [employer] with Min A 10 feet with [employer] with SBA   Wheel Chair Mobility 100 feet  feet Supervision 225 feet x1 rep Supervision >200 feet Supervision >200 feet Independent   Car Transfers NT NT N/T Min A SBA   Stair negotiation: ascended and descended NT NT N/T 4 steps with B rail with Min A 4 steps with B rail with SBA   Curb Step:   ascended and descended NT NT N/T     Picking up object off the floor NT NT N/T BLE ROM WFL WFL      BLE Strength RLE 4/5  LLE 3+/5 RLE 4/5  LLE 3+/5      Balance  Sitting: SBA   Static stand: Max A x2 Sitting: Supervision  Static stand: Max A  Sitting [facility]     Date Family Teach Completed TBA Son was a no-show for family training twice      Is additional Family Teaching Needed? Y or N Y Y yes     Hindering Progress Debility, L hemiparesis Debility, L hemiparesis      PT recommended ELOS 4-5 weeks       Team's Discharge Plan        Therapist at Team Meeting          Therapeutic Exercise:   AM: Sit<>Stand transfer x 5 reps in parallel bars  PM: bridging 2x10, w/c mobility, LAQS 2x10    Patient education  Pt educated on conscious stabilization of L knee during stance phase to avoid buckling    Patient response to education:   Pt verbalized understanding Pt demonstrated skill Pt requires further education in this area   yes partial yes     Additional Comments: Gait training resumed in parallel bars. Pt exhibits L flexed knee during stance phase which limits effective RLE advancement and contributes to tendency of L knee to buckle. L knee stability and R step height/length significantly improved with L knee block and instruction for patient to consciously lock  L knee during stance. Pt able to consistently improve R step length/height with L knee block. Pt able to complete increased number of ambulation repetitions of the entire length of the parallel bars. Significant assistance still required to achieve standing position from seated position with verbal cueing for anterior lean and standing frame sling to manually assist with hip extension,  P.m. Worked on w/c mobility, sliding board transfers and bed mobility. AM  Time in: 0915  Time out: 1000    PM  Time in:1525  Time out: 1600    Pt is making good progress toward established Physical Therapy goals. Continue with physical therapy current plan of care.   Padilla Sanchez [id number]

## 2021-02-19 PROCEDURE — 97535 SELF CARE MNGMENT TRAINING: CPT

## 2021-02-19 PROCEDURE — 97110 THERAPEUTIC EXERCISES: CPT

## 2021-02-19 PROCEDURE — 97530 THERAPEUTIC ACTIVITIES: CPT

## 2021-02-19 PROCEDURE — 94640 AIRWAY INHALATION TREATMENT: CPT

## 2021-02-19 PROCEDURE — 6370000000 HC RX 637 (ALT 250 FOR IP): Performed by: PHYSICAL MEDICINE & REHABILITATION

## 2021-02-19 PROCEDURE — 92526 ORAL FUNCTION THERAPY: CPT

## 2021-02-19 PROCEDURE — 6370000000 HC RX 637 (ALT 250 FOR IP): Performed by: CLINICAL NURSE SPECIALIST

## 2021-02-19 PROCEDURE — 1280000000 HC REHAB R&B

## 2021-02-19 RX ADMIN — APIXABAN 5 MG: 5 TABLET, FILM COATED ORAL at 08:12

## 2021-02-19 RX ADMIN — ACETAMINOPHEN 650 MG: 325 TABLET, FILM COATED ORAL at 17:42

## 2021-02-19 RX ADMIN — ATORVASTATIN CALCIUM 20 MG: 20 TABLET, FILM COATED ORAL at 21:30

## 2021-02-19 RX ADMIN — RIFAXIMIN 550 MG: 550 TABLET ORAL at 08:11

## 2021-02-19 RX ADMIN — APIXABAN 5 MG: 5 TABLET, FILM COATED ORAL at 21:30

## 2021-02-19 RX ADMIN — POLYETHYLENE GLYCOL 3350 17 G: 17 POWDER, FOR SOLUTION ORAL at 17:42

## 2021-02-19 RX ADMIN — ACETAMINOPHEN 650 MG: 325 TABLET, FILM COATED ORAL at 08:12

## 2021-02-19 RX ADMIN — ACETAMINOPHEN 650 MG: 325 TABLET, FILM COATED ORAL at 21:30

## 2021-02-19 RX ADMIN — IPRATROPIUM BROMIDE AND ALBUTEROL SULFATE 1 AMPULE: 2.5; .5 SOLUTION RESPIRATORY (INHALATION) at 11:38

## 2021-02-19 RX ADMIN — Medication: at 21:30

## 2021-02-19 RX ADMIN — IPRATROPIUM BROMIDE AND ALBUTEROL SULFATE 1 AMPULE: 2.5; .5 SOLUTION RESPIRATORY (INHALATION) at 16:41

## 2021-02-19 RX ADMIN — GLYCOPYRROLATE 1 MG: 1 TABLET ORAL at 08:12

## 2021-02-19 RX ADMIN — METOPROLOL TARTRATE 25 MG: 25 TABLET, FILM COATED ORAL at 08:11

## 2021-02-19 RX ADMIN — Medication: at 08:13

## 2021-02-19 RX ADMIN — CHLORHEXIDINE GLUCONATE 0.12% ORAL RINSE 15 ML: 1.2 LIQUID ORAL at 08:12

## 2021-02-19 RX ADMIN — RIFAXIMIN 550 MG: 550 TABLET ORAL at 21:30

## 2021-02-19 RX ADMIN — PANTOPRAZOLE SODIUM 40 MG: 40 TABLET, DELAYED RELEASE ORAL at 06:16

## 2021-02-19 RX ADMIN — POLYVINYL ALCOHOL 1 DROP: 14 SOLUTION/ DROPS OPHTHALMIC at 08:12

## 2021-02-19 RX ADMIN — LACTULOSE 20 G: 20 SOLUTION ORAL at 21:30

## 2021-02-19 RX ADMIN — MINERAL OIL AND WHITE PETROLATUM: 150; 830 OINTMENT OPHTHALMIC at 21:30

## 2021-02-19 RX ADMIN — ASPIRIN 81 MG: 81 TABLET, CHEWABLE ORAL at 08:12

## 2021-02-19 RX ADMIN — METOPROLOL TARTRATE 25 MG: 25 TABLET, FILM COATED ORAL at 21:30

## 2021-02-19 RX ADMIN — IPRATROPIUM BROMIDE AND ALBUTEROL SULFATE 1 AMPULE: 2.5; .5 SOLUTION RESPIRATORY (INHALATION) at 19:30

## 2021-02-19 ASSESSMENT — PAIN DESCRIPTION - DESCRIPTORS
DESCRIPTORS: ACHING
DESCRIPTORS: ACHING

## 2021-02-19 ASSESSMENT — PAIN SCALES - GENERAL
PAINLEVEL_OUTOF10: 4
PAINLEVEL_OUTOF10: 3
PAINLEVEL_OUTOF10: 1
PAINLEVEL_OUTOF10: 0
PAINLEVEL_OUTOF10: 5

## 2021-02-19 ASSESSMENT — PAIN DESCRIPTION - LOCATION: LOCATION: HIP

## 2021-02-19 ASSESSMENT — PAIN DESCRIPTION - PROGRESSION: CLINICAL_PROGRESSION: NOT CHANGED

## 2021-02-19 ASSESSMENT — PAIN - FUNCTIONAL ASSESSMENT: PAIN_FUNCTIONAL_ASSESSMENT: ACTIVITIES ARE NOT PREVENTED

## 2021-02-19 ASSESSMENT — PAIN DESCRIPTION - ONSET
ONSET: GRADUAL
ONSET: ON-GOING

## 2021-02-19 NOTE — PROGRESS NOTES
Physical Therapy  Treatment Note  Evaluating Therapist: Donald Morgan DPT    NAME: Darwin Alegre  ROOM: 5735R  DIAGNOSIS: Acute metabolic encephalopathy  PRECAUTIONS: Falls, PEG tube, trach (capped), L hemiparesis, LE proprioceptive deficits  HPI: 76year old male admitted to [facility] on 11/18/20 with respiratory failure secondary to witnessed aspiration event at home - chronic dysphagia with feeding tube. Intubated twice at [facility] with subsequent trach placement. Admitted to [facility] for vent weaning. Transferred to ARU 2/1. Social:  Pt lives with his son in a trailer with 8 steps with B rails or a ramp to enter. Prior to admission pt ambulated with [employer] Mod I, however this information was obtained from an old note in his chart. It is currently believed that the pt was not independent and rather required assistance with all activities. Initial Evaluation  2/3/21 AM     PM    Short Term Goals Long Term Goals    Was pt agreeable to Eval/treatment? yes yes yes     Does pt have pain? L wrist and R shoulder pain L wrist and B shoulder pain L wrist and B shoulder pain     Bed Mobility  Rolling: SBA  Supine to sit: SBA  Sit to supine: SBA  Scooting: SBA Independent all aspects NT Supervision Independent   Transfers Sit to stand: Max A x2  Stand to sit: Max A x2  Stand pivot: NT  Slideboard: Max A Sit to stand: Max A  Stand to sit: Max A  Stand pivot: NT  Slideboard: SBA/Min A (assist for slideboard placement and removal) Sit to stand: Max A  Stand to sit:  Max A  Stand pivot: NT  Slideboard: SBA/Min A (assist for slideboard placement and removal) Min A SBA   Ambulation   NT 10 feet x2 reps in parallel bars and L check strap AFO with Mod A + WC follow  5 feet with wide standard walker and L check strap AFO with Max A + WC follow 10 feet x2 reps in parallel bars with Mod A + WC follow  25 feet with [employer] with Min A >75 feet with [employer] with SBA   Walking 10 feet on uneven surface NT NT NT 10 feet with [employer] with Min A 10 feet with [employer] with SBA   Wheel Chair Mobility 100 feet  feet Supervision 300 feet Supervision >200 feet Supervision >200 feet Independent   Car Transfers NT NT NT Min A SBA   Stair negotiation: ascended and descended NT NT NT 4 steps with B rail with Min A 4 steps with B rail with SBA   Curb Step:   ascended and descended NT NT NT     Picking up object off the floor NT NT NT     BLE ROM WFL WFL      BLE Strength RLE 4/5  LLE 3+/5 RLE 4/5  LLE 3+/5      Balance  Sitting: SBA   Static stand: Max A x2 Sitting: Independent  Static stand: Max A       Date Family Teach Completed TBA Son was a no-show for family training 3 times      Is additional Family Teaching Needed? Y or N Y Y      Hindering Progress Debility, L hemiparesis Debility, L hemiparesis      PT recommended ELOS 4-5 weeks       Team's Discharge Plan        Therapist at Team Meeting          Therapeutic Exercise:   AM:   - Slideboard transfer wheelchair<>mat table  PM:  - Standing mini squats in parallel bars x15 reps to improve BLE strength and emphasize establishing TKE  - In place side step outs in parallel bars, x10 with the LLE, attempted to perform with the RLE however pt was unable to  the R foot and step out to the side despite several efforts to do so    Additional Comments: Pt continues to have significant functional deficits as a result of the pt's debility and impaired strength levels. Trial L check strap AFO today to assist pt with locking his L knee and provide increased tactile stimulation. Pt 75% of the time still slides his R foot forward to advance it however at times the pt is able to  his R foot and advance it when a manual L knee block is provided from PT. Regardless of using an AFO or providing a manual knee block for the pt he still requires significant levels of assistance to ambulate especially with a walker. Pt still needs some assistance with slideboard transfers to ensure safe completion of the task.  Due to the increased amount of assistance required to stand and ambulate the pt it is in the pt's best interest to function at a wheelchair level at this time while ambulation will be included as a therapeutic activity versus the pt practicing towards being a functional ambulator. Pt's son was scheduled for family training in the morning however he did not show up for a third time. Will continue to work with pt on improving his function at a wheelchair level. Patient/family education  Pt/family educated on standing posture, ambulation mechanics in parallel bars, slideboard transfer technique/safety    Patient/family response to education:   Pt/family verbalized understanding Pt/family demonstrated skill Pt/family requires further education in this area   yes With cues/assist yes     AM  Time in: 0915  Time out: 1000  PM   Time in: 1515  Time out: 1600    Pt is making fair progress toward established Physical Therapy goals. Continue with physical therapy current plan of care.     Roseline Sewell, DPT [id number]

## 2021-02-19 NOTE — PROGRESS NOTES
mineral oil-hydrophil petrolat   Topical BID    glycopyrrolate  1 mg PEG Tube Daily    lubrifresh P.M. Left Eye Nightly    polyvinyl alcohol  1 drop Both Eyes 4x Daily    albuterol  2.5 mg Nebulization BID    apixaban  5 mg Per G Tube BID    aspirin  81 mg PEG Tube Daily    atorvastatin  20 mg Oral Nightly    chlorhexidine  15 mL Mouth/Throat BID    ipratropium-albuterol  1 ampule Inhalation Q4H WA    metoprolol tartrate  25 mg Oral BID    pantoprazole  40 mg Oral QAM AC    rifaximin  550 mg PEG Tube BID       Physical Exam:  General Appearance: appears comfortable in no acute distress. HEENT: Normocephalic atraumatic without obvious abnormality   Neck: Lips, mucosa, and tongue normal.  Supple, symmetrical, trachea midline, no adenopathy;thyroid:  no enlargement/tenderness/nodules or JVD. Lung: Breath sounds CTA. Respirations   unlabored. Symmetrical expansion. Heart: RRR, normal S1, S2. No MRG  Abdomen: Soft, NT, ND. BS present x 4 quadrants. No bruit or organomegaly. Extremities: Pedal pulses 2+ symmetric b/l. Extremities normal, no cyanosis, clubbing, or edema. Musculokeletal: No joint swelling, no muscle tenderness. ROM normal in all joints of extremities. Neurologic: Mental status: Alert and Oriented X3 .     Pertinent/ New Labs and Imaging Studies     Imaging Personally Reviewed:  2/1/2021    ECHO  None on file    Labs:  Lab Results   Component Value Date    WBC 2.7 02/17/2021    HGB 13.3 02/17/2021    HCT 42.3 02/17/2021    MCV 87.4 02/17/2021    MCH 27.5 02/17/2021    MCHC 31.4 02/17/2021    RDW 15.8 02/17/2021     02/17/2021    MPV 11.2 02/17/2021     Lab Results   Component Value Date     02/17/2021    K 4.5 02/17/2021    K 4.4 02/03/2021     02/17/2021    CO2 31 02/17/2021    BUN 26 02/17/2021    CREATININE 0.7 02/17/2021    LABALBU 3.2 02/17/2021    CALCIUM 9.3 02/17/2021    GFRAA >60 02/17/2021    LABGLOM >60 02/17/2021     Lab Results   Component Value Date PROTIME 19.7 12/03/2020    INR 1.7 12/03/2020     No results for input(s): PROBNP in the last 72 hours. No results for input(s): PROCAL in the last 72 hours. This SmartLink has not been configured with any valid records. Assessment:    1. Acute respiratory failure with hypoxia and hypercapnia-resolved  2. Hemoptysis post trach change 1/27/2021-resolved  3. Off vent since 1/6/2021  4. Aspiration pneumonia completed [place]. History of witnessed aspiration event at home  5. Leukopenia  6. Tracheostomy 1113 from outside hospital, downsized to 1/27/2020 1/26 Shisara cuffless, this is second time he has had a trach was trach after his CVA   7. Bilateral pleural effusions with compressive atelectasis status post thoracentesis 1.5 L fluid removed transudate on 12/2/20  8. History of CVA  9. Probable LEONARDO  10. COPD  6. CAD status post stents PCI 2018  12. A. fib on Eliquis  13. BPH  14. Cirrhosis  15. Bronchoscopy from outside hospital on 11/6/2020 with evidence of tracheobronchomalacia  16. Previous ABGs  chronic hypercapnia 1/18/2021      Plan:   1. Full trach capping monitor off oxygen keep greater than 92%.   2. 1/29 bronchoscopy for airway examination no obvious source of bleeding identified  3. Follow chest x-ray   4. Previous ABGs with chronic hypercapnia 1/18/2021  5. Completed tobramycin aerosols and cefepime for Pseudomonas tracheobronchitis  6.  baby aspirin and Eliquis resumed on 2/2/2021  7. Bronchodilator regimen albuterol twice daily, saline nebs 3% as needed. Robinul  daily for secretions. 8. Speech therapy-barium swallow 2/4/2021 aspiration, recommendation n.p.o., continue feeding with PEG  9. PT OT  10. Nutritional support tube feeds via PEG tube  11. Bowel regimen  12.  GI prophylaxis    Considering patient's history of trach x2, continued dysphagia, aspiration history and difficulty clearing secretions, multiple lung infections, failed swallow study and hypercapnia would not recommend decannulation at this time. This plan of care was reviewed in collaboration with Dr. Ale Katz  Electronically signed by PAYTON Villasenor on 2/19/2021 at 9:48 AM    I personally saw, examined, and cared for the patient. Labs, medications, radiographs reviewed. I agree with history exam and plans detailed in NP note.       Electronically signed by Angel Arreguin DO on 2/19/2021 at 5:36 PM

## 2021-02-19 NOTE — PROGRESS NOTES
Speech Language Pathology  ACUTE REHABILITATION--DAILY PROGRESS NOTE          ADMITTING DIAGNOSIS: Acute metabolic encephalopathy [F48.03]     SPEECH PATHOLOGY DIAGNOSIS:    Mild-moderate cognitive deficit. 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 Botox received in 2008 with short term success). Decreased intelligibility due to weakness, missing dentition, fast rate and resonance issues.     THERAPY RECOMMENDATIONS:   Speech Pathology intervention is recommended 3-6 times per week for LOS or when goals are met with emphasis on the following:      Improve STM recall, sequencing and problem solving for increased functional independence with completion of ADLs/IADLs to a FIM level commensurate with modified independent.                                                                                                                                         FIMS SCORES                            Swallowing                          Current Status             1--Total Assistance                   Short Term Goal         4--Minimal Assistance               Long Term Goal          5--Supervision      Receptive                          Current Status             5--Supervision               Short Term Goal         6--Modified Independent                       Long Term Goal          6--Modified Independent              Expressive                          Current Status             5--Supervision               Short Term Goal         6--Modified Independent                       Long Term Goal          6--Modified Independent              Social Interaction                          Current Status             6--Modified Independent                       Short Term Goal         6--Modified Independent                       Long Term Goal          6--Modified Independent                                                             Problem Solving                          Current Status            4--Minimal Assistance / 3--Moderate Assistance               Short Term Goal          4--Minimal Assistance              Long Term Goal            5--Supervision                        Memory                          Current Status            4--Minimal Assistance  / 3--Moderate Assistance                     Short Term Goal         4--Minimal Assistance   Long Term Goal          5--Supervision                                              SWALLOWING:      Diet:  NPO (nothing by mouth including oral meds). PEG tube in place. Chronic dysphagia reported but inconsistent information provided by patient. Lengthy case history taken by other SLP from patient and from pulmonary NP- see MBS report. Note from 2/4: Speech Pathologist (SLP) completed education with the patient/family regarding procedure of Modified Barium Swallow Study prior to exam and then type of swallowing impairment following completion of MBSS. Reviewed current solid/liquid consistency diet recommendations (NPO (nothing by mouth including oral meds). Discussed strategies trialed (as previously used by Pt) with poor outcome and continued aspiration. Images from MBSS reviewed with patient/ family and education provided. Reviewed aspiration precautions. Encouraged patient and/or family to engage SLP in unstructured Q&A session relative to identified deficit areas; indicated understanding of all information provided via satisfactory verbal response. Patient completed exercises for laryngeal and tongue base muscles with fair outcome. Patient benefited from cues to complete each exercise ten consecutive times. LANGUAGE:     Patient conversed easily with clinician and terminated conversation turns appropriately. COGNITION:        Patient benefited from cues when counting number of repetitions during swallowing exercises.     Patient had fair recall during conversation regarding famous singers and songs. Safety:  Fair-   Other therapies (PT, OT) report concerns with safety. SPEECH:     Difficult to understand at times due to facial weakness and absence of teeth. SP recommended after discharge: yes  Supervision recommended at discharge: 24 hour    Will continue SP intervention as per previously established POC.     Minute Tracking:    Individual therapy:    15 minutes  Concurrent therapy:    0  minutes  Group therapy:     0 minutes  Co-treatment therapy:    0 minutes    Total minutes for 2/19/2021: 15 minutes    Sol Patterson   SLP student intern

## 2021-02-19 NOTE — PROGRESS NOTES
Progress Note  Date:2021       [facility]  Patient Name:Isael Rachel     Date of Birth:     Age:76 y.o. Subjective    Subjective:  Symptoms:  Stable. He reports weakness. Diet:  NPO. Activity level: Impaired due to weakness. Pain:  He reports no pain. Review of Systems   Neurological: Positive for weakness. Objective         Vitals Last 24 Hours:  TEMPERATURE:  Temp  Av.6 °F (36.4 °C)  Min: 97.6 °F (36.4 °C)  Max: 97.6 °F (36.4 °C)  RESPIRATIONS RANGE: Resp  Av  Min: 18  Max: 18  PULSE OXIMETRY RANGE: SpO2  Av.7 %  Min: 95 %  Max: 98 %  PULSE RANGE: Pulse  Av  Min: 84  Max: 92  BLOOD PRESSURE RANGE: Systolic (59WUF), NTZ:742 , Min:118 , LUP:349   ; Diastolic (54XEH), CVI:21, Min:68, Max:70    I/O (24Hr): Intake/Output Summary (Last 24 hours) at 2021 0750  Last data filed at 2021 0629  Gross per 24 hour   Intake 2280 ml   Output 350 ml   Net 1930 ml     Objective:  General Appearance: In no acute distress. Vital signs: (most recent): Blood pressure 121/70, pulse 84, temperature 97.6 °F (36.4 °C), temperature source Oral, resp. rate 18, height 6' 3\" (1.905 m), weight 212 lb 8 oz (96.4 kg), SpO2 98 %. Vital signs are normal.    Output: Producing urine and producing stool. HEENT: (Trach)    Lungs:  Normal effort and normal respiratory rate. Breath sounds clear to auscultation. Heart: Normal rate. Regular rhythm. S1 normal and S2 normal.    Abdomen: Abdomen is soft. Bowel sounds are normal.   There is no abdominal tenderness. Extremities: Decreased range of motion. Neurological: Patient is alert. (4/5 strength).       Labs/Imaging/Diagnostics    Labs:  CBC:  Recent Labs     21  0614   WBC 2.7*   RBC 4.84   HGB 13.3   HCT 42.3   MCV 87.4   RDW 15.8*        CHEMISTRIES:  Recent Labs     21  0614      K 4.5      CO2 31*   BUN 26*   CREATININE 0.7   GLUCOSE 108*     PT/INR:No results for input(s): PROTIME, INR in the last 72 hours. APTT:No results for input(s): APTT in the last 72 hours. LIVER PROFILE:  Recent Labs     02/17/21  0614   AST 23   ALT 18   BILITOT 0.9   ALKPHOS 104       Imaging Last 24 Hours:  No results found. Assessment//Plan           Hospital Problems           Last Modified POA    Acute metabolic encephalopathy 4/7/6738 Yes    Severe protein-calorie malnutrition (HCC) (Chronic) 2/4/2021 Yes          Initial Evaluation  2/3/21 AM     PM    Short Term Goals Long Term Goals    Was pt agreeable to Eval/treatment? yes yes yes       Does pt have pain? L wrist and R shoulder pain L wrist and B shoulder pain No c/o       Bed Mobility  Rolling: SBA  Supine to sit: SBA  Sit to supine: SBA  Scooting: SBA Supervision all aspects Supervision  All aspects Supervision Independent   Transfers Sit to stand: Max A x2  Stand to sit: Max A x2  Stand pivot: NT  Slideboard: Max A Sit to stand: Max A  Stand to sit: Max A  Stand pivot: NT  Slideboard: NT slideboard w/c <> mat  SBA/Michelle   assist for removal and placement Min A SBA   Ambulation   NT 10 feet x5 reps inn parallel bars with ModA + WC follow (L knee block during stance, standing frame sling under bottom) N/T 25 feet with [employer] with Min A >75 feet with [employer] with SBA   Walking 10 feet on uneven surface NT NT N/T 10 feet with [employer] with Min A 10 feet with [employer] with SBA   Wheel Chair Mobility 100 feet  feet Supervision 225 feet x1 rep Supervision >200 feet Supervision >200 feet Independent   Car Transfers NT NT N/T Min A SBA   Stair negotiation: ascended and descended NT NT N/T 4 steps with B rail with Min A 4 steps with B rail with SBA   Curb Step:   ascended and descended                 Assessment:    Condition: In stable condition. Improving. (Metabolic encephalopathy). Plan:   Encourage ambulation.   (Slowly improving in therapy  Still not really able to do any ambulation  We are leaving the trach and the PEG tube in place  We will get a be teaching the family trach tube care and tube feedings  He is tolerating the bolus tube feedings well).        Electronically signed by Sai Umanzor MD on 2/19/21 at 7:50 AM EST

## 2021-02-19 NOTE — CARE COORDINATION
Pt's son- Penelope Hernandez either cancelled FT or was a no show on all scheduled FT's this week. 2/16 - cancelled- bad weather  2/17-  Called- had to take his grandmother in ER  2/19- no show. -  \"had to  his grandmother hospital\"  ** Rescheduled 2/22 PM.    SW called and left him a message emphasizing the importance of FT especially for trach care. Await return call.     Wendie Muniz  2/19/2021

## 2021-02-19 NOTE — PROGRESS NOTES
OCCUPATIONAL THERAPY DAILY NOTE    Date:2021  Patient Name: Lalo Pereira  MRN: [id number]  : 1945  Room: [address] [place], [place] [address]     Diagnosis: RF secondary to aspiration @ home, trach & peg  Additional Pertinent Hx: CHF, COPD, hx CVA, dysphagia, peg heart disease  Precautions: Falls, NPO, Peg    Functional Assessment:   Date Status AE  Comments   Feeding 21 NPO     Grooming 21 SBA seated          Oral Care 21 SBA/S seated    Bathing 21 Mod A Seated    UB Dressing 21 SBA/S     LB Dressing 21 Mod A     Footwear 21 Min/Mod A Seated  Pt demo ability to crossover BLE's to remove and adamaris slipper along with adjusting gripper sock while up in w/c at CGA/min A    Toileting 2/3/21 Max A Bed level     Homemaking 21 SBA Laundry      Functional Transfers / Balance:   Date Status DME  Comments   Sit Balance 21 SBA  Seated at EOB. Dyn/static sitting balance using reacher to  bean bags off floor then placing into sidelined table. Pt used reacher to remove & place back on plastic rings on/off dowel shane pole to improve A.E use along with facilitating trunk control/core strengthening for functional activities/ ADL's    Pt completed trunk lifts/push ups using BUE/BLE's needing assistance to elevate off w/c seat. Stand Balance 21 Mod A x2     [] Tub  [] Shower   Transfer  TBA-  Pt sponge bathed at EOB PTA. Commode   Transfer 21 Mod A Bariatric 3-in-1, transfer board    Functional   Mobility 21 SBA/S W/c Completed within OT clinic with fair ability maneuver around minimal environmental obstacles. Other:  Bed>W/c      Supine to EOB   21   Min A      SBA   Transfer board    Bed rail    Assist for board placement and for safety during transfer. Functional Exercises / Activity  -Nut/bolt ladder completed seated at table with focus on increasing B UE AROM and [facility] for functional activities. Increased time required to complete.  [address] tolerance. Pt engaged LB dressing seated in w/c using BLE crossover method to adamaris/doff slipper and readjust gripper socks to increase ADL's needing CGA/min A. Pt engaged in trunk control/core strengthening ex's using reacher to  bean bags off floor then remove and place plastic rings off dowel shane pole. Pt completed bean bag toss into bucket using BUE's to increase overall strength/endurance for ADL's, mobility and transfers x2 reps. Sensory / Neuromuscular Re-Education:      Cognitive Skills:   Status Comments   Problem   Solving fair  During therapeutic activities and LB dressing/reacher training. Memory fair  \"   Sequencing fair  \"   Safety fair - Min cues for safety awareness/insight required. Visual Perception:    Education:  Pt educated on safety upon discharge and fall prevention. Discussed home setup and adaptive techniques/EC techniques for discharge. Pt verbalized/demonstrated fair understanding, recommend continued education. [] Family teach completed on: 2/17/21: No family present for scheduled FT session this date. 2/19/21: No family present for scheduled FT this AM.    Pain Level: No c/o pain. Additional Notes:    2/15/21: Dynamometer:R: 34        L: 10       9 Hole Peg: R: 49 Sec         L: 35 Sec     Patient has made fair  progress during treatment sessions toward set goals.  Therapy emphasis to obtain goals:  Current Treatment Recommendations: Strengthening, Gait Training, Patient/Caregiver Education & Training, Home Management Training, ROM, Equipment Evaluation, Education, & procurement, Balance Training, Functional Mobility Training, Endurance Training, Wheelchair Mobility Training, Safety Education & Training, Self-Care / ADL      Long term goal 2: Pt demo Mod I grooiming seated  Long term goal 3: Pt demo Min A bathing @ sink level or shower level seated  Long term goal 4: Pt demo s/u UE & Min A LE dress to don depends, pants, socks & shoes using AE as needed  Long term goal 5: Pt demo Min-Mod  A commode trf wtih appropriate DME to ensure pt safety  Long term goals 6: Pt demo Min-Mod A tub trf with appropriate DME to ensure pt safety  Long term goal 7: Pt demo Sup light homemaking activity @ wc level  Long term goal 8: Pt demo G- endurance for a 20 minute funcitonal activity  Long term goal 9: Pt demo increase BUE strength to 4+/5 to improve pt ability to complete ADLs  Long term goal 10: Pt demo Mod I wc propulsion throughout a living environment & around obstacles    2/16/21: Pt plan of care updated on this date. Family teach scheduled for 2/19/21. Pt tentative length of stay is 2 weeks. [x] Continue with current OT Plan of care.   [] Prepare for Discharge     DISCHARGE RECOMMENDATIONS  Recommended DME: transfer board, bariatric 3-in-1    Post Discharge Care:   []Home Independently  [x]Home with 24hr Care / Supervision []Home with Partial Supervision []Home with Home Health OT []Home with Out Pt OT []Other: ___   Comments:         Time in Time out Tx Time Breakdown  Variance:   First Session  0830 0915 [x] Individual  tx -45 Mins   [] Concurrent Tx-   [] Co-Tx -   [] Group Tx -   [] Time Missed -     Second Session 10:45am 11:30am [x] Individual Tx-45  [] Concurrent Tx -  [] Co-Tx -   [] Group Tx -   [] Time Missed -     Third Session    [] Individual Tx-   [] Concurrent Tx -  [] Co-Tx -   [] Group Tx -   [] Time Missed -         Total Tx Time:90 Mins     Gary ABDULLAHI/L [place] [address] [place] [address]

## 2021-02-20 PROCEDURE — 97530 THERAPEUTIC ACTIVITIES: CPT

## 2021-02-20 PROCEDURE — 1280000000 HC REHAB R&B

## 2021-02-20 PROCEDURE — 6370000000 HC RX 637 (ALT 250 FOR IP): Performed by: PHYSICAL MEDICINE & REHABILITATION

## 2021-02-20 PROCEDURE — 94640 AIRWAY INHALATION TREATMENT: CPT

## 2021-02-20 PROCEDURE — 6370000000 HC RX 637 (ALT 250 FOR IP): Performed by: CLINICAL NURSE SPECIALIST

## 2021-02-20 PROCEDURE — 92507 TX SP LANG VOICE COMM INDIV: CPT | Performed by: SPEECH-LANGUAGE PATHOLOGIST

## 2021-02-20 PROCEDURE — 93005 ELECTROCARDIOGRAM TRACING: CPT | Performed by: PHYSICAL MEDICINE & REHABILITATION

## 2021-02-20 RX ADMIN — APIXABAN 5 MG: 5 TABLET, FILM COATED ORAL at 20:37

## 2021-02-20 RX ADMIN — MINERAL OIL AND WHITE PETROLATUM: 150; 830 OINTMENT OPHTHALMIC at 20:37

## 2021-02-20 RX ADMIN — Medication: at 08:20

## 2021-02-20 RX ADMIN — APIXABAN 5 MG: 5 TABLET, FILM COATED ORAL at 08:19

## 2021-02-20 RX ADMIN — Medication: at 17:19

## 2021-02-20 RX ADMIN — GLYCOPYRROLATE 1 MG: 1 TABLET ORAL at 08:19

## 2021-02-20 RX ADMIN — Medication: at 22:18

## 2021-02-20 RX ADMIN — ACETAMINOPHEN 650 MG: 325 TABLET ORAL at 21:00

## 2021-02-20 RX ADMIN — PANTOPRAZOLE SODIUM 40 MG: 40 TABLET, DELAYED RELEASE ORAL at 05:50

## 2021-02-20 RX ADMIN — IPRATROPIUM BROMIDE AND ALBUTEROL SULFATE 1 AMPULE: 2.5; .5 SOLUTION RESPIRATORY (INHALATION) at 15:54

## 2021-02-20 RX ADMIN — RIFAXIMIN 550 MG: 550 TABLET ORAL at 08:19

## 2021-02-20 RX ADMIN — ATORVASTATIN CALCIUM 20 MG: 20 TABLET, FILM COATED ORAL at 20:36

## 2021-02-20 RX ADMIN — ASPIRIN 81 MG: 81 TABLET, CHEWABLE ORAL at 08:18

## 2021-02-20 RX ADMIN — IPRATROPIUM BROMIDE AND ALBUTEROL SULFATE 1 AMPULE: 2.5; .5 SOLUTION RESPIRATORY (INHALATION) at 21:38

## 2021-02-20 RX ADMIN — DOCUSATE SODIUM 283 MG: 283 LIQUID RECTAL at 12:07

## 2021-02-20 RX ADMIN — RIFAXIMIN 550 MG: 550 TABLET ORAL at 20:36

## 2021-02-20 RX ADMIN — METOPROLOL TARTRATE 25 MG: 25 TABLET, FILM COATED ORAL at 08:19

## 2021-02-20 RX ADMIN — METOPROLOL TARTRATE 25 MG: 25 TABLET, FILM COATED ORAL at 20:36

## 2021-02-20 ASSESSMENT — PAIN SCALES - GENERAL
PAINLEVEL_OUTOF10: 0
PAINLEVEL_OUTOF10: 4
PAINLEVEL_OUTOF10: 0
PAINLEVEL_OUTOF10: 0

## 2021-02-20 ASSESSMENT — PAIN DESCRIPTION - ORIENTATION: ORIENTATION: RIGHT

## 2021-02-20 ASSESSMENT — PAIN DESCRIPTION - DESCRIPTORS: DESCRIPTORS: ACHING;DISCOMFORT

## 2021-02-20 ASSESSMENT — PAIN DESCRIPTION - PAIN TYPE: TYPE: CHRONIC PAIN

## 2021-02-20 NOTE — FLOWSHEET NOTE
02/20/21 1311   Stool Assessment   Incontinence No   Stool Appearance Loose   Stool Color Brown   Stool Amount Small   Stool Source Rectum   Last BM (including prior to admit) 02/20/21   Unmeasured Output   Stool Occurrence 1   Staff needed to use the jae lift to get patient off of toilet, x4 staff members.

## 2021-02-20 NOTE — PROGRESS NOTES
Speech Language Pathology  ACUTE REHABILITATION--DAILY PROGRESS NOTE                                                                            ADMITTING DIAGNOSIS: Acute metabolic encephalopathy [C96.68]     SPEECH PATHOLOGY DIAGNOSIS:    Mild-moderate cognitive deficit. 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 Botox received in 2008 with short term success).  Decreased intelligibility due to weakness, missing dentition, fast rate and resonance issues.     THERAPY RECOMMENDATIONS:   Speech Pathology intervention is recommended 3-6 times per week for LOS or when goals are met with emphasis on the following:      Improve STM recall, sequencing and problem solving for increased functional independence with completion of ADLs/IADLs to a FIM level commensurate with modified independent.                                                                                                                                        FIMS SCORES                            Swallowing                          Current Status             1--Total Assistance                   Short Term Goal         4--Minimal Assistance               Long Term Goal          5--Supervision      Receptive                          Current Status             5--Supervision               Short Term Goal         6--Modified Independent                       Long Term Goal          6--Modified Independent              Expressive                          Current Status             5--Supervision               Short Term Goal         6--Modified Independent                       Long Term Goal          6--Modified Independent              Social Interaction                          DQTSCCP Status             6--Modified Independent                       Short Term Goal         6--Modified Independent                       Long Term Goal          6--Modified Jon Deep                                                    Problem Solving                          HSJYGNM Status            4--Minimal Assistance / 3--Moderate Assistance               Short Term Goal          4--Minimal Assistance              Long Term Goal            5--Supervision                        Memory                          Current Status            4--Minimal Assistance  / 3--Moderate Assistance                     Short Term Goal         4--Minimal Assistance   Long Term Goal          5--Supervision                                                                                                                                                                                                                    SWALLOWING:                         Diet:  NPO (nothing by mouth including oral meds). PEG tube in place. Chronic dysphagia reported but inconsistent information provided by patient. Lengthy case history taken by other SLP from patient and from pulmonary NP- see MBS report.      Note from 2/4: Speech Pathologist (SLP) completed education with the patient/family regarding procedure of Modified Barium Swallow Study prior to exam and then type of swallowing impairment following completion of MBSS. Reviewed current solid/liquid consistency diet recommendations (NPO (nothing by mouth including oral meds). Discussed strategies trialed (as previously used by Pt) with poor outcome and continued aspiration. Images from MBSS reviewed with patient/ family and education provided. Reviewed aspiration precautions.  Encouraged patient and/or family to engage SLP in unstructured Q&A session relative to identified deficit areas; indicated understanding of all information provided via satisfactory verbal response.     LANGUAGE:                 Patient conversed easily with clinician and terminated conversation turns appropriately.                      COGNITION:                        Patient was able to display appropriate thought organization during speech task.                  Safety:  Fair-              Other therapies (PT, OT) report concerns with safety.      SPEECH:                 Difficult to understand at times due to facial weakness and absence of teeth. We worked on [employer] during question generation task and conversational task with patient achieving fair performance. He needed mod cues to slow rate and exaggerate speech sounds to improve intelligibility.       SP recommended after discharge: yes  Supervision recommended at discharge: 24 hour     Will continue SP intervention as per previously established POC.     Minute Tracking:     Individual therapy:                    0 minutes  Concurrent therapy:                  30  minutes  Group therapy:                          0 minutes  Co-treatment therapy:               0 minutes     Total minutes for 2/20/2021:   30 minutes    Gabriel Herron.  Edna Mckenzie MA/CCC-SLP  [id number]    [facility] [id number]

## 2021-02-20 NOTE — PROGRESS NOTES
Physical Therapy  Treatment Note  Evaluating Therapist: Nga Fields DPT    NAME: Richard Alvarez  ROOM: [id number]  DIAGNOSIS: Acute metabolic encephalopathy  PRECAUTIONS: Falls, PEG tube, trach (capped), L hemiparesis, LE proprioceptive deficits  HPI: 76year old male admitted to [facility] on 11/18/20 with respiratory failure secondary to witnessed aspiration event at home - chronic dysphagia with feeding tube. Intubated twice at [facility] with subsequent trach placement. Admitted to [facility] for vent weaning. Transferred to ARU 2/1. Social:  Pt lives with his son in a trailer with 8 steps with B rails or a ramp to enter. Prior to admission pt ambulated with [facility] Mod I, however this information was obtained from an old note in his chart. It is currently believed that the pt was not independent and rather required assistance with all activities. Initial Evaluation  2/3/21 AM     PM    Short Term Goals Long Term Goals    Was pt agreeable to Eval/treatment? yes yes NT     Does pt have pain? L wrist and R shoulder pain L wrist and B shoulder pain; R \"hip pointer\"      Bed Mobility  Rolling: SBA  Supine to sit: SBA  Sit to supine: SBA  Scooting: SBA Independent all aspects  Supervision Independent   Transfers Sit to stand: Max A x2  Stand to sit: Max A x2  Stand pivot: NT  Slideboard: Max A Sit to stand: Max A  Stand to sit:  Max A  Stand pivot: NT  Slideboard: Min A  Min A SBA   Ambulation   NT 10 feet in parallel bars with Mod A + WC follow  25 feet with [facility] with Min A >75 feet with [facility] with SBA   Walking 10 feet on uneven surface NT NT  10 feet with [facility] with Min A 10 feet with [facility] with SBA   Wheel Chair Mobility 100 feet  feet Supervision  >200 feet Supervision >200 feet Independent   Car Transfers NT NT  Min A SBA   Stair negotiation: ascended and descended NT NT  4 steps with B rail with Min A 4 steps with B rail with SBA   Curb Step:   ascended and descended NT NT      Picking up object off the floor NT NT      BLE ROM [facility] WFL      BLE Strength RLE 4/5  LLE 3+/5 RLE 4/5  LLE 3+/5      Balance  Sitting: SBA   Static stand: Max A x2 Sitting: Independent  Static stand: Max A       Date Family Teach Completed TBA Son was a no-show for family training 3 times      Is additional Family Teaching Needed? Y or N Y Y      Hindering Progress Debility, L hemiparesis Debility, L hemiparesis      PT recommended ELOS 4-5 weeks       Team's Discharge Plan        Therapist at Team Meeting          Therapeutic Exercise:   AM:   - Slideboard transfer bed to chair  - Standing knee bends x15 reps in parallel bars to emphasize establishing TKE during standing  - Static stand hold x2 minutes for 1 rep and x30 seconds for 1 rep in parallel bars to increase pt's endurance and maximize the amount of time pt is upright and weight bearing on his legs    Additional Comments: Pt continues to have significant functional deficits as a result of the pt's debility and impaired strength levels. Pt noted to have more difficulty with slideboard transfers and sit<>stand transfers today than previous sessions. For pt's session today the focus was on maximizing the amount of time pt was upright on his feet to promote weight bearing and improve his functional leg strength. During ambulation pt continues to drag his R foot to advance it despite a heavy L knee block on the pt. Pt also continues to ambulate with a forward flexed posture with B flexed knees. Future PT sessions will continue to emphasize improving the pt's ability to function at a wheelchair level along with ambulating in the parallel bars to promote weight bearing and improve the pt's global strength.      Patient/family education  Pt/family educated on gait in parallel bars, standing posture, slideboard technique     Patient/family response to education:   Pt/family verbalized understanding Pt/family demonstrated skill Pt/family requires further education in this area   yes With cues/assist yes     AM  Time in: 0900  Time out: 0930    Pt is making fair progress toward established Physical Therapy goals. Continue with physical therapy current plan of care.     Janet Pelletier DPT [id number]

## 2021-02-20 NOTE — FLOWSHEET NOTE
02/20/21 1221   Mobility   Activity Up to chair   Level of Assistance Dependent, patient does less than 25%   Assistive Device Wheelchair; Other (Comment)  (Slideboard. )   Patient very difficult to transfer with slideboard. Patient could not help at all, unsafe transfer to toilet in restroom. Patient would benefit from droparm [facility] in room. RN notified.

## 2021-02-20 NOTE — PROGRESS NOTES
[facility]  Comprehensive Progress Note    GENERAL:   Covering for Dr Amisha Green. 68 y old M, with history of CAD, A.FIB, and COPD, who was admitted to the ARU on 2/2/2021, transferred from [facility].  The patient was at [facility], following a CVA, and developed pneumonia and respiratory failure. He was ventilator dependent for a long time and required a tracheostomy and PEG tube insertion. He was transferred to [facility] on 1/18/2021. Resting in bed, reports constipation since last 2/14, on Lactulose 20 gm BID + Glycolax PRN which he took yesterday. He is also on Rifaximin and Robinul. VITALS:   Vitals:    02/19/21 0645 02/19/21 1139 02/19/21 2130 02/20/21 0725   BP: 121/70  114/66 134/81   Pulse: 84  101 95   Resp: 18   18   Temp: 97.6 °F (36.4 °C)   97.3 °F (36.3 °C)   TempSrc: Oral   Oral   SpO2: 98% 97%  95%   Weight:       Height:           MEDICATIONS:  Scheduled Meds:   lactulose  20 g Oral BID    mineral oil-hydrophil petrolat   Topical BID    glycopyrrolate  1 mg PEG Tube Daily    lubrifresh P.M.    Left Eye Nightly    albuterol  2.5 mg Nebulization BID    apixaban  5 mg Per G Tube BID    aspirin  81 mg PEG Tube Daily    atorvastatin  20 mg Oral Nightly    chlorhexidine  15 mL Mouth/Throat BID    ipratropium-albuterol  1 ampule Inhalation Q4H WA    metoprolol tartrate  25 mg Oral BID    pantoprazole  40 mg Oral QAM AC    rifaximin  550 mg PEG Tube BID     Continuous Infusions:  PRN Meds:.mineral oil-hydrophil petrolat **AND** mineral oil-hydrophil petrolat, docusate sodium, acetaminophen, ipratropium-albuterol, artificial tears, acetaminophen, polyethylene glycol     LABS:  CBC:   Lab Results   Component Value Date    WBC 2.7 02/17/2021    RBC 4.84 02/17/2021    HGB 13.3 02/17/2021    HCT 42.3 02/17/2021    MCV 87.4 02/17/2021    MCH 27.5 02/17/2021    MCHC 31.4 02/17/2021    RDW 15.8 02/17/2021     02/17/2021    MPV 11.2 02/17/2021     CMP:    Lab Results   Component Value Date     02/17/2021    K 4.5 02/17/2021    K 4.4 02/03/2021     02/17/2021    CO2 31 02/17/2021    BUN 26 02/17/2021    CREATININE 0.7 02/17/2021    GFRAA >60 02/17/2021    LABGLOM >60 02/17/2021    GLUCOSE 108 02/17/2021    PROT 6.6 02/17/2021    LABALBU 3.2 02/17/2021    CALCIUM 9.3 02/17/2021    BILITOT 0.9 02/17/2021    ALKPHOS 104 02/17/2021    AST 23 02/17/2021    ALT 18 02/17/2021       OBJECTIVE:    General:  Alert, oriented, in NAD. Edentulous Upper Jaw. HENT:   Wears Trifocals, with prism lens, for Diploplia. EOMI, BAR, Fundi deferred. Neck:   Supple  Heart:   Irregularly irregular. Chest:   Clear to auscultation. Abdomen:  Soft, non tender with 1 - 2 + BS. Neurological:  Cranial Nerves:  WNL. U.EX. strength:            Right:    3+/5, except right shoulder due to limited ROM from previous Rotator Cuff Surgery. With a hand  of 34 lbs. Left:     3/5. With a hand  of  10 lbs. L.EX. Strength:            Right:  4/5. Left:   3/5. Musculo-skeletal: Bilateral [place] and Carlo nodes. Decrease ROM of the right shoulder due to previous rotator cuff surgery. FUNCTIONAL STATUS:     Cognition:   MIN to MOD Deficits. Speech:  Mild Dysarthria, long standing from previous CVAs in 2008 and 2019. ADLs and Self Care: MOD to MIN A. Bed Mobility:  Independent. Transfers:   MAX A. Sliding Board:  MIN A. Gait:     10' in // bars with MOD A, WC following. WC Mobility:              300' with SUP. Stairs:    TBE.    ROM:     PLAN:    1.) Discontinue Robinul. 2.) Discontinue Glycolax PRN. 3.) Give Senokot at hs.  4.) OT to update MMT of U.EX. and hand  strength.  5.) 12 leads EKG.   6.) Continue Rehab Program.

## 2021-02-20 NOTE — FLOWSHEET NOTE
02/20/21 1221   Gastrointestinal   GI Symptoms Constipation   Relieved By Other (Comment)  Brisa Brenner given, patient up to restroom. )

## 2021-02-21 LAB
EKG ATRIAL RATE: 416 BPM
EKG Q-T INTERVAL: 334 MS
EKG QRS DURATION: 74 MS
EKG QTC CALCULATION (BAZETT): 417 MS
EKG R AXIS: -9 DEGREES
EKG T AXIS: 2 DEGREES
EKG VENTRICULAR RATE: 94 BPM

## 2021-02-21 PROCEDURE — 97110 THERAPEUTIC EXERCISES: CPT

## 2021-02-21 PROCEDURE — 97535 SELF CARE MNGMENT TRAINING: CPT

## 2021-02-21 PROCEDURE — 93010 ELECTROCARDIOGRAM REPORT: CPT | Performed by: INTERNAL MEDICINE

## 2021-02-21 PROCEDURE — 6370000000 HC RX 637 (ALT 250 FOR IP): Performed by: PHYSICAL MEDICINE & REHABILITATION

## 2021-02-21 PROCEDURE — 94640 AIRWAY INHALATION TREATMENT: CPT

## 2021-02-21 PROCEDURE — 1280000000 HC REHAB R&B

## 2021-02-21 RX ORDER — LACTULOSE 10 G/15ML
20 SOLUTION ORAL 3 TIMES DAILY
Status: DISCONTINUED | OUTPATIENT
Start: 2021-02-21 | End: 2021-02-26 | Stop reason: HOSPADM

## 2021-02-21 RX ADMIN — METOPROLOL TARTRATE 25 MG: 25 TABLET, FILM COATED ORAL at 08:53

## 2021-02-21 RX ADMIN — RIFAXIMIN 550 MG: 550 TABLET ORAL at 20:29

## 2021-02-21 RX ADMIN — APIXABAN 5 MG: 5 TABLET, FILM COATED ORAL at 20:30

## 2021-02-21 RX ADMIN — METOPROLOL TARTRATE 25 MG: 25 TABLET, FILM COATED ORAL at 20:30

## 2021-02-21 RX ADMIN — ASPIRIN 81 MG: 81 TABLET, CHEWABLE ORAL at 08:53

## 2021-02-21 RX ADMIN — APIXABAN 5 MG: 5 TABLET, FILM COATED ORAL at 08:53

## 2021-02-21 RX ADMIN — IPRATROPIUM BROMIDE AND ALBUTEROL SULFATE 1 AMPULE: 2.5; .5 SOLUTION RESPIRATORY (INHALATION) at 15:45

## 2021-02-21 RX ADMIN — MINERAL OIL AND WHITE PETROLATUM: 150; 830 OINTMENT OPHTHALMIC at 20:29

## 2021-02-21 RX ADMIN — IPRATROPIUM BROMIDE AND ALBUTEROL SULFATE 1 AMPULE: 2.5; .5 SOLUTION RESPIRATORY (INHALATION) at 19:46

## 2021-02-21 RX ADMIN — LACTULOSE 20 G: 20 SOLUTION ORAL at 08:52

## 2021-02-21 RX ADMIN — IPRATROPIUM BROMIDE AND ALBUTEROL SULFATE 1 AMPULE: 2.5; .5 SOLUTION RESPIRATORY (INHALATION) at 13:47

## 2021-02-21 RX ADMIN — Medication: at 20:50

## 2021-02-21 RX ADMIN — IPRATROPIUM BROMIDE AND ALBUTEROL SULFATE 1 AMPULE: 2.5; .5 SOLUTION RESPIRATORY (INHALATION) at 11:23

## 2021-02-21 RX ADMIN — RIFAXIMIN 550 MG: 550 TABLET ORAL at 08:53

## 2021-02-21 RX ADMIN — ACETAMINOPHEN 650 MG: 325 TABLET, FILM COATED ORAL at 20:30

## 2021-02-21 RX ADMIN — ATORVASTATIN CALCIUM 20 MG: 20 TABLET, FILM COATED ORAL at 20:30

## 2021-02-21 ASSESSMENT — PAIN SCALES - GENERAL: PAINLEVEL_OUTOF10: 0

## 2021-02-21 NOTE — PROGRESS NOTES
OCCUPATIONAL THERAPY DAILY NOTE    Date:2021  Patient Name: Lalo Pereira  MRN: [id number]  : 1945  Room: [address] [place], [place] [address]     Diagnosis: RF secondary to aspiration @ home, trach & peg  Additional Pertinent Hx: CHF, COPD, hx CVA, dysphagia, peg heart disease  Precautions: Falls, NPO, Peg    Functional Assessment:   Date Status AE  Comments   Feeding 21 NPO     Grooming 21 SBA/S seated Washing/drying face seated in w/c. Oral Care 21 SBA/S seated    Bathing 21 Mod A Seated    UB Dressing 21 SBA/S     LB Dressing 21 Mod A     Footwear 21 Min/Mod A Seated  To don slippers and socks seated in chair. Pt required assist to don R slipper this date. Pt utilizes leg cross over technique to complete. Toileting 2/3/21 Max A Bed level     Homemaking 21 SBA  Pt completed simple functional item retrieval activity in OT kitchen with focus on item retrieval from upper/lower cabinets utilizing reacher as needed with min cues for safety and adaptive techniques required with fair follow through. Min cues for techniques for w/c maneuverability within smaller kitchen space provided with fair follow through. Functional Transfers / Balance:   Date Status DME  Comments   Sit Balance 21 SBA  Unsupported sitting in w/c during dynamic ADL tasks. Stand Balance 21 Mod A x2     [] Tub  [] Shower   Transfer  TBA-  Pt sponge bathed at EOB PTA. Commode   Transfer 21 Mod A Bariatric drop arm 3-in-1, transfer board    Functional   Mobility 21 SBA/S W/c Completed throughout OT kitchen and rehab unit with focus on safely maneuvering in small spaces and around environmental obstacles. Min cues for safe functional maneuverability required with fair carryover.    Other:  Bed>W/c      Supine to EOB   21   Min A      SBA   Transfer board    Bed rail      Functional Exercises / Activity  -5# Sanderbox completed x30 in all planes with focus on increasing B UE strength and overall endurance for functional activities. Fair tolerance. Sensory / Neuromuscular Re-Education:      Cognitive Skills:   Status Comments   Problem   Solving fair  During therapeutic activities and LB dressing/reacher training. Memory fair  \"   Sequencing fair  \"   Safety fair - Min cues for safety awareness/insight required. Visual Perception:    Education:  Pt educated on functional item retrieval techniques and w/c maneuverability in small spaces. Pt verbalized/demonstrated fair understanding, recommend continued education. [] Family teach completed on: 2/17/21: No family present for scheduled FT session this date. 2/19/21: No family present for scheduled FT this AM.    Pain Level: No c/o pain. Additional Notes:    2/15/21: Dynamometer:R: 34        L: 10       9 Hole Peg: R: 49 Sec         L: 35 Sec       Patient has made fair  progress during treatment sessions toward set goals.  Therapy emphasis to obtain goals:  Current Treatment Recommendations: Strengthening, Gait Training, Patient/Caregiver Education & Training, Home Management Training, ROM, Equipment Evaluation, Education, & procurement, Balance Training, Functional Mobility Training, Endurance Training, Wheelchair Mobility Training, Safety Education & Training, Self-Care / ADL      Long term goal 2: Pt demo Mod I grooiming seated  Long term goal 3: Pt demo Min A bathing @ sink level or shower level seated  Long term goal 4: Pt demo s/u UE & Min A LE dress to don depends, pants, socks & shoes using AE as needed  Long term goal 5: Pt demo Min-Mod  A commode trf wtih appropriate DME to ensure pt safety  Long term goals 6: Pt demo Min-Mod A tub trf with appropriate DME to ensure pt safety  Long term goal 7: Pt demo Sup light homemaking activity @ wc level  Long term goal 8: Pt demo G- endurance for a 20 minute funcitonal activity  Long term goal 9: Pt demo increase BUE strength to 4+/5 to improve pt ability to complete ADLs  Long term goal 10: Pt demo Mod I wc propulsion throughout a living environment & around obstacles    2/16/21: Pt plan of care updated on this date. Family teach scheduled for 2/19/21. Pt tentative length of stay is 2 weeks. [x] Continue with current OT Plan of care.   [] Prepare for Discharge     DISCHARGE RECOMMENDATIONS  Recommended DME: transfer board, bariatric 3-in-1    Post Discharge Care:   []Home Independently  [x]Home with 24hr Care / Supervision []Home with Partial Supervision []Home with Home Health OT []Home with Out Pt OT []Other: ___   Comments:         Time in Time out Tx Time Breakdown  Variance:   First Session  0815 0845 [x] Individual  tx -30 Mins   [] Concurrent Tx-   [] Co-Tx -   [] Group Tx -   [] Time Missed -     Second Session   [] Individual Tx-  [] Concurrent Tx -  [] Co-Tx -   [] Group Tx -   [] Time Missed -     Third Session    [] Individual Tx-   [] Concurrent Tx -  [] Co-Tx -   [] Group Tx -   [] Time Missed -         Total Tx Time:90 Mins     Shawn SINGHA/L [id number]

## 2021-02-22 PROCEDURE — 97535 SELF CARE MNGMENT TRAINING: CPT

## 2021-02-22 PROCEDURE — 6370000000 HC RX 637 (ALT 250 FOR IP): Performed by: PHYSICAL MEDICINE & REHABILITATION

## 2021-02-22 PROCEDURE — 92526 ORAL FUNCTION THERAPY: CPT

## 2021-02-22 PROCEDURE — 94640 AIRWAY INHALATION TREATMENT: CPT

## 2021-02-22 PROCEDURE — 97530 THERAPEUTIC ACTIVITIES: CPT

## 2021-02-22 PROCEDURE — 1280000000 HC REHAB R&B

## 2021-02-22 PROCEDURE — 6360000002 HC RX W HCPCS: Performed by: PHYSICAL MEDICINE & REHABILITATION

## 2021-02-22 PROCEDURE — 97110 THERAPEUTIC EXERCISES: CPT

## 2021-02-22 RX ADMIN — ALBUTEROL SULFATE 2.5 MG: 1.25 SOLUTION RESPIRATORY (INHALATION) at 10:54

## 2021-02-22 RX ADMIN — APIXABAN 5 MG: 5 TABLET, FILM COATED ORAL at 08:26

## 2021-02-22 RX ADMIN — RIFAXIMIN 550 MG: 550 TABLET ORAL at 08:27

## 2021-02-22 RX ADMIN — MINERAL OIL AND WHITE PETROLATUM: 150; 830 OINTMENT OPHTHALMIC at 20:08

## 2021-02-22 RX ADMIN — ATORVASTATIN CALCIUM 20 MG: 20 TABLET, FILM COATED ORAL at 20:09

## 2021-02-22 RX ADMIN — RIFAXIMIN 550 MG: 550 TABLET ORAL at 20:10

## 2021-02-22 RX ADMIN — PANTOPRAZOLE SODIUM 40 MG: 40 TABLET, DELAYED RELEASE ORAL at 05:38

## 2021-02-22 RX ADMIN — IPRATROPIUM BROMIDE AND ALBUTEROL SULFATE 1 AMPULE: 2.5; .5 SOLUTION RESPIRATORY (INHALATION) at 16:02

## 2021-02-22 RX ADMIN — CHLORHEXIDINE GLUCONATE 0.12% ORAL RINSE 15 ML: 1.2 LIQUID ORAL at 20:09

## 2021-02-22 RX ADMIN — METOPROLOL TARTRATE 25 MG: 25 TABLET, FILM COATED ORAL at 20:11

## 2021-02-22 RX ADMIN — METOPROLOL TARTRATE 25 MG: 25 TABLET, FILM COATED ORAL at 08:27

## 2021-02-22 RX ADMIN — APIXABAN 5 MG: 5 TABLET, FILM COATED ORAL at 20:09

## 2021-02-22 RX ADMIN — IPRATROPIUM BROMIDE AND ALBUTEROL SULFATE 1 AMPULE: 2.5; .5 SOLUTION RESPIRATORY (INHALATION) at 10:56

## 2021-02-22 RX ADMIN — IPRATROPIUM BROMIDE AND ALBUTEROL SULFATE 1 AMPULE: 2.5; .5 SOLUTION RESPIRATORY (INHALATION) at 19:53

## 2021-02-22 RX ADMIN — ACETAMINOPHEN 650 MG: 325 TABLET ORAL at 05:39

## 2021-02-22 RX ADMIN — ACETAMINOPHEN 650 MG: 325 TABLET, FILM COATED ORAL at 10:40

## 2021-02-22 RX ADMIN — CHLORHEXIDINE GLUCONATE 0.12% ORAL RINSE 15 ML: 1.2 LIQUID ORAL at 08:27

## 2021-02-22 RX ADMIN — Medication: at 20:18

## 2021-02-22 RX ADMIN — ASPIRIN 81 MG: 81 TABLET, CHEWABLE ORAL at 08:26

## 2021-02-22 ASSESSMENT — PAIN SCALES - GENERAL
PAINLEVEL_OUTOF10: 5
PAINLEVEL_OUTOF10: 4
PAINLEVEL_OUTOF10: 0

## 2021-02-22 ASSESSMENT — PAIN DESCRIPTION - ONSET: ONSET: ON-GOING

## 2021-02-22 ASSESSMENT — PAIN DESCRIPTION - FREQUENCY: FREQUENCY: CONTINUOUS

## 2021-02-22 ASSESSMENT — PAIN DESCRIPTION - PROGRESSION: CLINICAL_PROGRESSION: NOT CHANGED

## 2021-02-22 NOTE — PROGRESS NOTES
02/22/21 1706   Attendance   Activity   ([address])   Participation Active participation   [place] Demonstrates ability to complete social goals   Social Skills Interacts independently in social activity   Leisure Education Demonstrates knowledge of benefits of leisure involvement  [place] Prior identified coordination as a benefit .)   Time Spent With Patient   Minutes 20

## 2021-02-22 NOTE — PROGRESS NOTES
Physical Therapy  Treatment Note  Evaluating Therapist: Luz Schulz DPT    NAME: Christie Neff  ROOM: [id number]  DIAGNOSIS: Acute metabolic encephalopathy  PRECAUTIONS: Falls, PEG tube, trach (capped), L hemiparesis, LE proprioceptive deficits  HPI: 76year old male admitted to [facility] on 11/18/20 with respiratory failure secondary to witnessed aspiration event at home - chronic dysphagia with feeding tube. Intubated twice at [facility] with subsequent trach placement. Admitted to [facility] for vent weaning. Transferred to ARU 2/1. Social:  Pt lives with his son in a trailer with 8 steps with B rails or a ramp to enter. Prior to admission pt ambulated with [address] Mod I, however this information was obtained from an old note in his chart. It is currently believed that the pt was not independent and rather required assistance with all activities. 2/22/21 Pt's son confirmed that the pt required near Max A for half stand pivot transfers at home prior to his most recent hospital admission. Initial Evaluation  2/3/21 AM     PM    Short Term Goals Long Term Goals    Was pt agreeable to Eval/treatment? yes yes yes     Does pt have pain? L wrist and R shoulder pain L wrist and B shoulder pain L wrist and B shoulder pain     Bed Mobility  Rolling: SBA  Supine to sit: SBA  Sit to supine: SBA  Scooting: SBA Independent all aspects NT Supervision Independent   Transfers Sit to stand: Max A x2  Stand to sit: Max A x2  Stand pivot: NT  Slideboard: Max A Sit to stand: Max A  Stand to sit: Max A  Stand pivot: NT  Slideboard: Min A mat table<>wheelchair Sit to stand: Max A  Stand to sit:  Max A  Stand pivot: NT  Slideboard: Min A mat table<>wheelchair Min A SBA   Ambulation   NT 10 feet x2 reps in parallel bars with Mod A + WC follow NT     Walking 10 feet on uneven surface NT NT NT     Wheel Chair Mobility 100 feet  feet Supervision NT >200 feet Supervision >200 feet Independent   Car Transfers NT NT NT Min A SBA   Stair negotiation: ascended and descended NT NT NT     Curb Step:   ascended and descended NT NT NT     Picking up object off the floor NT NT NT     BLE ROM WFL WFL      BLE Strength RLE 4/5  LLE 3+/5 RLE 4/5  LLE 3+/5      Balance  Sitting: SBA   Static stand: Max A x2 Sitting: Independent  Static stand: Max A       Date Family Teach Completed TBA Son on 2/22/21; Son was a no-show for family training 3 times      Is additional Family Teaching Needed? Y or N Y N      Hindering Progress Debility, L hemiparesis Debility, L hemiparesis      PT recommended ELOS 4-5 weeks       Team's Discharge Plan        Therapist at Team Meeting          Therapeutic Exercise:   AM:   - Slideboard transfer mat table<>wheelchair  - Supine bridge with iso hip adduction into bolster 3x10 to improve hip extension strength  - Supine physio ball leg curls with manual resisted hip/knee extension to improve AROM and BEL strength  PM:  - Family training with son    Additional Comments: Pt continues to have significant functional deficits as a result of the pt's debility and impaired strength levels. Pt initially was making progress in slideboard transfers with level surfaces however has recently stalled in progress. Pt during slideboard transfers with level surfaces still requires some assistance to slide and manage the board. Pt still requires significant levels of assistance to compelte sit<>stand transfers and ambulation in parallel bars. Pt demonstrated some improvement in R foot clearance during RLE swing phase today especially when L knee block was provided by PT. Pt at this time is still not a functional ambulator and all ambulation right now is for therapeutic activity purposes and to improve the pt's weight bearing and BLE strength. Incorporated various therapeutic exercises/activities today to improve the pt's BLE strength and function.     Pt's son was present for family training in the PM. Pt's son confirmed that the pt required near Max A for half stand pivot transfers at home prior to his most recent hospital admission. Pt's son stated that the pt is mostly non-compliant at home and sleeps. Pt's son confirmed that he will use a transportation system to transport pt home in a wheelchair and up a ramp into the home. Pt's son refuses SNF and is going to take him home regardless. Educated pt's son on the safer option of transferring the pt being the slideboard versus Max A half stand pivots. Pt's son stated that he is going to  and lift the pt regardless of using a slideboard or half stand pivot because he does not like to see the pt struggle. It is in this PT's professional opinion that the pt going home at this level and performing half stand pivots is not a safe and viable option. This PT expressed this to pt's son however he wishes to proceed with it. Patient/family education  Pt/family educated on safety, slideboard recommendation     Patient/family response to education:   Pt/family verbalized understanding Pt/family demonstrated skill Pt/family requires further education in this area   yes With cues/assist yes     AM  Time in: 0915  Time out: 1000  PM  Time in: 1515  Time out: 1600    Pt is making fair progress toward established Physical Therapy goals. Continue with physical therapy current plan of care.     Cristina Soto DPT [id number]

## 2021-02-22 NOTE — PROGRESS NOTES
Precilla Lanes, M.D.,[facility]  Barbie Hale D.O., F.AMargaritaC.O.I., Melissa Khan M.D. Debbie Stewart M.D., Alireza Lawson M.D. Analisa Dixon D.O. Daily Pulmonary Progress Note    Patient:  Andrey Alejo 68 y.o. male MRN: [id number]     Date of Service: 2/22/2021      Synopsis     We are following patient for respiratory failure    \"CC\" cough    Code status: Full      Subjective      Patient seen and examined in room sitting up in chair , I updated his son via phone . His trach is capped and he is phonating well. He has an occasional cough with white sputum. Lung sounds are clear. Review of Systems:  Constitutional: Denies fever, weight loss, night sweats, and fatigue  Skin: Denies pigmentation, dark lesions, and rashes   HEENT: Denies hearing loss, tinnitus, ear drainage, epistaxis, sore throat, and hoarseness. Cardiovascular: Denies palpitations, chest pain, and chest pressure. Respiratory: + cough, dyspnea at rest, hemoptysis, apnea, and choking.   Gastrointestinal: Denies nausea, vomiting, poor appetite, diarrhea, heartburn or reflux  Genitourinary: Denies dysuria, frequency, urgency or hematuria  Musculoskeletal: Denies myalgias, muscle weakness, and bone pain  Neurological: Denies dizziness, vertigo, headache, and focal weakness  Psychological: Denies anxiety and depression  Endocrine: Denies heat intolerance and cold intolerance  Hematopoietic/Lymphatic: Denies bleeding problems and blood transfusions    24-hour events:  None    Objective   Vitals: /74   Pulse 119   Temp 98.7 °F (37.1 °C) (Temporal)   Resp 18   Ht 6' 3\" (1.905 m)   Wt 212 lb 8 oz (96.4 kg)   SpO2 97%   BMI 26.56 kg/m²     I/O:    Intake/Output Summary (Last 24 hours) at 2/22/2021 1350  Last data filed at 2/22/2021 1039  Gross per 24 hour   Intake 1560 ml   Output 250 ml   Net 1310 ml       Vent Information  SpO2: 97 %                CURRENT MEDS :  Scheduled Meds:   lactulose  20 g Oral TID  sennosides  5 mL Per G Tube Nightly    mineral oil-hydrophil petrolat   Topical BID    lubrifresh P.M. Left Eye Nightly    albuterol  2.5 mg Nebulization BID    apixaban  5 mg Per G Tube BID    aspirin  81 mg PEG Tube Daily    atorvastatin  20 mg Oral Nightly    chlorhexidine  15 mL Mouth/Throat BID    ipratropium-albuterol  1 ampule Inhalation Q4H WA    metoprolol tartrate  25 mg Oral BID    pantoprazole  40 mg Oral QAM AC    rifaximin  550 mg PEG Tube BID       Physical Exam:  General Appearance: appears comfortable in no acute distress. HEENT: Normocephalic atraumatic without obvious abnormality   Neck: Lips, mucosa, and tongue normal.  Supple, symmetrical, trachea midline, no adenopathy;thyroid:  no enlargement/tenderness/nodules or JVD. Lung: Breath sounds CTA. Respirations   unlabored. Symmetrical expansion. Heart: RRR, normal S1, S2. No MRG  Abdomen: Soft, NT, ND. BS present x 4 quadrants. No bruit or organomegaly. Extremities: Pedal pulses 2+ symmetric b/l. Extremities normal, no cyanosis, clubbing, or edema. Musculokeletal: No joint swelling, no muscle tenderness. ROM normal in all joints of extremities. Neurologic: Mental status: Alert and Oriented X3 .     Pertinent/ New Labs and Imaging Studies     Imaging Personally Reviewed:  2/1/2021    ECHO  None on file    Labs:  Lab Results   Component Value Date    WBC 2.7 02/17/2021    HGB 13.3 02/17/2021    HCT 42.3 02/17/2021    MCV 87.4 02/17/2021    MCH 27.5 02/17/2021    MCHC 31.4 02/17/2021    RDW 15.8 02/17/2021     02/17/2021    MPV 11.2 02/17/2021     Lab Results   Component Value Date     02/17/2021    K 4.5 02/17/2021    K 4.4 02/03/2021     02/17/2021    CO2 31 02/17/2021    BUN 26 02/17/2021    CREATININE 0.7 02/17/2021    LABALBU 3.2 02/17/2021    CALCIUM 9.3 02/17/2021    GFRAA >60 02/17/2021    LABGLOM >60 02/17/2021     Lab Results   Component Value Date    PROTIME 19.7 12/03/2020    INR 1.7 12/03/2020     No results for input(s): PROBNP in the last 72 hours. No results for input(s): PROCAL in the last 72 hours. This SmartLink has not been configured with any valid records. Assessment:    1. Acute respiratory failure with hypoxia and hypercapnia-resolved  2. Hemoptysis post trach change 1/27/2021-resolved  3. Off vent since 1/6/2021  4. Aspiration pneumonia completed [place]. History of witnessed aspiration event at home  5. Leukopenia  6. Tracheostomy 1113 from outside hospital, downsized to 1/27/2020 1/26 Shiley cuffless, this is second time he has had a trach was trach after his CVA   7. Bilateral pleural effusions with compressive atelectasis status post thoracentesis 1.5 L fluid removed transudate on 12/2/20  8. History of CVA  9. Probable LEONARDO  10. COPD  6. CAD status post stents PCI 2018  12. A. fib on Eliquis  13. BPH  14. Cirrhosis  15. Bronchoscopy from outside hospital on 11/6/2020 with evidence of tracheobronchomalacia  16. Previous ABGs  chronic hypercapnia 1/18/2021      Plan:   1. Full trach capping monitor off oxygen keep greater than 92%.   2. 1/29 bronchoscopy for airway examination no obvious source of bleeding identified  3. Follow chest x-ray   4. Previous ABGs with chronic hypercapnia 1/18/2021  5. Completed tobramycin aerosols and cefepime for Pseudomonas tracheobronchitis  6.  baby aspirin and Eliquis resumed on 2/2/2021  7. Bronchodilator regimen albuterol twice daily, saline nebs 3% as needed. Robinul  daily for secretions. 8. Speech therapy-barium swallow 2/4/2021 aspiration, recommendation n.p.o., continue feeding with PEG  9. PT OT  10. Nutritional support tube feeds via PEG tube  11. Bowel regimen  12. GI prophylaxis    Considering patient's history of trach x2, continued dysphagia, aspiration history and difficulty clearing secretions, multiple lung infections, failed swallow study and hypercapnia would not recommend decannulation at this time.         This plan of care was reviewed in collaboration with Dr. Jed Helton  Electronically signed by PAYTON Roger CNP on 2/22/2021 at 1:50 PM    I personally saw, examined, and cared for the patient. Labs, medications, radiographs reviewed. I agree with history exam and plans detailed in NP note.         Electronically signed by Claudetta Proffer, DO on 2/22/2021 at 5:43 PM

## 2021-02-22 NOTE — PROGRESS NOTES
Speech Language Pathology  ACUTE REHABILITATION--DAILY PROGRESS NOTE          ADMITTING DIAGNOSIS: Acute metabolic encephalopathy [B80.22]     SPEECH PATHOLOGY DIAGNOSIS:    Mild-moderate cognitive deficit. 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 Botox received in 2008 with short term success). Decreased intelligibility due to weakness, missing dentition, fast rate and resonance issues.     THERAPY RECOMMENDATIONS:   Speech Pathology intervention is recommended 3-6 times per week for LOS or when goals are met with emphasis on the following:      Improve STM recall, sequencing and problem solving for increased functional independence with completion of ADLs/IADLs to a FIM level commensurate with modified independent.                                                                                                                                         FIMS SCORES                            Swallowing                          Current Status             1--Total Assistance                   Short Term Goal         4--Minimal Assistance               Long Term Goal          5--Supervision      Receptive                          Current Status             5--Supervision               Short Term Goal         6--Modified Independent                       Long Term Goal          6--Modified Independent              Expressive                          Current Status             5--Supervision               Short Term Goal         6--Modified Independent                       Long Term Goal          6--Modified Independent              Social Interaction                          Current Status             6--Modified Independent                       Short Term Goal         6--Modified Independent                       Long Term Goal          6--Modified Independent                                                             Problem Solving                          Current Status            4--Minimal Assistance / 3--Moderate Assistance               Short Term Goal          4--Minimal Assistance              Long Term Goal            5--Supervision                        Memory                          Current Status            4--Minimal Assistance  / 3--Moderate Assistance                     Short Term Goal         4--Minimal Assistance   Long Term Goal          5--Supervision                                              SWALLOWING:      Diet:  NPO (nothing by mouth including oral meds). PEG tube in place. Chronic dysphagia reported but inconsistent information provided by patient. Lengthy case history taken by other SLP from patient and from pulmonary NP- see MBS report. Note from 2/4: Speech Pathologist (SLP) completed education with the patient/family regarding procedure of Modified Barium Swallow Study prior to exam and then type of swallowing impairment following completion of MBSS. Reviewed current solid/liquid consistency diet recommendations (NPO (nothing by mouth including oral meds). Discussed strategies trialed (as previously used by Pt) with poor outcome and continued aspiration. Images from MBSS reviewed with patient/ family and education provided. Reviewed aspiration precautions. Encouraged patient and/or family to engage SLP in unstructured Q&A session relative to identified deficit areas; indicated understanding of all information provided via satisfactory verbal response. Patient completed exercises for laryngeal and tongue base muscles with fair outcome. Patient benefited from cues to complete each exercise ten consecutive times. LANGUAGE:     Patient conversed easily with clinician and terminated conversation turns appropriately. COGNITION:        Patient benefited from cues when counting number of repetitions during swallowing exercises.     Patient had fair+ recall during conversation regarding TV schedule and recent therapies. Safety:  Fair-   Other therapies (PT, OT) report concerns with safety. SPEECH:     Difficult to understand at times due to facial weakness and absence of teeth. SP recommended after discharge: yes  Supervision recommended at discharge: 24 hour    Will continue SP intervention as per previously established POC.     Minute Tracking:    Individual therapy:    15 minutes  Concurrent therapy:    0  minutes  Group therapy:     0 minutes  Co-treatment therapy:    0 minutes    Total minutes for 2/22/2021: 15 minutes  Variance -- patient declined additional therapy this date    Ajith Jimenez   SLP student intern

## 2021-02-22 NOTE — PROGRESS NOTES
Nutrition Assessment     Type and Reason for Visit: Reassess    Nutrition Recommendations/Plan: Recommend continuing current EN regimen as tolerated. -Recommend reassessing ammonia levels, as pt may require protein restriction to EN. Nutrition Assessment:  Pt currently receiving bolus feeds with protein modular BID to meet estimated needs. Note 2/17 pt w/ hyperammonia at 66, being medically managed with lacutlose. Recs above. Malnutrition Assessment:  Malnutrition Status: Severe malnutrition    Estimated Daily Nutrient Needs:  Energy (kcal): ; Weight Used for Energy Requirements:  Current     Protein (g): 115-130; Weight Used for Protein Requirements:  Ideal(1.3-1.5)        Fluid (ml/day): ; Weight Used for Fluid Requirements:  1 ml/kcal      Nutrition Related Findings: A/Ox4, impaired hearing, missing teeth, PEG,+BS, no noted edema, +I/O's, hyperammonia 2/17 78      Current Nutrition Therapies:    DIET TUBE FEEDING BOLUS NPO; 1.5 Calorie with Fiber; Gastrostomy; 240    Anthropometric Measures:  · Height: 6' 3\" (190.5 cm)  · Current Body Wt: 212 lb (96.2 kg)(2/2)   · BMI: 26.5    Nutrition Diagnosis:   · Inadequate oral intake related to cognitive or neurological impairment as evidenced by NPO or clear liquid status due to medical condition, nutrition support - enteral nutrition    Nutrition Interventions:   Food and/or Nutrient Delivery:  Continue NPO, Continue Current Tube Feeding  Nutrition Education/Counseling:  Education not indicated   Coordination of Nutrition Care:  Continue to monitor while inpatient    Goals:   Tolerance to EN       Nutrition Monitoring and Evaluation:   Behavioral-Environmental Outcomes:  None Identified   Food/Nutrient Intake Outcomes:  Enteral Nutrition Intake/Tolerance  Physical Signs/Symptoms Outcomes:  Hemodynamic Status, Biochemical Data, Chewing or Swallowing, Nutrition Focused Physical Findings, Skin, Weight, GI Status, Constipation, Fluid Status or Edema     Discharge Planning:    Enteral Nutrition     Electronically signed by Talia Jones RD, LD on 2/22/21 at 10:49 AM EST    Contact: [phone]

## 2021-02-22 NOTE — PROGRESS NOTES
Physical Therapy  Weekly Team Note  Evaluating Therapist: Charisse Lagunas DPT    NAME: Chidi Schroeder  ROOM: [id number]  DIAGNOSIS: Acute metabolic encephalopathy  PRECAUTIONS: Falls, PEG tube, trach (capped), L hemiparesis, LE proprioceptive deficits  HPI: 76year old male admitted to [facility] on 11/18/20 with respiratory failure secondary to witnessed aspiration event at home - chronic dysphagia with feeding tube. Intubated twice at [facility] with subsequent trach placement. Admitted to [facility] for vent weaning. Transferred to ARU 2/1. Social:  Pt lives with his son in a trailer with 8 steps with B rails or a ramp to enter. Prior to admission pt ambulated with [employer] Mod I, however this information was obtained from an old note in his chart. It is currently believed that the pt was not independent and rather required assistance with all activities. 2/22/21 Pt's son confirmed that the pt required near Max A for half stand pivot transfers at home prior to his most recent hospital admission. Initial Evaluation  2/3/21 2/8/21    2/15/21 2/22/21 Comments    Short Term Goals Long Term Goals    Bed Mobility  Rolling: SBA  Supine to sit: SBA  Sit to supine: SBA  Scooting: SBA Rolling: SBA  Supine to sit: SBA  Sit to supine: SBA  Scooting: SBA Supervision all aspects Independent all aspects  Supervision Independent   Transfers Sit to stand: Max A x2  Stand to sit: Max A x2  Stand pivot: NT  Slideboard: Max A Sit to stand: Max A  Stand to sit: Max A  Stand pivot: NT  Slideboard: Max A Sit to stand: Max A  Stand to sit: Max A  Stand pivot: NT  Slideboard: Min A (assist for slideboard placement and removal) Sit to stand: Max A  Stand to sit:  Max A  Stand pivot: NT  Slideboard: Min A mat table<>wheelchair Limited significantly by generalized debility and BLE weakness  Pt has made progress in slideboard transfers but does requires increased time and some assistance to complete Min A SBA   Ambulation   NT 5 feet x2 reps and 3 feet x1 rep in parallel bars with Mod/Max A + WC follow 10 feet x2 reps in parallel bars with Mod/Max A + WC follow 10 feet in parallel bars with Mod A + WC follow Flexed posture, bent knees, minimal foot clearance  Attempted ambulation with walker however pt really struggled and was unsafe  Removed pt's ambulation goals, functioning at a wheelchair level  >   Wheel Chair Mobility 100 feet  feet Supervision 200 feet Supervision 300 feet Supervision Decreased speed >200 feet Supervision >200 feet Independent   Car Transfers NT NT NT NT Unsafe at this time Min A SBA   Stair negotiation: ascended and descended NT NT NT NT Unsafe at this time  Removed pt's stair goals as he will not progress to stair negotiation     BLE ROM [facility] WFL      BLE Strength RLE 4/5  LLE 3+/5 RLE 4/5  LLE 3+/5 RLE 4/5  LLE 3+/5 RLE 4/5  LLE 3+/5      Balance  Sitting: SBA   Static stand: Max A x2 Sitting: SBA   Static stand: Max A  Sitting: Supervision   Static stand: Max A  Sitting: Supervision   Static stand: Mod/Max A      Date Family Teach Completed TBA TBA TBA Son on 2/22/21; Son was a no-show for family training 3 times      Is additional Family Teaching Needed? Y or N Y Y Y N      Hindering Progress Debility, L hemiparesis Debility, L hemiparesis Debility, L hemiparesis Debility, L hemiparesis      PT recommended ELOS 4-5 weeks  2 weeks Later in the week this week      Team's Discharge Plan  Re-eval next week  2 weeks Discharge Friday 2/26/21      Therapist at Team Meeting  Faby Gonzalez, [address] [facility], DPT [id number]   Faby Gonzalez, PT, DPT [id number]   Faby Gonzalez, PT, DPT [id number]          Date:  2/8/21  Supporting factors:  Verbally states numerous times that he wants to improve and work with therapy  Barriers to discharge:  Debility, generalized weakness  Additional comments:  Pt is significantly impaired functionally due to his global debility, fatigue, and weakness.  Pt is well aware of his deficits and would like to improve upon them as much as he can. Pt will need max amount of time if he is to return home even if it is at a wheelchair level. DME:  TBD closer to discharge  After Care:  TBD closer to discharge  Varun Blood DPT [id number]    Date:  2/15/21  Supporting factors:  Verbally states numerous times that he wants to improve and work with therapy  Barriers to discharge:  Debility, generalized weakness  Additional comments:  Pt is making progress in terms of functioning at a wheelchair level which is how the pt will more than likely discharge. Ambulation at this point is more for therapeutic purposes. Family training should be completed ASAP to determine if pt's son can handle pt at this level. DME: If home - wheelchair and slideboard  After Care: TBD closer to discharge  Varun Blood DPT [id number]    Date:  2/22/21  Supporting factors:  Verbally states numerous times that he wants to improve and work with therapy  Barriers to discharge:  Debility, generalized weakness  Additional comments:  Pt at this time is not a functional ambulator and goals were downgraded to wheelchair level. Pt still requires assistance with slideboard transfers as he is limited by generalized weakness/debility. Pt's son was present for family training in the PM. Pt's son confirmed that the pt required near Max A for half stand pivot transfers at home prior to his most recent hospital admission. Pt's son stated that the pt is mostly non-compliant at home and sleeps. Pt's son confirmed that he will use a transportation system to transport pt home in a wheelchair and up a ramp into the home. Pt's son refuses SNF and is going to take him home regardless. Educated pt's son on the safer option of transferring the pt being the slideboard versus Max A half stand pivots. Pt's son stated that he is going to  and lift the pt regardless of using a slideboard or half stand pivot because he does not like to see the pt struggle.  It is in this PT's professional opinion that the pt going home at this level and performing half stand pivots is not a safe and viable option. This PT expressed this to pt's son however he wishes to proceed with it.   DME: Pt owns a wheelchair and slideboard  After Care: Pt would benefit from [facility] PT if willing to participate in it  Kiarra Peoples [id number]

## 2021-02-22 NOTE — PROGRESS NOTES
OCCUPATIONAL THERAPY DAILY NOTE    Date:2021  Patient Name: Sandor Chapa  MRN: [id number]  : 1945  Room: [address] [place] [address]     Diagnosis: RF secondary to aspiration @ home, trach & peg  Additional Pertinent Hx: CHF, COPD, hx CVA, dysphagia, peg heart disease  Precautions: Falls, NPO, Peg    Functional Assessment:   Date Status AE  Comments   Feeding 21 NPO     Grooming  SBA/S seated Seated at EOB pt washed face/hands with SBA required for dynamic sitting balance/safety. Oral Care 21 SBA/S seated 21: Pt deferred oral care this AM, reports he completed it prior to therapist arrival.   Bathing 21 Mod A Seated Sponge bath completed seated at EOB with assist required to wash philip area/buttocks, L UE and R foot. Min cues for safety required with fair follow through. Pt requires SBA for dynamic balance during bathing task. UB Dressing 21 SBA/S  Seated at EOB pt donned pull over shirt with SBA/S required for dynamic balance/safety during activity. LB Dressing 21 Mod A  To don brief and pants seated at EOB. Pt required assist to thread R LE into brief/pants and to manage clothing over hips on both sides bed level. Pt continues to defer LB AE despite educated on benefits. Footwear 21 Mod A Seated  To don/doff socks and don slippers seated at EOB. Pt requires assist to don R sock and shoe seated at EOB utilizing leg cross over technique. Pt continues to defer LB AE use to don/doff socks/shoes. Toileting 2/3/21 Max A Bed level     Homemaking 21 SBA       Functional Transfers / Balance:   Date Status DME  Comments   Sit Balance 21 SBA  Seated at EOB during dynamic ADL tasks. Stand Balance 21 Mod A x2     [] Tub  [] Shower   Transfer  TBA-  Pt sponge bathed at EOB PTA.      Commode   Transfer 21 Mod/Min A Bariatric drop arm 3-in-1, transfer board Pt requires assist for placement/removal of board and for safety during transfer d/t leaning back and scooting forward instead of laterally. Min cues for safety provided. Functional   Mobility 2/22/21 SBA/S W/c W/c mobility completed within OT clinic. Pt occasionally requires assist to maneuver in smaller spaces. Other:  Bed>W/c      Supine to EOB   2/22/21 2/22/21   Mod/Min A      SBA   Transfer board    Bed rail   Assist for board placement and to complete transfer. Functional Exercises / Activity  -Light resistance putty activites completed with L hand with focus on increasing L hand /pinch strength. Fair tolerance.   -Adaptive ball toss activity completed seated with focus on increasing B UE AROM/strength and overall endurance for functional activities. Sensory / Neuromuscular Re-Education:      Cognitive Skills:   Status Comments   Problem   Solving fair  During therapeutic activities and LB dressing/reacher training. Memory fair  \"   Sequencing fair  \"   Safety fair - Min cues for safety awareness/insight required. Visual Perception:    Education:  Pt educated on safety and adaptive techniques for ADL tasks. Pt verbalized/demonstrated fair understanding, recommend continued education. [x] Family teach completed on: 2/17/21: No family present for scheduled FT session this date. 2/19/21: No family present for scheduled FT this AM. 2/22/21: Pt's son arrived at ~1320 for scheduled FT session this date. Discussed pt's current ADL level of assist and recommendation for transfer board transfer to [facility] for safety. Kishore Branch reports that he was assisting pt with all bathing/dressing PTA. Pt completed transfer to/from [facility] with assist from therapist despite encouragement of son to provide hands on assist. Son stated multiple times that he just SPT's pt to the commode when needed and that PTA pt was utilizing urinal and would have BM in his depend d/t decreased awareness of bowel movements and that the son would provide hygiene to pt bed level following a BM.  Son stated pt received a w/c cushion from [facility] and it's \"flat as a rock\". Pt's son reports no further questions/concerns. Pain Level: No c/o pain. Additional Notes:    2/15/21: Dynamometer:R: 34        L: 10       9 Hole Peg: R: 49 Sec         L: 35 Sec    2/22/21: Dynamometer: R: 41#         L: 13#      MMT: R: NT d/t pain per pt request               L: 4-/5      Patient has made fair  progress during treatment sessions toward set goals. Therapy emphasis to obtain goals:  Current Treatment Recommendations: Strengthening, Gait Training, Patient/Caregiver Education & Training, Home Management Training, ROM, Equipment Evaluation, Education, & procurement, Balance Training, Functional Mobility Training, Endurance Training, Wheelchair Mobility Training, Safety Education & Training, Self-Care / ADL      Long term goal 2: Pt demo Mod I grooiming seated  Long term goal 3: Pt demo Min A bathing @ sink level or shower level seated  Long term goal 4: Pt demo s/u UE & Min A LE dress to don depends, pants, socks & shoes using AE as needed  Long term goal 5: Pt demo Min-Mod  A commode trf wtih appropriate DME to ensure pt safety  Long term goals 6: Pt demo Min-Mod A tub trf with appropriate DME to ensure pt safety  Long term goal 7: Pt demo Sup light homemaking activity @ wc level  Long term goal 8: Pt demo G- endurance for a 20 minute funcitonal activity  Long term goal 9: Pt demo increase BUE strength to 4+/5 to improve pt ability to complete ADLs  Long term goal 10: Pt demo Mod I wc propulsion throughout a living environment & around obstacles    2/16/21: Pt plan of care updated on this date. Family teach scheduled for 2/19/21. Pt tentative length of stay is 2 weeks. [x] Continue with current OT Plan of care.   [] Prepare for Discharge     DISCHARGE RECOMMENDATIONS  Recommended DME: transfer board, bariatric 3-in-1, w/c cushion for pressure relief    Post Discharge Care:   []Home Independently  [x]Home with 24hr Care / Supervision []Home with Partial Supervision []Home with Home Health OT []Home with Out Pt OT []Other: ___   Comments:         Time in Time out Tx Time Breakdown  Variance:   First Session  0830 0915 [x] Individual  tx -45 Mins   [] Concurrent Tx-   [] Co-Tx -   [] Group Tx -   [] Time Missed -     Second Session 1300 1345 [x] Individual Tx-45 Mins  [] Concurrent Tx -  [] Co-Tx -   [] Group Tx -   [] Time Missed -     Third Session    [] Individual Tx-   [] Concurrent Tx -  [] Co-Tx -   [] Group Tx -   [] Time Missed -         Total Tx Time:90 Mins     Shawn Guadalupe ABDULLAHI/L [id number]      I have read & agree with the above status.     Verenice Murphy OTR/L [id number]

## 2021-02-22 NOTE — PROGRESS NOTES
Progress Note  Date:2021       Room:55055505-  Patient Name:Isael Rachel     Date of Birth:     Age:76 y.o. Subjective    Subjective:  Symptoms:  Stable. He reports weakness. Diet:  NPO. Activity level: Impaired due to weakness. Pain:  He reports no pain. Review of Systems   Neurological: Positive for weakness. Objective         Vitals Last 24 Hours:  TEMPERATURE:  Temp  Av.7 °F (37.1 °C)  Min: 98.7 °F (37.1 °C)  Max: 98.7 °F (37.1 °C)  RESPIRATIONS RANGE: Resp  Av  Min: 18  Max: 18  PULSE OXIMETRY RANGE: SpO2  Av %  Min: 96 %  Max: 96 %  PULSE RANGE: Pulse  Av  Min: 119  Max: 119  BLOOD PRESSURE RANGE: Systolic (58BYA), YGN:389 , Min:105 , QD   ; Diastolic (48SFW), VUX:92, Min:58, Max:74    I/O (24Hr): Intake/Output Summary (Last 24 hours) at 2021 0736  Last data filed at 2021 6103  Gross per 24 hour   Intake 1260 ml   Output 250 ml   Net 1010 ml     Objective:  General Appearance: In no acute distress. Vital signs: (most recent): Blood pressure 113/74, pulse 119, temperature 98.7 °F (37.1 °C), temperature source Temporal, resp. rate 18, height 6' 3\" (1.905 m), weight 212 lb 8 oz (96.4 kg), SpO2 96 %. Vital signs are normal.    Output: Producing urine and producing stool. Lungs:  Normal effort and normal respiratory rate. Breath sounds clear to auscultation. Heart: Normal rate. Regular rhythm. S1 normal and S2 normal.    Abdomen: Abdomen is soft. Bowel sounds are normal.   There is no abdominal tenderness. Extremities: Normal range of motion. Neurological: Patient is alert. (4/5 strength). Labs/Imaging/Diagnostics    Labs:  CBC:No results for input(s): WBC, RBC, HGB, HCT, MCV, RDW, PLT in the last 72 hours. CHEMISTRIES:No results for input(s): NA, K, CL, CO2, BUN, CREATININE, GLUCOSE, PHOS, MG in the last 72 hours.     Invalid input(s): CA  PT/INR:No results for input(s): PROTIME, INR in the last 72 hours.  APTT:No results for input(s): APTT in the last 72 hours. LIVER PROFILE:No results for input(s): AST, ALT, BILIDIR, BILITOT, ALKPHOS in the last 72 hours. Imaging Last 24 Hours:  No results found. Assessment//Plan           Hospital Problems           Last Modified POA    Acute metabolic encephalopathy 6/9/5976 Yes    Severe protein-calorie malnutrition (HCC) (Chronic) 2/4/2021 Yes        Initial Evaluation  2/3/21 AM     PM    Short Term Goals Long Term Goals      Was pt agreeable to Eval/treatment? yes yes NT       Does pt have pain? L wrist and R shoulder pain L wrist and B shoulder pain; R \"hip pointer\"         Bed Mobility  Rolling: SBA  Supine to sit: SBA  Sit to supine: SBA  Scooting: SBA Independent all aspects   Supervision Independent   Transfers Sit to stand: Max A x2  Stand to sit: Max A x2  Stand pivot: NT  Slideboard: Max A Sit to stand: Max A  Stand to sit: Max A  Stand pivot: NT  Slideboard: Min A   Min A SBA   Ambulation   NT 10 feet in parallel bars with Mod A + WC follow   25 feet with [employer] with Min A >75 feet with [employer] with SBA   Walking 10 feet on uneven surface NT NT   10 feet with [employer] with Min A 10 feet with [employer] with SBA   Wheel Chair Mobility 100 feet  feet Supervision   >200 feet Supervision >200 feet Independent   Car Transfers NT NT   Min A SBA   Stair negotiation: ascended and descended NT NT   4 steps with B rail with Min A 4 steps with B rail with SBA   Curb Step:   ascended and descended NT NT         Picking up object off the floor NT NT         BLE ROM WFL WFL         BLE Strength RLE 4/5  LLE 3+/5 RLE 4/5  LLE 3+/5         Balance  Sitting: SBA   Static stand: Max A x2 Sitting: Independent  Static stand: Max A          Date Family Teach Completed TBA Son was a no-show for family training 3 times         Is additional Family Teaching Needed?   Y or N Y Y         Hindering Progress Debility, L hemiparesis Debility, L hemiparesis         PT recommended ELOS 4-5 weeks     Assessment:    Condition: In stable condition. Improving. (Metabolic encephalopathy). Plan:   Encourage ambulation. (Still max assist to stand  Ambulating 10 feet in the parallel bars  Pulmonary is following but at this point there is no plan to decannulate  He is tolerating the capping and the tube feeding well).        Electronically signed by Ger Poole MD on 2/22/21 at 7:36 AM EST

## 2021-02-23 PROCEDURE — 6370000000 HC RX 637 (ALT 250 FOR IP): Performed by: PHYSICAL MEDICINE & REHABILITATION

## 2021-02-23 PROCEDURE — 94640 AIRWAY INHALATION TREATMENT: CPT

## 2021-02-23 PROCEDURE — 97110 THERAPEUTIC EXERCISES: CPT

## 2021-02-23 PROCEDURE — 97129 THER IVNTJ 1ST 15 MIN: CPT

## 2021-02-23 PROCEDURE — 97530 THERAPEUTIC ACTIVITIES: CPT

## 2021-02-23 PROCEDURE — 97130 THER IVNTJ EA ADDL 15 MIN: CPT

## 2021-02-23 PROCEDURE — 1280000000 HC REHAB R&B

## 2021-02-23 RX ADMIN — APIXABAN 5 MG: 5 TABLET, FILM COATED ORAL at 08:37

## 2021-02-23 RX ADMIN — METOPROLOL TARTRATE 25 MG: 25 TABLET, FILM COATED ORAL at 20:54

## 2021-02-23 RX ADMIN — ACETAMINOPHEN 650 MG: 325 TABLET, FILM COATED ORAL at 08:36

## 2021-02-23 RX ADMIN — APIXABAN 5 MG: 5 TABLET, FILM COATED ORAL at 20:58

## 2021-02-23 RX ADMIN — LACTULOSE 20 G: 20 SOLUTION ORAL at 14:00

## 2021-02-23 RX ADMIN — MINERAL OIL AND WHITE PETROLATUM: 150; 830 OINTMENT OPHTHALMIC at 20:56

## 2021-02-23 RX ADMIN — IPRATROPIUM BROMIDE AND ALBUTEROL SULFATE 1 AMPULE: 2.5; .5 SOLUTION RESPIRATORY (INHALATION) at 19:09

## 2021-02-23 RX ADMIN — IPRATROPIUM BROMIDE AND ALBUTEROL SULFATE 1 AMPULE: 2.5; .5 SOLUTION RESPIRATORY (INHALATION) at 12:06

## 2021-02-23 RX ADMIN — RIFAXIMIN 550 MG: 550 TABLET ORAL at 08:37

## 2021-02-23 RX ADMIN — CHLORHEXIDINE GLUCONATE 0.12% ORAL RINSE 15 ML: 1.2 LIQUID ORAL at 20:58

## 2021-02-23 RX ADMIN — PANTOPRAZOLE SODIUM 40 MG: 40 TABLET, DELAYED RELEASE ORAL at 06:56

## 2021-02-23 RX ADMIN — ASPIRIN 81 MG: 81 TABLET, CHEWABLE ORAL at 08:35

## 2021-02-23 RX ADMIN — Medication: at 20:56

## 2021-02-23 RX ADMIN — CHLORHEXIDINE GLUCONATE 0.12% ORAL RINSE 15 ML: 1.2 LIQUID ORAL at 08:35

## 2021-02-23 RX ADMIN — RIFAXIMIN 550 MG: 550 TABLET ORAL at 20:54

## 2021-02-23 RX ADMIN — Medication: at 08:46

## 2021-02-23 RX ADMIN — MINERAL OIL AND PETROLATUM: 150; 830 OINTMENT OPHTHALMIC at 08:37

## 2021-02-23 ASSESSMENT — PAIN SCALES - GENERAL
PAINLEVEL_OUTOF10: 0
PAINLEVEL_OUTOF10: 0
PAINLEVEL_OUTOF10: 1
PAINLEVEL_OUTOF10: 0

## 2021-02-23 ASSESSMENT — PAIN - FUNCTIONAL ASSESSMENT: PAIN_FUNCTIONAL_ASSESSMENT: ACTIVITIES ARE NOT PREVENTED

## 2021-02-23 ASSESSMENT — PAIN DESCRIPTION - PROGRESSION: CLINICAL_PROGRESSION: NOT CHANGED

## 2021-02-23 NOTE — PROGRESS NOTES
Physical Therapy  Treatment Note  Evaluating Therapist: Janet Pelletier DPT    NAME: Edith Peer  ROOM: [id number]  DIAGNOSIS: Acute metabolic encephalopathy  PRECAUTIONS: Falls, PEG tube, trach (capped), L hemiparesis, LE proprioceptive deficits  HPI: 76year old male admitted to [facility] on 11/18/20 with respiratory failure secondary to witnessed aspiration event at home - chronic dysphagia with feeding tube. Intubated twice at [facility] with subsequent trach placement. Admitted to [facility] for vent weaning. Transferred to ARU 2/1. Social:  Pt lives with his son in a trailer with 8 steps with B rails or a ramp to enter. Prior to admission pt ambulated with [employer] Mod I, however this information was obtained from an old note in his chart. It is currently believed that the pt was not independent and rather required assistance with all activities. 2/22/21 Pt's son confirmed that the pt required near Max A for half stand pivot transfers at home prior to his most recent hospital admission. Initial Evaluation  2/3/21 AM     PM    Short Term Goals Long Term Goals    Was pt agreeable to Eval/treatment? yes yes yes     Does pt have pain? L wrist and R shoulder pain L wrist and B shoulder pain L wrist and B shoulder pain     Bed Mobility  Rolling: SBA  Supine to sit: SBA  Sit to supine: SBA  Scooting: SBA Independent all aspects Independent all aspects Supervision Independent   Transfers Sit to stand: Max A x2  Stand to sit: Max A x2  Stand pivot: NT  Slideboard: Max A Sit to stand: Max A  Stand to sit: Max A  Stand pivot: NT  Slideboard: Min A mat table<>wheelchair Sit to stand: Max A  Stand to sit:  Max A  Stand pivot: NT  Slideboard: Min A mat table<>wheelchair Min A SBA   Ambulation   NT 10 feet x2 reps and 5 feet x1 rep in parallel bars with Mod/Max A + WC follow NT     Walking 10 feet on uneven surface NT NT NT     Wheel Chair Mobility 100 feet  feet Supervision 150 feet Mod I >200 feet Supervision >200 feet Independent Car Transfers NT NT NT Min A SBA   Stair negotiation: ascended and descended NT NT NT     Curb Step:   ascended and descended NT NT NT     Picking up object off the floor NT NT NT     BLE ROM WFL WFL      BLE Strength RLE 4/5  LLE 3+/5 RLE 4/5  LLE 3+/5      Balance  Sitting: SBA   Static stand: Max A x2 Sitting: Independent  Static stand: Max A       Date Family Teach Completed TBA Son on 2/22/21; Son was a no-show for family training 3 times      Is additional Family Teaching Needed? Y or N Y N      Hindering Progress Debility, L hemiparesis Debility, L hemiparesis      PT recommended ELOS 4-5 weeks       Team's Discharge Plan        Therapist at Team Meeting          Therapeutic Exercise:   AM:   - Multiple sit<>stands in parallel bars  - Standing knee bends in parallel bars 3x10 to improve closed chain knee extensor strength  PM:  - Sit<> parallel bars with x15 knee bends to improve closed chain knee extensor strength  - SAQ 3x20 to improve B knee extensor strength  - Glute bridges 3x10 to improve hip extensor strength      Additional Comments: Pt continues to have significant functional deficits as a result of the pt's debility and impaired strength levels. Pt continues to ambulate with a forward flexed posture, B flexed knees, and shuffling gait. Bilateral knee block provided from PT during ambulation. During sit to stands pt needs heavy assistance to obtain an upright standing position and even knees PT assist to finish the last ~45 degrees to reach knee extension. Majority of pt's session is focused on improving his function at a wheelchair level along with therapeutic activities such as standing and walking to improve pt's functional LE strength. Future PT sessions will continue to improve the pt's ability to function at a wheelchair level, slideboard transfers, and incorporate therapeutic activities such as standing and ambulation to improve functional leg strength.     Patient/family education  Pt/family educated on safety, slideboard recommendation     Patient/family response to education:   Pt/family verbalized understanding Pt/family demonstrated skill Pt/family requires further education in this area   yes With cues/assist yes     AM  Time in: 0915  Time out: 1000  PM  Time in: 1515  Time out: 1600    Pt is making fair progress toward established Physical Therapy goals. Continue with physical therapy current plan of care.     JOVITA CastilloT [id number]

## 2021-02-23 NOTE — PATIENT CARE CONFERENCE
[address] [place], [place] [address] [facility] ACUTE REHABILITATION  TEAM CONFERENCE NOTE/PATIENT PLAN OF CARE    Date: 2021  Admission date: 2021  Patient Name: Taon Rodriguez        MRN: [id number]    : 1945  (77 y.o.)  Gender: male   Rehab diagnosis/surgery with date:  Metabolic encephalopathy  Impairment Group Code:  2.1      MEDICAL/FUNCTIONAL HISTORY/STATUS:  will need trach  for homegoing as well as PEG for non-oral feedings, trach remains capped, is a Shiley cuffless, off oxygen    Consultations/Labs/X-rays: no recent labs since 21      MEDICATION UPDATE:  stop Lipitor      NURSING :    Bowel:   Always Continent  []   Occasionally incontinent  [x]   Frequently incontinent  []   Always incontinent  []   Not occurred  []     Bladder:   Always Continent  [x]    Incontinent less than daily[]   Incontinent  daily []   Always incontinent  []   No urine output    []   Indwelling catheter []       Toilet Hygiene:   Current level : dependent    Skin integrity: stage 2 pressure ulcer right buocks  Pain: none    NUTRITION    Diet  NA-continues on 1.5 ramón with fiber, 5 cans per day as bolus  Liquid consistency   NA    SOCIAL INFORMATION:  Lives with: son  Prior community [facility] home, 2 entry, rail and ramp  Prior Level of function:  assistance with all-was on PEG feedings at home prior to admission  DME:  wheelchair , wheeled walker    FAMILY / PATIENT EDUCATION:  son here on  for education    PHYSICAL THERAPY    Bed mobility:   Current level: independent  Short term bed mobility goal: met  Long term bed mobility goal: met    Chair/bed transfers:  Current level: max assist for sit to stand, with transfer board  is min assist  Short term Chair/bed transfers goal: met  Long term Chair/bed transfers goal: standby assist      Ambulation:   Current level: 10 ft in parallel bars  at mod assist with wheelchair  follow, will not be a functional ambulator  Short term ambulation goal: met  Long term ambulation goal: met    Wheelchair Mobility:  Current level: 300 ft at supervision  Short term wheelchair goal: met  Long term wheelchair goal: 400  ft at Modified independent      Car transfers:   Current level: not safe , will have transportation home    Lower Extremity Strength Issues:  right lower 4/5, left lower is 3+/5        OCCUPATIONAL THERAPY:      Tub/shower:   Current level: NA-did sponge bath at home      Feeding:  Current level: npo    Grooming:   Current level: standby assist seated  Short term grooming goal: met  Long term grooming goal: met    Bathing:  Current level: seated  Short term bathing goal: mod assist  Long term bathing goal: min assist    Homemaking:   Current level: standby assist at wheelchair  level  Short term homemaking goal: met  Long term homemaking goal: met    Upper body dressing:  Current level: standby assist  Short term upper body dressing goal: standby assist  Long term upper body dressing goal: standby assist    Lower body dressing:                                                                          Current level: mod assist, refuses adaptive equipment          Short term lower body dressing goal: met          Long term lower body dressing goal: met            Footwear  Current level: mod assist  Short term goal: mod assist  Long term goal:mod assist      Toilet transfer:   Current level: wasn't doing at home, here he is min assist-mod assist with transfer board  to drop arm  commode  Short term toilet transfer goal: met  Long term toilet transfer goal: mod assist      Other comments: participates in recreation therapy      SPEECH THERAPY  Swallowing:  Current level:  dependent  Short term swallowing goal: min assist  Long term swallowing Goal: supervision    Comprehension:   Current level: supervision  Short term comprehension goal: Modified independent  Long term comprehension goal: Modified independent    Expression:   Current level: supervision  Short term expression goal: Modified independent  Long term expression goal: Modified independent    Problem solving:   Current level: min assist-mod assist  Short term problem solving goal: min assist  Long term problem solving goal: supervision    Memory:  Current level: min assist-mod assist  Short term memory goal: min assist  Long term memory goal: supervision    Social interaction:  Modified independent    Safety awareness: fair -      Patient/family's personal goals: get home  Factors supporting goal achievement:  supportive son, motivated  Factors hindering goal achievement: trach, non-oral feedings      Discharge Plan   Estimated Length of Stay: Friday 2/26/21        Destination: home  Services at Discharge: home health  for nursing, PT, OT, speech  Equipment at Discharge: bariatric drop arm commode, nebulizer, suction maching, trach supplies as directed per pulmonary  has a wheelchair and transfer board, ramp installed      INTERDISCIPLINARY TEAM/PHYSICIAN RECOMMENDATION AND/OR REVISIONS OF PLAN OF CARE:  finalize discharge plans for 2/26/21      I approve the established interdisciplinary plan of care as documented within the medical record of Yane Chen. Electronically signed by Nuha [place] RN  on 2/23/2021 at 8:33 AM  The following interdisciplinary team members were present:  ALLYN Saldana, MSSA,LSW  Manny Madrid.  Kati Co, DPT  Trey George, OTR  Yosi Neighbor, Krystal Alexis 87, CCC-SLP

## 2021-02-23 NOTE — PROGRESS NOTES
Educated pt's son on trach care, cleaning around trach site, and changing dressing. RN demonstrated care and talked pt through changing dressing. He stated understanding.

## 2021-02-23 NOTE — PROGRESS NOTES
OCCUPATIONAL THERAPY DAILY NOTE    Date:2021  Patient Name: Aviva León  MRN: [id number]  : 1945  Room: [address] [place], [place] [address]     Diagnosis: RF secondary to aspiration @ home, trach & peg  Additional Pertinent Hx: CHF, COPD, hx CVA, dysphagia, peg heart disease  Precautions: Falls, NPO, Peg    Functional Assessment:   Date Status AE  Comments   Feeding 21 NPO     Grooming 21 SBA/S seated To wipe off hands/face while seated up in w/c. Oral Care 21 SBA/S seated    Bathing 21 Mod A Seated    UB Dressing 21 SBA/S     LB Dressing 21 Mod A     Footwear 21 Mod A Seated     Toileting 2/3/21 Max A Bed level     Homemaking 21 SBA       Functional Transfers / Balance:   Date Status DME  Comments   Sit Balance 21 SBA  Sitting balance performed during w/c propelling using BUE/BLE's to increase strength & endurance for ADL's, transfers and mobility skills    Stand Balance 21 Mod A x2  21 Pt continues to require mod Assist x2 for standing due to overall weakness in BLE's per consult with patient. [] Tub  [] Shower   Transfer  TBA-  Pt sponge bathed at EOB PTA. Commode   Transfer 21 Mod/Min A Bariatric drop arm 3-in-1, transfer  sliding board    Functional   Mobility 21 SBA/S W/c Pt propelled w/c from room to gym using BUE/BLE's needing occ cues navigating thru doorways or around obstacles. Other:  Bed>W/c      Supine to EOB   21   Mod/Min A      SBA   Transfer board    Bed rail Pt Min A from bed to w/c with assist to place and remove bed.  Verbal cues for hand placement on board      Functional Exercises / Activity   BUE ROM and strength exercises : duane box with 8 # wt 3 sets 15 reps in all planes on table top surface                                                               Red can do bar 3 sets 15 reps   Sitting balance /endurance activity at table top level at SBA level     Pt completed w/c propelling from room to gym using BUE/BLE's to increase trunk control/core and BUE strength for ADL's, transfers and mobility skills at S/SBA. Pt engaged in simple grooming to wipe off face and hands seated up in w/c needing S/SBA. Pt tolerated 1# wts on BUE's during Adeyoh dice game while seated up at table top to increase strength/endurance along with cognitive retraining for problem solving with number values and coordination/fine motor skills. Sensory / Neuromuscular Re-Education:      Cognitive Skills:   Status Comments   Problem   Solving fair  During therapeutic activities and w/c propelling   Memory fair  \"   Sequencing fair  \"   Safety fair - Min cues for safety awareness/insight required using sliding board. Visual Perception:    Education:  Pt educated on safe transfers from bed to w/c using transfer board. Pt needed verbal cues for placement on hand on transfer board to avoid pinching fingers during transfer. [x] Family teach completed on: 2/17/21: No family present for scheduled FT session this date. 2/19/21: No family present for scheduled FT this AM. 2/22/21: Pt's son arrived at ~1320 for scheduled FT session this date. Discussed pt's current ADL level of assist and recommendation for transfer board transfer to [facility] for safety. Tatiana Teri reports that he was assisting pt with all bathing/dressing PTA. Pt completed transfer to/from [facility] with assist from therapist despite encouragement of son to provide hands on assist. Son stated multiple times that he just SPT's pt to the commode when needed and that PTA pt was utilizing urinal and would have BM in his depend d/t decreased awareness of bowel movements and that the son would provide hygiene to pt bed level following a BM. Son stated pt received a w/c cushion from [facility] and it's \"flat as a rock\". Pt's son reports no further questions/concerns. Pain Level: No c/o pain.     Additional Notes:    2/15/21: Dynamometer:R: 34        L: 10       9 Hole Peg: R: 49 Sec         L: 35 Sec    2/22/21: Dynamometer: R: 41#         L: 13#      MMT: R: NT d/t pain per pt request               L: 4-/5      Patient has made fair  progress during treatment sessions toward set goals. Therapy emphasis to obtain goals:  Current Treatment Recommendations: Strengthening, Gait Training, Patient/Caregiver Education & Training, Home Management Training, ROM, Equipment Evaluation, Education, & procurement, Balance Training, Functional Mobility Training, Endurance Training, Wheelchair Mobility Training, Safety Education & Training, Self-Care / ADL      Long term goal 2: Pt demo Mod I grooiming seated  Long term goal 3: Pt demo Mod A bathing @ sink level or shower level seated  Long term goal 4: Pt demo s/u UE & Min A LE dress to don depends, pants, socks & shoes using AE as needed  Long term goal 5: Pt demo Mod  A commode trf wtih appropriate DME to ensure pt safety  Long term goals 6: Pt demo Min-Mod A tub trf with appropriate DME to ensure pt safety- discontinued   Long term goal 7: Pt demo Sup light homemaking activity @ wc level  Long term goal 8: Pt demo G- endurance for a 20 minute funcitonal activity  Long term goal 9: Pt demo increase BUE strength to 4+/5 to improve pt ability to complete ADLs  Long term goal 10: Pt demo Mod I wc propulsion throughout a living environment & around obstacles    2/16/21: Pt plan of care updated on this date. Family teach scheduled for 2/19/21. Pt tentative length of stay is 2 weeks. 2/23/21- Pt plan of care updated on this date. Tentative discharge is 2/26/21  [x] Continue with current OT Plan of care.   [] Prepare for Discharge     DISCHARGE RECOMMENDATIONS  Recommended DME: transfer board, bariatric 3-in-1, w/c cushion for pressure relief    Post Discharge Care:   []Home Independently  [x]Home with 24hr Care / Supervision []Home with Partial Supervision []Home with Home Health OT []Home with Out Pt OT []Other: ___   Comments:         Time in Time out Tx Time Breakdown Variance:   First Session  [phone] [x] Individual  tx -35    [] Concurrent Tx-   [] Co-Tx -   [] Group Tx -   [] Time Missed -     Second Session 10:55am 11:40am [x] Individual Tx-45  [] Concurrent Tx -  [] Co-Tx -   [] Group Tx -   [] Time Missed -     Third Session    [] Individual Tx-   [] Concurrent Tx -  [] Co-Tx -   [] Group Tx -   [] Time Missed -         Total Tx Time: [address] [facility] OTR/L [address] [facility] SUZANNE KENDRICK

## 2021-02-23 NOTE — PROGRESS NOTES
Dr Alli segura served regarding orders needed and supplies list for patients at home trach care .  Message read, no news orders at this time

## 2021-02-23 NOTE — PROGRESS NOTES
Review of Systems   Constitutional: Positive for activity change and fatigue. Musculoskeletal: Positive for gait problem. Physical Exam  Constitutional:       Appearance: Normal appearance. Cardiovascular:      Rate and Rhythm: Normal rate. Heart sounds: Normal heart sounds. Pulmonary:      Effort: Pulmonary effort is normal.      Breath sounds: Normal breath sounds. Neurological:      Mental Status: He is alert.       Comments: 4/5 strength     Assessment metabolic encephalopathy  Plan patient is improving in therapy  Will review in team today  Plan is to leave the trach and PEG tube in place

## 2021-02-23 NOTE — CARE COORDINATION
Per Team discharge date 2/26. Updated the pt and the pt son Bert Sharma. Th ept will be discharging needing 24 hour hands on supervision. Bert Sharma will be providing the 24 hour care. LTG Min Assist / Mod I. The pt will be discharging at a wheelchair level. The pt son put in a ramp at the home. Nursing and Jennifer Reynaga are consulting with Pulmonary in regards to orders and supplies for trache.  set up an ambulate with Apryl at [place] [address] for 2/26 discharge at [address] [place] [address].     DME  Pt has a wheelchair and cushion and a slide board. Will also need bariatric 3 in 1 commode and trache care equipment once info is obtained. Aftercare:  Home Care for  trach, peg tube, PT, OT, SP, Aid, Nursing     Referral made to Gary Lutz at [facility] for [facility] declined. [facility] - [place] - per [facility] they are unable to staff. [phone] per Johnny Foley. Faxed [phone]   Faxed referral - they can plan start of care 2/27. Will need to send more specific orders once obtained from Pulmonary. FT  Pt son Bert Sharma attended on 2/22. The Plan for Transition of Care is related to the following treatment goals: home    The Patient and/or patient representative son was provided with a choice of provider and agrees   with the discharge plan. [x] Yes [] No    Freedom of choice list was provided with basic dialogue that supports the patient's individualized plan of care/goals, treatment preferences and shares the quality data associated with the providers.  [x] Yes [] No      Suann Felty SW Intern  Chrissy Ramos  2/23/2021

## 2021-02-23 NOTE — PROGRESS NOTES
Speech Language Pathology  ACUTE REHABILITATION--DAILY PROGRESS NOTE          ADMITTING DIAGNOSIS: Acute metabolic encephalopathy [I82.99]     SPEECH PATHOLOGY DIAGNOSIS:    Mild-moderate cognitive deficit. 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 Botox received in 2008 with short term success). Decreased intelligibility due to weakness, missing dentition, fast rate and resonance issues.     THERAPY RECOMMENDATIONS:   Speech Pathology intervention is recommended 3-6 times per week for LOS or when goals are met with emphasis on the following:      Improve STM recall, sequencing and problem solving for increased functional independence with completion of ADLs/IADLs to a FIM level commensurate with modified independent.                                                                                                                                         FIMS SCORES                            Swallowing                          Current Status             1--Total Assistance                   Short Term Goal         4--Minimal Assistance               Long Term Goal          5--Supervision      Receptive                          Current Status             5--Supervision               Short Term Goal         6--Modified Independent                       Long Term Goal          6--Modified Independent              Expressive                          Current Status             5--Supervision               Short Term Goal         6--Modified Independent                       Long Term Goal          6--Modified Independent              Social Interaction                          Current Status             6--Modified Independent                       Short Term Goal         6--Modified Independent                       Long Term Goal          6--Modified Independent                                                             Problem Solving                          Current Status            4--Minimal Assistance / 3--Moderate Assistance               Short Term Goal          4--Minimal Assistance              Long Term Goal            5--Supervision                        Memory                          Current Status            4--Minimal Assistance  / 3--Moderate Assistance                     Short Term Goal         4--Minimal Assistance   Long Term Goal          5--Supervision                                              SWALLOWING:      Diet:  NPO (nothing by mouth including oral meds). PEG tube in place. Chronic dysphagia reported but inconsistent information provided by patient. Lengthy case history taken by other SLP from patient and from pulmonary NP- see MBS report. Note from 2/4: Speech Pathologist (SLP) completed education with the patient/family regarding procedure of Modified Barium Swallow Study prior to exam and then type of swallowing impairment following completion of MBSS. Reviewed current solid/liquid consistency diet recommendations (NPO (nothing by mouth including oral meds). Discussed strategies trialed (as previously used by Pt) with poor outcome and continued aspiration. Images from MBSS reviewed with patient/ family and education provided. Reviewed aspiration precautions. Encouraged patient and/or family to engage SLP in unstructured Q&A session relative to identified deficit areas; indicated understanding of all information provided via satisfactory verbal response. LANGUAGE:     Patient benefited from minimal encouragement to converse with clinician and other patient. Terminated conversation turns appropriately. COGNITION:        Patient participated in social cognitive linguistic activity in which he recalled stories related to given topic. Patient had fair+ recall needed to provide information related to target topic and organized thoughts with fair outcome.       Safety:  Fair-   Other therapies (PT, OT) report concerns with safety. SPEECH:     Difficult to understand at times due to facial weakness and absence of teeth. SP recommended after discharge: yes  Supervision recommended at discharge: 24 hour    Will continue SP intervention as per previously established POC.     Minute Tracking:    Individual therapy:     0 minutes  Concurrent therapy:  45  minutes  Group therapy:     0 minutes  Co-treatment therapy:    0 minutes    Total minutes for 2/23/2021: 45 minutes      Tiana Man   SLP student intern

## 2021-02-24 PROCEDURE — 97130 THER IVNTJ EA ADDL 15 MIN: CPT

## 2021-02-24 PROCEDURE — 6370000000 HC RX 637 (ALT 250 FOR IP): Performed by: PHYSICAL MEDICINE & REHABILITATION

## 2021-02-24 PROCEDURE — 97129 THER IVNTJ 1ST 15 MIN: CPT

## 2021-02-24 PROCEDURE — 97110 THERAPEUTIC EXERCISES: CPT

## 2021-02-24 PROCEDURE — 1280000000 HC REHAB R&B

## 2021-02-24 PROCEDURE — 94640 AIRWAY INHALATION TREATMENT: CPT

## 2021-02-24 PROCEDURE — 97530 THERAPEUTIC ACTIVITIES: CPT

## 2021-02-24 PROCEDURE — 97535 SELF CARE MNGMENT TRAINING: CPT

## 2021-02-24 RX ADMIN — METOPROLOL TARTRATE 25 MG: 25 TABLET, FILM COATED ORAL at 20:00

## 2021-02-24 RX ADMIN — APIXABAN 5 MG: 5 TABLET, FILM COATED ORAL at 20:00

## 2021-02-24 RX ADMIN — ACETAMINOPHEN 650 MG: 325 TABLET, FILM COATED ORAL at 08:35

## 2021-02-24 RX ADMIN — METOPROLOL TARTRATE 25 MG: 25 TABLET, FILM COATED ORAL at 08:35

## 2021-02-24 RX ADMIN — ASPIRIN 81 MG: 81 TABLET, CHEWABLE ORAL at 08:35

## 2021-02-24 RX ADMIN — IPRATROPIUM BROMIDE AND ALBUTEROL SULFATE 1 AMPULE: 2.5; .5 SOLUTION RESPIRATORY (INHALATION) at 20:32

## 2021-02-24 RX ADMIN — CHLORHEXIDINE GLUCONATE 0.12% ORAL RINSE 15 ML: 1.2 LIQUID ORAL at 08:35

## 2021-02-24 RX ADMIN — IPRATROPIUM BROMIDE AND ALBUTEROL SULFATE 1 AMPULE: 2.5; .5 SOLUTION RESPIRATORY (INHALATION) at 16:40

## 2021-02-24 RX ADMIN — RIFAXIMIN 550 MG: 550 TABLET ORAL at 20:00

## 2021-02-24 RX ADMIN — PANTOPRAZOLE SODIUM 40 MG: 40 TABLET, DELAYED RELEASE ORAL at 06:18

## 2021-02-24 RX ADMIN — MINERAL OIL AND PETROLATUM: 150; 830 OINTMENT OPHTHALMIC at 08:36

## 2021-02-24 RX ADMIN — ACETAMINOPHEN 650 MG: 325 TABLET, FILM COATED ORAL at 20:06

## 2021-02-24 RX ADMIN — MINERAL OIL AND WHITE PETROLATUM: 150; 830 OINTMENT OPHTHALMIC at 20:15

## 2021-02-24 RX ADMIN — IPRATROPIUM BROMIDE AND ALBUTEROL SULFATE 1 AMPULE: 2.5; .5 SOLUTION RESPIRATORY (INHALATION) at 14:00

## 2021-02-24 RX ADMIN — APIXABAN 5 MG: 5 TABLET, FILM COATED ORAL at 08:35

## 2021-02-24 RX ADMIN — RIFAXIMIN 550 MG: 550 TABLET ORAL at 08:36

## 2021-02-24 ASSESSMENT — PAIN DESCRIPTION - PROGRESSION: CLINICAL_PROGRESSION: GRADUALLY IMPROVING

## 2021-02-24 ASSESSMENT — PAIN SCALES - GENERAL
PAINLEVEL_OUTOF10: 0
PAINLEVEL_OUTOF10: 2
PAINLEVEL_OUTOF10: 0

## 2021-02-24 NOTE — PROGRESS NOTES
Speech Language Pathology  ACUTE REHABILITATION--DAILY PROGRESS NOTE          ADMITTING DIAGNOSIS: Acute metabolic encephalopathy [M61.93]     SPEECH PATHOLOGY DIAGNOSIS:    Mild-moderate cognitive deficit. 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 Botox received in 2008 with short term success). Decreased intelligibility due to weakness, missing dentition, fast rate and resonance issues.     THERAPY RECOMMENDATIONS:   Speech Pathology intervention is recommended 3-6 times per week for LOS or when goals are met with emphasis on the following:      Improve STM recall, sequencing and problem solving for increased functional independence with completion of ADLs/IADLs to a FIM level commensurate with modified independent.                                                                                                                                         FIMS SCORES                            Swallowing                          Current Status             1--Total Assistance                   Short Term Goal         4--Minimal Assistance               Long Term Goal          5--Supervision      Receptive                          Current Status            6--Modified Independent             Short Term Goal         6--Modified Independent                       Long Term Goal          6--Modified Independent              Expressive                          Current Status             6--Modified Independent              Short Term Goal         6--Modified Independent                       Long Term Goal          6--Modified Independent              Social Interaction                          Current Status             6--Modified Independent                       Short Term Goal         6--Modified Independent                       Long Term Goal          6--Modified Independent Problem Solving                          Current Status            4--Minimal Assistance                      Short Term Goal         5--Supervision     Long Term Goal          5--Supervision                            Memory                          Current Status            4--Minimal Assistance                      Short Term Goal         5--Supervision     Long Term Goal          5--Supervision                                              SWALLOWING:      Diet:  NPO (nothing by mouth including oral meds). PEG tube in place. Chronic dysphagia reported but inconsistent information provided by patient. Lengthy case history taken by other SLP from patient and from pulmonary NP- see MBS report. Note from 2/4: Speech Pathologist (SLP) completed education with the patient/family regarding procedure of Modified Barium Swallow Study prior to exam and then type of swallowing impairment following completion of MBSS. Reviewed current solid/liquid consistency diet recommendations (NPO (nothing by mouth including oral meds). Discussed strategies trialed (as previously used by Pt) with poor outcome and continued aspiration. Images from MBSS reviewed with patient/ family and education provided. Reviewed aspiration precautions. Encouraged patient and/or family to engage SLP in unstructured Q&A session relative to identified deficit areas; indicated understanding of all information provided via satisfactory verbal response. LANGUAGE:       Functional given increased time    COGNITION:        Pt participated structured activity where the pt read a paragraph and then answered various Y/N and [address]- questions after. Pt completed paragraph retention activities with 100% accuracy given inconsistent verbal cues. Pt also participated in a reading a calendar activity, which he completed with 90% accuracy. Pt required consistent verbal reminder that 'prior' means 'before'. Pt participated in a recalling patterns activity.  Pt

## 2021-02-24 NOTE — PROGRESS NOTES
Physical Therapy  Treatment Note  Evaluating Therapist: Charisse Lagunas DPT    NAME: Chidi Schroeder  ROOM: [id number]  DIAGNOSIS: Acute metabolic encephalopathy  PRECAUTIONS: Falls, PEG tube, trach (capped), L hemiparesis, LE proprioceptive deficits  HPI: 76year old male admitted to [facility] on 11/18/20 with respiratory failure secondary to witnessed aspiration event at home - chronic dysphagia with feeding tube. Intubated twice at [facility] with subsequent trach placement. Admitted to [facility] for vent weaning. Transferred to ARU 2/1. Social:  Pt lives with his son in a trailer with 8 steps with B rails or a ramp to enter. Prior to admission pt ambulated with [address] Mod I, however this information was obtained from an old note in his chart. It is currently believed that the pt was not independent and rather required assistance with all activities. 2/22/21 Pt's son confirmed that the pt required near Max A for half stand pivot transfers at home prior to his most recent hospital admission. Initial Evaluation  2/3/21 AM     PM    Short Term Goals Long Term Goals    Was pt agreeable to Eval/treatment? yes yes yes     Does pt have pain? L wrist and R shoulder pain L wrist and B shoulder pain L wrist and B shoulder pain     Bed Mobility  Rolling: SBA  Supine to sit: SBA  Sit to supine: SBA  Scooting: SBA Independent all aspects Independent all aspects Supervision Independent   Transfers Sit to stand: Max A x2  Stand to sit: Max A x2  Stand pivot: NT  Slideboard: Max A Sit to stand: Max A  Stand to sit: Max A  Stand pivot: NT  Slideboard: Min A mat table<>wheelchair Sit to stand: Max A  Stand to sit:  Max A  Stand pivot: NT   Min A SBA   Ambulation   NT 10 feet x2 reps  in parallel bars with Mod/Max A + WC follow NT     Walking 10 feet on uneven surface NT NT NT     Wheel Chair Mobility 100 feet  feet Mod Ind 150 feet Mod I  Ramp Supervision >200 feet Supervision >200 feet Independent   Car Transfers NT NT NT Min A SBA   Stair negotiation: ascended and descended NT NT NT     Curb Step:   ascended and descended NT NT NT     Picking up object off the floor NT NT NT     BLE ROM WFL WFL      BLE Strength RLE 4/5  LLE 3+/5 RLE 4/5  LLE 3+/5      Balance  Sitting: SBA   Static stand: Max A x2 Sitting: Independent  Static stand: Max A       Date Family Teach Completed TBA Son on 2/22/21; Son was a no-show for family training 3 times      Is additional Family Teaching Needed? Y or N Y N      Hindering Progress Debility, L hemiparesis Debility, L hemiparesis      PT recommended ELOS 4-5 weeks       Team's Discharge Plan        Therapist at Team Meeting          Therapeutic Exercise:   AM:   - Multiple sit<>stands in parallel bars  - Standing knee bends in parallel bars 2x10 to improve closed chain knee extensor strength  PM:  - Sit<> parallel bars with x5 knee bends to improve closed chain knee extensor strength  -sit <. Stand x4 reps in parallel bars. Additional Comments: Pt continues to have significant functional deficits as a result of the pt's debility and impaired strength levels. Pt continues to ambulate with a forward flexed posture, B flexed knees, and shuffling gait. Bilateral knee block provided from PT during ambulation. Pt still requires heavy assist during sit <> stand transfers a.m. and p.m. Pt motivated though. Pt still requires assistance for sliding board transfers. Pt Supervision with w/c mobility up and down ramp. Patient/family education  Pt/family educated on safety, slideboard recommendation     Patient/family response to education:   Pt/family verbalized understanding Pt/family demonstrated skill Pt/family requires further education in this area   yes With cues/assist yes     AM  Time in: 0915  Time out: 1000  PM  Time in: 1515  Time out: 1600    Pt is making fair progress toward established Physical Therapy goals. Continue with physical therapy current plan of care.     Mara Wiggins [id number]

## 2021-02-24 NOTE — PROGRESS NOTES
Progress Note  Date:2021       Room:5508/5508-B  Patient Name:Isael Rachel     Date of Birth:     Age:76 y.o. Subjective    Subjective:  Symptoms:  Stable. He reports weakness. Diet:  Adequate intake. Activity level: Impaired due to weakness. Pain:  He reports no pain. Review of Systems   Neurological: Positive for weakness. Objective         Vitals Last 24 Hours:  TEMPERATURE:  Temp  Av.7 °F (36.5 °C)  Min: 97.7 °F (36.5 °C)  Max: 97.7 °F (36.5 °C)  RESPIRATIONS RANGE: Resp  Av  Min: 18  Max: 18  PULSE OXIMETRY RANGE: SpO2  Av.5 %  Min: 96 %  Max: 97 %  PULSE RANGE: Pulse  Av  Min: 64  Max: 90  BLOOD PRESSURE RANGE: Systolic (26EBT), EBT:129 , Min:98 , XQ   ; Diastolic (97MFP), LEK:95, Min:56, Max:62    I/O (24Hr): Intake/Output Summary (Last 24 hours) at 2021 0753  Last data filed at 2021 0618  Gross per 24 hour   Intake 1820 ml   Output 950 ml   Net 870 ml     Objective:  General Appearance: In no acute distress. Vital signs: (most recent): Blood pressure 110/62, pulse 64, temperature 97.7 °F (36.5 °C), temperature source Temporal, resp. rate 18, height 6' 3\" (1.905 m), weight 212 lb 8 oz (96.4 kg), SpO2 97 %. Vital signs are normal.    Output: Producing urine and producing stool. HEENT: (Trach)    Lungs:  Normal effort and normal respiratory rate. Breath sounds clear to auscultation. Heart: Normal rate. Regular rhythm. S1 normal and S2 normal.    Abdomen: Abdomen is soft. Bowel sounds are normal.   There is no abdominal tenderness. Extremities: Normal range of motion. Neurological: Patient is alert. (4/5 strength). Labs/Imaging/Diagnostics    Labs:  CBC:No results for input(s): WBC, RBC, HGB, HCT, MCV, RDW, PLT in the last 72 hours. CHEMISTRIES:No results for input(s): NA, K, CL, CO2, BUN, CREATININE, GLUCOSE, PHOS, MG in the last 72 hours.     Invalid input(s): CA  PT/INR:No results for input(s): encephalopathy). Plan:   Encourage ambulation. (I spoke at length with the son yesterday  He is done the tube feedings at home before  The trach is new but he feels he can manage that with further instructions  He is not been able to do functional ambulating  Son has had them at home at a wheelchair level before as well  I think this is going to work out for discharge on Friday).        Electronically signed by Blanca Pratt MD on 2/24/21 at 7:53 AM EST

## 2021-02-24 NOTE — PROGRESS NOTES
OCCUPATIONAL THERAPY DAILY NOTE    Date:2021  Patient Name: Caitlyn De Leon  MRN: [id number]  : 1945  Room: [address] [place], [place] [address]     Diagnosis: RF secondary to aspiration @ home, trach & peg  Additional Pertinent Hx: CHF, COPD, hx CVA, dysphagia, peg heart disease  Precautions: Falls, NPO, Peg    Functional Assessment:   Date Status AE  Comments   Feeding 21 NPO     Grooming 21 SBA/S seated          Oral Care 21 SBA/S seated    Bathing 21 Mod A Seated    UB Dressing 21 SBA/S  Cj/doff pullover shirt   LB Dressing 21 Mod A     Footwear 21 Mod A Seated  Pt able to cj/doff L sock and shoe using cross over technique, with assistance to R foot   Toileting 2/3/21 Max A Bed level     Homemaking 21 SBA       Functional Transfers / Balance:   Date Status DME  Comments   Sit Balance 21 SBA  Pt sat supported upright in w/c   Stand Balance 21 Mod A x2     [] Tub  [] Shower   Transfer  TBA-  Pt sponge bathed at EOB PTA. Commode   Transfer 21 Mod/Min A Bariatric drop arm 3-in-1, transfer  sliding board    Functional   Mobility 21 SBA/S W/c Pt self propelled around objects within sánchez   Other:  Bed>W/c      Supine to EOB   21   Mod/Min A      SBA   Transfer board    Bed rail      Functional Exercises / Activity   BUE ROM and strength exercises: arm bike 5 mins for 3 reps   Pt completed copy design 3 D block pattern at supervision level. Pt able to complete basic patterns and complex patterns with increased time    -6lb duane box acitivity for 10reps x2 in all planes, with rest breaks, focusing on increasing of activity tolerance, strength and ROM of BUE's, core strengthening  -Hand gripper for ~50reps per hand, focusing on increasing of  strength of BUE's  -Dynamic sitting activity of bean bag toss, reaching across midline to retrieve of bags, tossing into bucket, focusing on increasing of ROM of BUE's, reaching across midline, sitting balance, activity tolerance. -W/C mobility manuvering in around objects, completing of obstacle, with use of BUE's, focusing on increasing of strength and ROM of BUE's, independency within w/c level      Sensory / Neuromuscular Re-Education:      Cognitive Skills:   Status Comments   Problem   Solving fair  During therapeutic activities and w/c propelling   Memory fair  \"   Sequencing fair  \"   Safety fair - Min cues for safety awareness/insight required using sliding board. Visual Perception:    Education:  Pt educated on cross over technique to assist with LB dressing tasks, attempting to educate pt on use of AE, with pt denying stating of having son at home to assist with task     [x] Family teach completed on: 2/17/21: No family present for scheduled FT session this date. 2/19/21: No family present for scheduled FT this AM. 2/22/21: Pt's son arrived at ~1320 for scheduled FT session this date. Discussed pt's current ADL level of assist and recommendation for transfer board transfer to [facility] for safety. Barbara Carlin reports that he was assisting pt with all bathing/dressing PTA. Pt completed transfer to/from [facility] with assist from therapist despite encouragement of son to provide hands on assist. Son stated multiple times that he just SPT's pt to the commode when needed and that PTA pt was utilizing urinal and would have BM in his depend d/t decreased awareness of bowel movements and that the son would provide hygiene to pt bed level following a BM. Son stated pt received a w/c cushion from [facility] and it's \"flat as a rock\". Pt's son reports no further questions/concerns. Pain Level: No c/o pain. Additional Notes:    2/15/21: Dynamometer:R: 34        L: 10       9 Hole Peg: R: 49 Sec         L: 35 Sec    2/22/21: Dynamometer: R: 41#         L: 13#      MMT: R: NT d/t pain per pt request               L: 4-/5      Patient has made fair  progress during treatment sessions toward set goals.  Therapy emphasis to obtain goals:  Current Treatment Recommendations: Strengthening, Gait Training, Patient/Caregiver Education & Training, Home Management Training, ROM, Equipment Evaluation, Education, & procurement, Balance Training, Functional Mobility Training, Endurance Training, Wheelchair Mobility Training, Safety Education & Training, Self-Care / ADL      Long term goal 2: Pt demo Mod I grooiming seated  Long term goal 3: Pt demo Mod A bathing @ sink level or shower level seated  Long term goal 4: Pt demo s/u UE & Min A LE dress to don depends, pants, socks & shoes using AE as needed  Long term goal 5: Pt demo Mod  A commode trf wtih appropriate DME to ensure pt safety  Long term goals 6: Pt demo Min-Mod A tub trf with appropriate DME to ensure pt safety- discontinued   Long term goal 7: Pt demo Sup light homemaking activity @ wc level  Long term goal 8: Pt demo G- endurance for a 20 minute funcitonal activity  Long term goal 9: Pt demo increase BUE strength to 4+/5 to improve pt ability to complete ADLs  Long term goal 10: Pt demo Mod I wc propulsion throughout a living environment & around obstacles    2/16/21: Pt plan of care updated on this date. Family teach scheduled for 2/19/21. Pt tentative length of stay is 2 weeks. 2/23/21- Pt plan of care updated on this date. Tentative discharge is 2/26/21  [x] Continue with current OT Plan of care.   [] Prepare for Discharge     DISCHARGE RECOMMENDATIONS  Recommended DME: transfer board, bariatric 3-in-1, w/c cushion for pressure relief    Post Discharge Care:   []Home Independently  [x]Home with 24hr Care / Supervision []Home with Partial Supervision []Home with Home Health OT []Home with Out Pt OT []Other: ___   Comments:         Time in Time out Tx Time Breakdown  Variance:   First Session  [id number]  [x] Individual  tx -30     [] Concurrent Tx-   [] Co-Tx -   [] Group Tx -   [x] Time Missed -15          Pt with Nursing    Second Session 1:00pm 1:45pm [x] Individual Tx- 45mins  [] Concurrent Tx -  [] Co-Tx -   [] Group Tx -   [] Time Missed -     Third Session  1:45pm 2:15pm [x] Individual Tx- 30mins   [] Concurrent Tx -  [] Co-Tx -   [] Group Tx -   [] Time Missed -         Total Tx Time: 105mins     Alysa Block OTR/L [id number]   Reyes Karstenabraham [address] ABDULLAHI/L [id number]

## 2021-02-24 NOTE — PROGRESS NOTES
DISCHARGE SUMMARY    Group interaction skills/socialization:  Victorino Richardson displayed social interaction skills at the complete independence. Leisure participation/awareness:  Victorino Richardson participated in 4 therapeutic recreation interventions identifying 6 benefits to leisure participation.     Other:     Outcomes: goals achieved      Electronically signed by Cyndee Conroy on 2/24/2021 at 1:38 PM

## 2021-02-25 PROCEDURE — 94760 N-INVAS EAR/PLS OXIMETRY 1: CPT

## 2021-02-25 PROCEDURE — 97110 THERAPEUTIC EXERCISES: CPT

## 2021-02-25 PROCEDURE — 97130 THER IVNTJ EA ADDL 15 MIN: CPT

## 2021-02-25 PROCEDURE — 94640 AIRWAY INHALATION TREATMENT: CPT

## 2021-02-25 PROCEDURE — 97530 THERAPEUTIC ACTIVITIES: CPT

## 2021-02-25 PROCEDURE — 92526 ORAL FUNCTION THERAPY: CPT

## 2021-02-25 PROCEDURE — 97535 SELF CARE MNGMENT TRAINING: CPT

## 2021-02-25 PROCEDURE — 6370000000 HC RX 637 (ALT 250 FOR IP): Performed by: PHYSICAL MEDICINE & REHABILITATION

## 2021-02-25 PROCEDURE — 97129 THER IVNTJ 1ST 15 MIN: CPT

## 2021-02-25 PROCEDURE — 1280000000 HC REHAB R&B

## 2021-02-25 RX ADMIN — Medication: at 08:40

## 2021-02-25 RX ADMIN — IPRATROPIUM BROMIDE AND ALBUTEROL SULFATE 1 AMPULE: 2.5; .5 SOLUTION RESPIRATORY (INHALATION) at 21:31

## 2021-02-25 RX ADMIN — METOPROLOL TARTRATE 25 MG: 25 TABLET, FILM COATED ORAL at 08:40

## 2021-02-25 RX ADMIN — APIXABAN 5 MG: 5 TABLET, FILM COATED ORAL at 20:17

## 2021-02-25 RX ADMIN — ASPIRIN 81 MG: 81 TABLET, CHEWABLE ORAL at 08:39

## 2021-02-25 RX ADMIN — IPRATROPIUM BROMIDE AND ALBUTEROL SULFATE 1 AMPULE: 2.5; .5 SOLUTION RESPIRATORY (INHALATION) at 08:18

## 2021-02-25 RX ADMIN — IPRATROPIUM BROMIDE AND ALBUTEROL SULFATE 1 AMPULE: 2.5; .5 SOLUTION RESPIRATORY (INHALATION) at 12:35

## 2021-02-25 RX ADMIN — IPRATROPIUM BROMIDE AND ALBUTEROL SULFATE 1 AMPULE: 2.5; .5 SOLUTION RESPIRATORY (INHALATION) at 16:37

## 2021-02-25 RX ADMIN — Medication: at 20:25

## 2021-02-25 RX ADMIN — MINERAL OIL AND WHITE PETROLATUM: 150; 830 OINTMENT OPHTHALMIC at 20:17

## 2021-02-25 RX ADMIN — RIFAXIMIN 550 MG: 550 TABLET ORAL at 08:39

## 2021-02-25 RX ADMIN — METOPROLOL TARTRATE 25 MG: 25 TABLET, FILM COATED ORAL at 20:16

## 2021-02-25 RX ADMIN — RIFAXIMIN 550 MG: 550 TABLET ORAL at 20:16

## 2021-02-25 RX ADMIN — APIXABAN 5 MG: 5 TABLET, FILM COATED ORAL at 08:39

## 2021-02-25 ASSESSMENT — PAIN SCALES - GENERAL
PAINLEVEL_OUTOF10: 0
PAINLEVEL_OUTOF10: 10

## 2021-02-25 NOTE — PROGRESS NOTES
Physical Therapy  Treatment Note  Evaluating Therapist: Carey Aguilera DPT    NAME: Selena Reddy  ROOM: 1897  DIAGNOSIS: Acute metabolic encephalopathy  PRECAUTIONS: Falls, PEG tube, trach (capped), L hemiparesis, LE proprioceptive deficits  HPI: 76year old male admitted to [facility] on 11/18/20 with respiratory failure secondary to witnessed aspiration event at home - chronic dysphagia with feeding tube. Intubated twice at [facility] with subsequent trach placement. Admitted to [facility] for vent weaning. Transferred to ARU 2/1. Social:  Pt lives with his son in a trailer with 8 steps with B rails or a ramp to enter. Prior to admission pt ambulated with [employer] Mod I, however this information was obtained from an old note in his chart. It is currently believed that the pt was not independent and rather required assistance with all activities. 2/22/21 Pt's son confirmed that the pt required near Max A for half stand pivot transfers at home prior to his most recent hospital admission. Initial Evaluation  2/3/21 AM     PM    Short Term Goals Long Term Goals    Was pt agreeable to Eval/treatment? yes yes yes     Does pt have pain? L wrist and R shoulder pain L wrist and B shoulder pain L wrist and B shoulder pain     Bed Mobility  Rolling: SBA  Supine to sit: SBA  Sit to supine: SBA  Scooting: SBA Independent all aspects Independent all aspects Supervision Independent   Transfers Sit to stand: Max A x2  Stand to sit: Max A x2  Stand pivot: NT  Slideboard: Max A Sit to stand: Max A  Stand to sit: Max A  Stand pivot: NT  Slideboard: Min A mat table<>wheelchair Sit to stand: Max A  Stand to sit:  Max A  Stand pivot: NT  Slideboard: Min A mat table<>wheelchair Min A SBA   Ambulation   NT 10 feet x3 reps in parallel bars with Mod A + WC follow 10 feet in parallel bars with Mod A + WC follow     Walking 10 feet on uneven surface NT NT NT     Wheel Chair Mobility 100 feet  feet Mod I 150 feet Mod I >200 feet Supervision >200 requires further education in this area   yes With cues/assist yes     AM  Time in: 0915  Time out: 1000  PM  Time in: 1515  Time out: 1600    Pt is making fair progress toward established Physical Therapy goals. Continue with physical therapy current plan of care.     Mariella Johnson DPT [id number]

## 2021-02-25 NOTE — PROGRESS NOTES
Progress Note  Date:2021       Room:5508/5508-B  Patient Name:Isael Rachel     Date of Birth:     Age:76 y.o. Subjective    Subjective:  Symptoms:  Stable. He reports weakness. Diet:  NPO. Activity level: Impaired due to weakness. Pain:  He reports no pain. Review of Systems   Neurological: Positive for weakness. Objective         Vitals Last 24 Hours:  TEMPERATURE:  Temp  Av.5 °F (36.4 °C)  Min: 97.3 °F (36.3 °C)  Max: 97.9 °F (36.6 °C)  RESPIRATIONS RANGE: Resp  Av.2  Min: 17  Max: 20  PULSE OXIMETRY RANGE: SpO2  Av %  Min: 94 %  Max: 100 %  PULSE RANGE: Pulse  Av  Min: 72  Max: 112  BLOOD PRESSURE RANGE: Systolic (32YYE), IIR:275 , Min:98 , BOZ:359   ; Diastolic (66LUY), DMS:78, Min:63, Max:73    I/O (24Hr): Intake/Output Summary (Last 24 hours) at 2021 0841  Last data filed at 2021 0606  Gross per 24 hour   Intake 1360 ml   Output 725 ml   Net 635 ml     Objective:  General Appearance: In no acute distress. Vital signs: (most recent): Blood pressure 111/73, pulse 112, temperature 97.9 °F (36.6 °C), temperature source Temporal, resp. rate 18, height 6' 3\" (1.905 m), weight 212 lb 8 oz (96.4 kg), SpO2 100 %. Vital signs are normal.    Output: Producing urine and producing stool. HEENT: (Trach)    Lungs:  Normal effort and normal respiratory rate. Breath sounds clear to auscultation. Heart: Normal rate. Regular rhythm. S1 normal.    Abdomen: Abdomen is soft. Bowel sounds are normal.   There is no abdominal tenderness. Neurological: Patient is alert. (4/5 strength). Labs/Imaging/Diagnostics    Labs:  CBC:No results for input(s): WBC, RBC, HGB, HCT, MCV, RDW, PLT in the last 72 hours. CHEMISTRIES:No results for input(s): NA, K, CL, CO2, BUN, CREATININE, GLUCOSE, PHOS, MG in the last 72 hours. Invalid input(s): CA  PT/INR:No results for input(s): PROTIME, INR in the last 72 hours.   APTT:No results for input(s): APTT in the last 72 hours. LIVER PROFILE:No results for input(s): AST, ALT, BILIDIR, BILITOT, ALKPHOS in the last 72 hours. Imaging Last 24 Hours:  No results found. Assessment//Plan           Hospital Problems           Last Modified POA    Acute metabolic encephalopathy 3/3/6808 Yes    Severe protein-calorie malnutrition (HCC) (Chronic) 2/4/2021 Yes        Assessment:    Condition: In stable condition. Improving. (Metabolic encephalopathy). Plan:   Encourage ambulation. (Planning discharge home with his son tomorrow  He will go home with the trach and tube feedings  Because of the distance I am not going to see him for follow-up but he will follow-up with his primary care physician  I will look at getting everything ready for tomorrow).        Electronically signed by Kennedi Delgadillo MD on 2/25/21 at 8:41 AM EST

## 2021-02-25 NOTE — PROGRESS NOTES
OCCUPATIONAL THERAPY DAILY NOTE    Date:2021  Patient Name: Negar Berg  MRN: [id number]  : 1945  Room: [address] [place], [place] [address]     Diagnosis: RF secondary to aspiration @ home, trach & peg  Additional Pertinent Hx: CHF, COPD, hx CVA, dysphagia, peg heart disease  Precautions: Falls, NPO, Peg    Functional Assessment:   Date Status AE  Comments   Feeding 21 NPO     Grooming 21 Supervision seated Seated at EOB pt washed face/hands. Oral Care 21 SBA seated 21: Pt continues to defer oral care during morning ADL tx sessions. Pt stated \"I did not already\" when initially prompted by therapist and later stated he doesn't do oral care because \"I can't swallow liquids anyway\". Bathing 21 Mod A Seated Sponge bath completed seated at EOB and in supine. Pt requires assist to wash R LE below knee, philip area/buttocks and L upper arm d/t limited ROM in R UE. Pt declined LH sponge again this date. UB Dressing 21 Supervision  To don/doff pull over shirt seated at EOB. LB Dressing 21 Mod A  To don brief and pants with assist required to thread R LE and to manage clothing over hips in supine. Pt demo'd increased ability to manage clothing over hips, still required assist to complete activity. Pt continued to defer LB AE this date. Footwear 21 Mod A Seated  To don/doff socks and don slippers seated at EOB utilizing leg cross over technique. Assist required to don R sock and shoe d/t limited ROM. Fair tolerance. Toileting 2/3/21 Max A Bed level  Pt deferred toileting needs this date. Homemaking 21 SBA       Functional Transfers / Balance:   Date Status DME  Comments   Sit Balance 21 SBA/S  Seated at EOB during dynamic ADL tasks. Stand Balance 21 Mod A x2     [] Tub  [] Shower   Transfer  TBA-  Pt sponge bathed at EOB PTA.      Commode   Transfer 21 Mod/Min A Bariatric drop arm 3-in-1, transfer  sliding board    Functional   Mobility 21 Supervision W/c Min cues for w/c safety. Other:  Bed>W/c      Supine to EOB   2/25/21 2/25/21   Min A      SBA/S   Transfer board    Bed rail   Assist required for board placement and safety during transfer. For safety. Functional Exercises / Activity  -Medium resistance putty/wheel activity completed seated at table with focus on increasing B UE strength/endurance for functional activities. Fair tolerance.   -Towel stretches completed at tabletop with focus on B shoulder AROM and providing gentle stretching. Fair tolerance.    -Functional reaching activity completed seated at table with focus on trunk control/core strength and increasing B UE ROM and FMC in L hand with fair tolerance. -Yellow Candobar completed x40 in all planes to increase B UE strength and overall endurance for functional activities. Fair tolerance. Sensory / Neuromuscular Re-Education:      Cognitive Skills:   Status Comments   Problem   Solving fair  During therapeutic activities and w/c propelling   Memory fair  \"   Sequencing fair  \"   Safety fair - Min cues for safety awareness/insight required using sliding board. Visual Perception:    Education:  Pt educated on adaptive techniques for ADL tasks, pt verbalized/demonstrated fair- understanding, recommend continued education. [x] Family teach completed on: 2/17/21: No family present for scheduled FT session this date. 2/19/21: No family present for scheduled FT this AM. 2/22/21: Pt's son arrived at ~1320 for scheduled FT session this date. Discussed pt's current ADL level of assist and recommendation for transfer board transfer to [facility] for safety. Christofer Denis reports that he was assisting pt with all bathing/dressing PTA.  Pt completed transfer to/from [facility] with assist from therapist despite encouragement of son to provide hands on assist. Son stated multiple times that he just SPT's pt to the commode when needed and that PTA pt was utilizing urinal and would have BM in his depend d/t decreased awareness of bowel movements and that the son would provide hygiene to pt bed level following a BM. Son stated pt received a w/c cushion from [facility] and it's \"flat as a rock\". Pt's son reports no further questions/concerns. Pain Level: No c/o pain. Additional Notes:    2/15/21: Dynamometer:R: 34        L: 10       9 Hole Peg: R: 49 Sec         L: 35 Sec    2/22/21: Dynamometer: R: 41#         L: 13#      MMT: R: NT d/t pain per pt request               L: 4-/5      Patient has made fair  progress during treatment sessions toward set goals. Therapy emphasis to obtain goals:  Current Treatment Recommendations: Strengthening, Gait Training, Patient/Caregiver Education & Training, Home Management Training, ROM, Equipment Evaluation, Education, & procurement, Balance Training, Functional Mobility Training, Endurance Training, Wheelchair Mobility Training, Safety Education & Training, Self-Care / ADL      Long term goal 2: Pt demo Mod I grooiming seated  Long term goal 3: Pt demo Mod A bathing @ sink level or shower level seated  Long term goal 4: Pt demo s/u UE & Min A LE dress to don depends, pants, socks & shoes using AE as needed  Long term goal 5: Pt demo Mod  A commode trf wtih appropriate DME to ensure pt safety  Long term goals 6: Pt demo Min-Mod A tub trf with appropriate DME to ensure pt safety- discontinued   Long term goal 7: Pt demo Sup light homemaking activity @ wc level  Long term goal 8: Pt demo G- endurance for a 20 minute funcitonal activity  Long term goal 9: Pt demo increase BUE strength to 4+/5 to improve pt ability to complete ADLs  Long term goal 10: Pt demo Mod I wc propulsion throughout a living environment & around obstacles    2/16/21: Pt plan of care updated on this date. Family teach scheduled for 2/19/21. Pt tentative length of stay is 2 weeks. 2/23/21- Pt plan of care updated on this date. Tentative discharge is 2/26/21  [x] Continue with current OT Plan of care.   [] Prepare for Discharge     DISCHARGE RECOMMENDATIONS  Recommended DME: transfer board, bariatric 3-in-1, w/c cushion for pressure relief    Post Discharge Care:   []Home Independently  [x]Home with 24hr Care / Supervision []Home with Partial Supervision []Home with Home Health OT []Home with Out Pt OT []Other: ___   Comments:         Time in Time out Tx Time Breakdown  Variance:   First Session  0830 0915 [x] Individual  tx -45 Mins    [] Concurrent Tx-   [] Co-Tx -   [] Group Tx -   [] Time Missed -             Second Session 1300 1345 [] Individual Tx-   [x] Concurrent Tx -45 Mins  [] Co-Tx -   [] Group Tx -   [] Time Missed -     Third Session    [] Individual Tx-   [] Concurrent Tx -  [] Co-Tx -   [] Group Tx -   [] Time Missed -         Total Tx Time: 90 Mins     Nick Becerril ABDULLAHI/L [id number]    I have read & agree with the above status.     Maddie Navarrete OTR/L [id number]

## 2021-02-25 NOTE — PROGRESS NOTES
Speech Language Pathology  ACUTE REHABILITATION--DAILY PROGRESS NOTE          ADMITTING DIAGNOSIS: Acute metabolic encephalopathy [O84.52]     SPEECH PATHOLOGY DIAGNOSIS:    Mild-moderate cognitive deficit. 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 Botox received in 2008 with short term success). Decreased intelligibility due to weakness, missing dentition, fast rate and resonance issues.     THERAPY RECOMMENDATIONS:   Speech Pathology intervention is recommended 3-6 times per week for LOS or when goals are met with emphasis on the following:      Improve STM recall, sequencing and problem solving for increased functional independence with completion of ADLs/IADLs to a FIM level commensurate with modified independent.                                                                                                                                         FIMS SCORES                            Swallowing                          Current Status             1--Total Assistance                   Short Term Goal         4--Minimal Assistance               Long Term Goal          5--Supervision      Receptive                          Current Status            6--Modified Independent             Short Term Goal         6--Modified Independent                       Long Term Goal          6--Modified Independent              Expressive                          Current Status             6--Modified Independent              Short Term Goal         6--Modified Independent                       Long Term Goal          6--Modified Independent              Social Interaction                          Current Status             6--Modified Independent                       Short Term Goal         6--Modified Independent                       Long Term Goal          6--Modified Independent Problem Solving                          Current Status            4--Minimal Assistance                      Short Term Goal         5--Supervision     Long Term Goal          5--Supervision                            Memory                          Current Status            4--Minimal Assistance                      Short Term Goal         5--Supervision     Long Term Goal          5--Supervision                                              SWALLOWING:      Diet:  NPO (nothing by mouth including oral meds). PEG tube in place. Chronic dysphagia reported but inconsistent information provided by patient. Lengthy case history taken by other SLP from patient and from pulmonary NP- see MBS report. Note from 2/4: Speech Pathologist (SLP) completed education with the patient/family regarding procedure of Modified Barium Swallow Study prior to exam and then type of swallowing impairment following completion of MBSS. Reviewed current solid/liquid consistency diet recommendations (NPO (nothing by mouth including oral meds). Discussed strategies trialed (as previously used by Pt) with poor outcome and continued aspiration. Images from MBSS reviewed with patient/ family and education provided. Reviewed aspiration precautions. Encouraged patient and/or family to engage SLP in unstructured Q&A session relative to identified deficit areas; indicated understanding of all information provided via satisfactory verbal response. LANGUAGE:       Patient had a difficulty adhering to topics chosen by conversation partner, alerting conversation partner to changes in topics, and terminating conversation turns. Patient talked about what he wanted to talk about, regardless of clinician interest, feedback, or redirection. COGNITION:        Patient identified problems and solutions from financial scenarios with ~80% accuracy. Patient benefited from multiple cues to keep patient on task and provide relevant information. Safety:  Fair-   Other therapies (PT, OT) report concerns with safety. SPEECH:     Difficult to understand at times due to facial weakness and absence of teeth. SP recommended after discharge: yes  Supervision recommended at discharge: 24 hour    Will continue SP intervention as per previously established POC.     Minute Tracking:    Individual therapy:    30 minutes  Concurrent therapy:    0 minutes  Group therapy:     0 minutes  Co-treatment therapy:    0 minutes    Total minutes for 2/25/2021: 30 minutes     Selina Mcardle   SLP student intern

## 2021-02-25 NOTE — PROGRESS NOTES
Problem Solving                          Current Status            4--Minimal Assistance                      Short Term Goal         5--Supervision     Long Term Goal          5--Supervision                            Memory                          Current Status            4--Minimal Assistance                      Short Term Goal         5--Supervision     Long Term Goal          5--Supervision                                              SWALLOWING:      Diet:  NPO (nothing by mouth including oral meds). PEG tube in place. Chronic dysphagia reported but inconsistent information provided by patient. Lengthy case history taken by other SLP from patient and from pulmonary NP- see MBS report. Patient seen for skilled dysphagia tx. Patient able to complete effortful swls x20 w/ good follow through noted. Patient able to complete tongue base retraction exercises to increase AP tongue propulsion x20 w/ good follow through noted. ST reviewed swallow exercises w/ patient and patient verbalized understanding. Patient encouraged to completed exercises at least 3x daily. LANGUAGE:       Functional given increased time    COGNITION:        Not targeted this session. Safety:  Fair-   Other therapies (PT, OT) report concerns with safety. SPEECH:     Difficult to understand at times due to facial weakness and absence of teeth. SP recommended after discharge: yes  Supervision recommended at discharge: 24 hour    Will continue SP intervention as per previously established POC. Minute Tracking:    Individual therapy:    15 minutes + additional sessions  Concurrent therapy:    0 minutes  Group therapy:     0 minutes  Co-treatment therapy:    0 minutes    Total minutes for 2/25/2021: 15 minutes + additional session.

## 2021-02-26 VITALS
HEIGHT: 75 IN | BODY MASS INDEX: 26.42 KG/M2 | RESPIRATION RATE: 28 BRPM | SYSTOLIC BLOOD PRESSURE: 116 MMHG | WEIGHT: 212.5 LBS | DIASTOLIC BLOOD PRESSURE: 63 MMHG | HEART RATE: 116 BPM | TEMPERATURE: 98.6 F | OXYGEN SATURATION: 100 %

## 2021-02-26 LAB — AMMONIA: 57 UMOL/L (ref 16–60)

## 2021-02-26 PROCEDURE — 97530 THERAPEUTIC ACTIVITIES: CPT

## 2021-02-26 PROCEDURE — 6370000000 HC RX 637 (ALT 250 FOR IP): Performed by: PHYSICAL MEDICINE & REHABILITATION

## 2021-02-26 PROCEDURE — 36415 COLL VENOUS BLD VENIPUNCTURE: CPT

## 2021-02-26 PROCEDURE — 94640 AIRWAY INHALATION TREATMENT: CPT

## 2021-02-26 PROCEDURE — 82140 ASSAY OF AMMONIA: CPT

## 2021-02-26 RX ORDER — IPRATROPIUM BROMIDE AND ALBUTEROL SULFATE 2.5; .5 MG/3ML; MG/3ML
3 SOLUTION RESPIRATORY (INHALATION)
Qty: 360 ML | Refills: 2 | Status: SHIPPED | OUTPATIENT
Start: 2021-02-26

## 2021-02-26 RX ORDER — PANTOPRAZOLE SODIUM 40 MG/1
40 TABLET, DELAYED RELEASE ORAL
Qty: 30 TABLET | Refills: 3 | Status: SHIPPED | OUTPATIENT
Start: 2021-02-26

## 2021-02-26 RX ORDER — ASPIRIN 81 MG/1
81 TABLET, CHEWABLE ORAL DAILY
Qty: 30 TABLET | Refills: 3 | Status: SHIPPED | OUTPATIENT
Start: 2021-02-26

## 2021-02-26 RX ORDER — LACTULOSE 10 G/15ML
20 SOLUTION ORAL 3 TIMES DAILY
Qty: 2700 ML | Refills: 2 | Status: SHIPPED | OUTPATIENT
Start: 2021-02-26 | End: 2021-03-28

## 2021-02-26 RX ADMIN — RIFAXIMIN 550 MG: 550 TABLET ORAL at 08:33

## 2021-02-26 RX ADMIN — METOPROLOL TARTRATE 25 MG: 25 TABLET, FILM COATED ORAL at 08:33

## 2021-02-26 RX ADMIN — ASPIRIN 81 MG: 81 TABLET, CHEWABLE ORAL at 08:33

## 2021-02-26 RX ADMIN — APIXABAN 5 MG: 5 TABLET, FILM COATED ORAL at 08:33

## 2021-02-26 RX ADMIN — IPRATROPIUM BROMIDE AND ALBUTEROL SULFATE 1 AMPULE: 2.5; .5 SOLUTION RESPIRATORY (INHALATION) at 11:27

## 2021-02-26 RX ADMIN — PANTOPRAZOLE SODIUM 40 MG: 40 TABLET, DELAYED RELEASE ORAL at 06:39

## 2021-02-26 ASSESSMENT — PAIN SCALES - GENERAL
PAINLEVEL_OUTOF10: 0

## 2021-02-26 NOTE — PLAN OF CARE
Problem: Skin Integrity:  Goal: Will show no infection signs and symptoms  Description: Will show no infection signs and symptoms  Outcome: Completed  Goal: Absence of new skin breakdown  Description: Absence of new skin breakdown  Outcome: Completed     Problem: Falls - Risk of:  Goal: Will remain free from falls  Description: Will remain free from falls  Outcome: Completed  Goal: Absence of physical injury  Description: Absence of physical injury  Outcome: Completed     Problem: Respiratory:  Goal: Ability to maintain a clear airway will improve  Description: Ability to maintain a clear airway will improve  Outcome: Completed  Goal: Will remain free from infection  Description: Will remain free from infection  Outcome: Completed  Goal: Absence of aspiration  Description: Absence of aspiration  Outcome: Completed     Problem: Safety:  Goal: Ability to chew and swallow food without choking will improve  Description: Ability to chew and swallow food without choking will improve  Outcome: Completed  Goal: Ability to demonstrate good, daily oral hygiene techniques will improve  Description: Ability to demonstrate good, daily oral hygiene techniques will improve  Outcome: Completed  Goal: Maintenance of upright position during and after feeding  Description: Maintenance of upright position during and after feeding  Outcome: Completed     Problem: Pain:  Goal: Pain level will decrease  Description: Pain level will decrease  Outcome: Completed  Goal: Control of acute pain  Description: Control of acute pain  Outcome: Completed  Goal: Control of chronic pain  Description: Control of chronic pain  Outcome: Completed

## 2021-02-26 NOTE — CARE COORDINATION
Spoke with son - ambulette  at 1pm.    Spoke with Kayce Jonas - @ [facility] - DME will be delivered after 1pm.  The nebulizer medication will be delivered tomorrow. [facility]. Nursing to supply tube feeds to bring home as well.     Brady Fess

## 2021-02-26 NOTE — PROGRESS NOTES
Speech Language Pathology  ACUTE REHABILITATION  DISCHARGE SUMMARY          ADMITTING DIAGNOSIS: Acute metabolic encephalopathy [W84.21]     SUMMARY OF EVALUATION                          Pt has long standing dysphagia hx; discussed case with Pt and then later with pulmonary NP who has followed Pt long term. 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 Per pulmonary NP, Pt has had multiple aspiration events, is currently with his second trach placement, and has COPD along with compromised lung status; no immediate plans for decannulation d/t previous episodes of hypercapnia.                      DYSPHAGIA DIAGNOSIS:  Severe oropharyngeal dysphagia                            DIET RECOMMENDATIONS:  NPO (nothing by mouth including oral meds)                   FEEDING RECOMMENDATIONS:                           Assistance level:  Not applicable                             Compensatory strategies recommended: Not applicable     THERAPY RECOMMENDATIONS:                  Trial dysphagia therapy is recommended 3-5 times per week for LOS or when goals are met with emphasis on home exercise program / independence      Pt will complete BOTR strength/ ROM exercises to reduce pharyngeal residuals and improve epiglottic inversion with  maximal verbal prompts. Pt will complete laryngeal strength/ ROM therapeutic exercises to improve airway protection for the least restrictive PO diet with  maximal verbal prompts       SPEECH PATHOLOGY DIAGNOSIS:    Mild-moderate cognitive deficit. 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 Botox received in 2008 with short term success).  Decreased intelligibility due to weakness, missing dentition, fast rate and resonance issues.      THERAPY RECOMMENDATIONS:   Speech Pathology intervention is recommended 3-6 times per week for LOS or when goals are met with emphasis on the following:       Improve STM recall, sequencing and problem solving for increased functional independence with completion of ADLs/IADLs to a FIM level commensurate with modified independent. FIMS SCORES                            Swallowing                          Current Status             1--Total Assistance                   Short Term Goal         4--Minimal Assistance               Long Term Goal          5--Supervision      Receptive                          Current Status            6--Modified Independent             Short Term Goal         6--Modified Independent                       Long Term Goal          6--Modified Independent              Expressive                          Current Status             6--Modified Independent              Short Term Goal         6--Modified Independent                       Long Term Goal          6--Modified Independent              Social Interaction                          Current Status             6--Modified Independent                       Short Term Goal         6--Modified Independent                       Long Term Goal          6--Modified Independent                                                             Problem Solving                          Current Status            4--Minimal Assistance                      Short Term Goal         5--Supervision     Long Term Goal          5--Supervision                            Memory                          Current Status            4--Minimal Assistance                      Short Term Goal         5--Supervision     Long Term Goal          5--Supervision                                               Diet:  NPO (nothing by mouth including oral meds). PEG tube in place. Chronic dysphagia reported but inconsistent information provided by patient. Lengthy case history taken by other SLP from patient and from pulmonary NP- see MBS report. EDUCATION:     Speech Pathologist (SLP) completed education with the patient/family regarding procedure of Modified Barium Swallow Study prior to exam and then type of swallowing impairment following completion of MBSS. Reviewed current solid/liquid consistency diet recommendations (NPO (nothing by mouth including oral meds). Discussed strategies trialed (as previously used by Pt) with poor outcome and continued aspiration. Images from MBSS reviewed with patient/ family and education provided. Reviewed aspiration precautions. Encouraged patient and/or family to engage SLP in unstructured Q&A session relative to identified deficit areas; indicated understanding of all information provided via satisfactory verbal response. Speech Pathologist (SLP) completed education with the patient and son regarding type of cognitive impairment. Discussed compensatory strategies to assist in improving attention, STM/LTM, problem solving, abstract reasoning and safety/insight. Encouraged patient and son to engage SLP in structured Q&A session relative to identified deficit areas. Patient and son indicated understanding of all information provided via satisfactory verbal response. OUTCOME:       Progress made toward goals. Recommend ongoing SP intervention to target deficit areas.     SP recommended after discharge: yes  Supervision recommended at discharge: 24 hour

## 2021-02-26 NOTE — PROGRESS NOTES
OCCUPATIONAL THERAPY DAILY NOTE/DISCHARGE SUMMARY    Date:2021  Patient Name: Yane Chen  MRN: [id number]  : 1945  Room: [address] [place] [address]     Diagnosis: RF secondary to aspiration @ home, trach & peg  Additional Pertinent Hx: CHF, COPD, hx CVA, dysphagia, peg heart disease  Precautions: Falls, NPO, Peg    Functional Assessment:   Date Status AE  Comments   Feeding 21 NPO     Grooming 21 Supervision seated          Oral Care 21 SBA seated    Bathing 21 Mod A Seated    UB Dressing 21 Supervision     LB Dressing 21 Mod A     Footwear 21 Mod A Seated     Toileting 2/3/21 Max A Bed level     Homemaking 21 SBA       Functional Transfers / Balance:   Date Status DME  Comments   Sit Balance 21 SBA/S  Seated at EOB. Stand Balance 21 Mod A x2     [] Tub  [] Shower   Transfer  TBA-  Pt sponge bathed at EOB PTA. Commode   Transfer 21 Mod/Min A Bariatric drop arm 3-in-1, transfer  sliding board Min cues for sequencing/safety required. Assist required for board placement and transfer to/from for safety. Functional   Mobility 21 Supervision W/c Throughout OT clinic with min cues for safety required when maneuvering around environmental obstacles. Pt completed functional item retrieval activity from floor and various surfaces with reacher for safety. Min cues for safety/technique provided. Other:  Bed>W/c      Supine to EOB   21   Min A      SBA/S   Transfer board    Bed rail        Functional Exercises / Activity  -Peg board activity completed on incline with focus on increasing trunk control, functional reaching and B UE AROM. Increased time required to complete. Fair tolerance.          Sensory / Neuromuscular Re-Education:      Cognitive Skills:   Status Comments   Problem   Solving fair  During therapeutic activities and w/c propelling   Memory fair  \"   Sequencing fair  \"   Safety fair - Min cues for safety awareness/insight required using sliding board. Visual Perception:    Education:  Pt educated on w/c maneuverability techniques in small space with fair+ understanding, recommend continued education. [x] Family teach completed on: 2/17/21: No family present for scheduled FT session this date. 2/19/21: No family present for scheduled FT this AM. 2/22/21: Pt's son arrived at ~1320 for scheduled FT session this date. Discussed pt's current ADL level of assist and recommendation for transfer board transfer to [facility] for safety. Demetrius Hicks reports that he was assisting pt with all bathing/dressing PTA. Pt completed transfer to/from [facility] with assist from therapist despite encouragement of son to provide hands on assist. Son stated multiple times that he just SPT's pt to the commode when needed and that PTA pt was utilizing urinal and would have BM in his depend d/t decreased awareness of bowel movements and that the son would provide hygiene to pt bed level following a BM. Son stated pt received a w/c cushion from [facility] and it's \"flat as a rock\". Pt's son reports no further questions/concerns. Pain Level: No c/o pain. Additional Notes:    2/15/21: Dynamometer:R: 34        L: 10       9 Hole Peg: R: 49 Sec         L: 35 Sec    2/22/21: Dynamometer: R: 41#         L: 13#      MMT: R: NT d/t pain per pt request               L: 4-/5      Patient has made fair  progress during treatment sessions toward set goals.  Therapy emphasis to obtain goals:  Current Treatment Recommendations: Strengthening, Gait Training, Patient/Caregiver Education & Training, Home Management Training, ROM, Equipment Evaluation, Education, & procurement, Balance Training, Functional Mobility Training, Endurance Training, Wheelchair Mobility Training, Safety Education & Training, Self-Care / ADL      Long term goal 2: Pt demo Mod I grooiming seated  Long term goal 3: Pt demo Mod A bathing @ sink level or shower level seated  Long term goal 4: Pt demo s/u UE & Min A LE dress to don depends, pants, socks & shoes using AE as needed  Long term goal 5: Pt demo Mod  A commode trf wtih appropriate DME to ensure pt safety  Long term goals 6: Pt demo Min-Mod A tub trf with appropriate DME to ensure pt safety- discontinued   Long term goal 7: Pt demo Sup light homemaking activity @ wc level  Long term goal 8: Pt demo G- endurance for a 20 minute funcitonal activity  Long term goal 9: Pt demo increase BUE strength to 4+/5 to improve pt ability to complete ADLs  Long term goal 10: Pt demo Mod I wc propulsion throughout a living environment & around obstacles    2/16/21: Pt plan of care updated on this date. Family teach scheduled for 2/19/21. Pt tentative length of stay is 2 weeks. 2/23/21- Pt plan of care updated on this date. Tentative discharge is 2/26/21  [] Continue with current OT Plan of care. [x] Prepare for Discharge    OCCUPATIONAL THERAPY DISCHARGE SUMMARY    Discontinue Occupational Therapy intervention. Pt has:   [] met all set goals. [x] made good progress toward set goals. [] has made slow progress toward goals and would benefit from rehab setting change.  [] had a medical decline and therefore was transferred off the Rehab unit.     Long term goals  Time Frame for Long term goals : 4 weeks to address above problem areas  Long term goal 2: Pt demo Mod I grooiming seated  Long term goal 3: Pt demo Min A bathing @ sink level or shower level seated  Long term goal 4: Pt demo s/u UE & Min A LE dress to don depends, pants, socks & shoes using AE as needed  Long term goal 5: Pt demo Min-Mod  A commode trf wtih appropriate DME to ensure pt safety  Long term goals 6: Pt demo Min-Mod A tub trf wtih appropriate DME to ensure pt safety  Long term goal 7: Pt demo Sup light homemaking activity @ wc level  Long term goal 8: Pt demo G- endurance for a 20 minute funcitonal activity  Long term goal 9: Pt demo increase BUE strength to 4+/5 to improve pt ability to complete ADLs    The Patient has received education on:  [x]Transfer Safety [x]Compensatory tech for ADLs [x]AE training [x]DME training [x]Energy Conservation [x]Home / Kitchen Safety  [x]Fall Prevention  []Other:    Family training was completed: yes       DISCHARGE RECOMMENDATIONS  Recommended DME: transfer board, bariatric 3-in-1, w/c cushion for pressure relief, reacher (pt reports he has one at home)    Post Discharge Care:   []Home Independently  [x]Home with 24hr Care / Supervision []Home with Partial Supervision []Home with Home Health OT []Home with Out Pt OT []Other: ___   Comments:         Time in Time out Tx Time Breakdown  Variance:   First Session  0830 0915 [x] Individual  tx -45 Mins    [] Concurrent Tx-   [] Co-Tx -   [] Group Tx -   [] Time Missed -             Second Session   [] Individual Tx-   [] Concurrent Tx -  [] Co-Tx -   [] Group Tx -   [] Time Missed -     Third Session    [] Individual Tx-   [] Concurrent Tx -  [] Co-Tx -   [] Group Tx -   [] Time Missed -         Total Tx Time: 45 Mins     June Cramp ABDULLAHI/L [id number]

## 2021-02-26 NOTE — PROGRESS NOTES
Physical Therapy  Daily Treatment Note/Discharge Summary  Evaluating Therapist: Haja Tamez DPT    NAME: Nghia Payan  ROOM: [facility]  DIAGNOSIS: Acute metabolic encephalopathy  PRECAUTIONS: Falls, PEG tube, trach (capped), L hemiparesis, LE proprioceptive deficits  HPI: 76year old male admitted to [facility] on 11/18/20 with respiratory failure secondary to witnessed aspiration event at home - chronic dysphagia with feeding tube. Intubated twice at [facility] with subsequent trach placement. Admitted to [facility] for vent weaning. Transferred to ARU 2/1. Social:  Pt lives with his son in a trailer with 8 steps with B rails or a ramp to enter. Prior to admission pt ambulated with [employer] Mod I, however this information was obtained from an old note in his chart. It is currently believed that the pt was not independent and rather required assistance with all activities. 2/22/21 Pt's son confirmed that the pt required near Max A for half stand pivot transfers at home prior to his most recent hospital admission. Initial Evaluation  2/3/21  AM 2/26/21    Short Term Goals Long Term Goals    Bed Mobility  Rolling: SBA  Supine to sit: SBA  Sit to supine: SBA  Scooting: SBA Independent all aspects Supervision Independent   Transfers Sit to stand: Max A x2  Stand to sit: Max A x2  Stand pivot: NT  Slideboard: Max A Sit to stand: Max A  Stand to sit:  Max A  Stand pivot: NT  Slideboard: Min A mat table<>wheelchair Min A SBA   Ambulation   NT 10 feet  in parallel bars with Mod A + WC follow     Walking 10 feet on uneven surface NT NT     Wheel Chair Mobility 100 feet  feet Mod I >200 feet Supervision >200 feet Independent   Car Transfers NT NA - pt travels in wheelchair vans Min A SBA   Stair negotiation: ascended and descended NT NT     Curb Step:   ascended and descended NT NT     Picking up object off the floor NT NT     BLE ROM WFL WFL     BLE Strength RLE 4/5  LLE 3+/5 RLE 4/5  LLE 3+/5     Balance  Sitting: SBA   Static stand: Max A x2 Sitting: Independent  Static stand: Max A      Date Family Teach Completed TBA Son on 2/22/21; Son was a no-show for family training 3 times     Is additional Family Teaching Needed? Y or N Y N     Hindering Progress Debility, L hemiparesis Debility, L hemiparesis     PT recommended ELOS 4-5 weeks      Team's Discharge Plan       Therapist at Team Meeting         Therapeutic exercise: Standing knee bends 2x15 to improve closed chain knee extensor strength    Comments: Pt was discharged today to home with his son. There was confusion on exactly how functional the pt was PTA. Son during [place] cleared up that he assisted pt with performing low stand pivot transfers at a Max A level and assisted him with all ADLs. Pt during  was educated that continuing to perform Max A low pivot transfers is not best practice and is a safety risk, however the pt's son refused to consider a nursing home and therefor wishes to proceed with caring for the pt at home. Pt make limited progress in therapy as he has significant amounts of debility and weakness. Pt' son stated that the pt is at his baseline.  Pt is going to continue with [address], DPT [id number]

## 2021-02-27 NOTE — DISCHARGE SUMMARY
[address]                  [address] [facility]  [facility]                               DISCHARGE SUMMARY    PATIENT NAME: Hardeep Cline                     :        1945  MED REC NO:   [id number]                            ROOM:       5508  ACCOUNT NO:   [de-identified]                           ADMIT DATE: 2021  PROVIDER:     Luna Banerjee MD                  DISCHARGE DATE:  2021    INTRODUCTION:  The patient had been at [facility].  He had a CVA with respiratory failure and ventilator  dependence dating back to 2020. He ended up with a trach and PEG  tube. He was transferred to [facility] on 2021 and I saw  him there. He was weaned off the ventilator. The trach was downsized  and he was transferred to [address] [place] [address] acute rehab unit on  2021. HOSPITAL COURSE:  On admission, the patient was felt to be a good rehab  candidate. We did a repeat video and he was still showing aspiration  and has a history very suspicious of that even before this. It was  decided that the trach had to be left in place. The PEG tube also had  to be left in place. He showed good progress with the therapies and  with his physical functioning. He was minimal assist for sliding board  transfers, maximal with stand pivot. He was moderate for ambulation in  the parallel bars of about 10 feet. He was moderate assist for most of  his ADLs, but he was participating much better overall and moving better  overall. His son was instructed and it was felt that he could be  managed at home and was discharged home with his son on 2021. Discharge condition is fair. Discharge is to home. LABORATORY DATA:  Ammonia level was 78 and I am getting a repeat. Metabolic profile was otherwise unremarkable except for a BUN of 26 and  a creatinine of 0.7. Liver functions were all within normal limits. White count was a little low at 2700 with a hemoglobin of 13.3. MEDICATIONS:  Eliquis 5 mg b.i.d., aspirin 81 mg daily, DuoNeb aerosols,  Chronulac 20 gm t.i.d., rifaximin 550 mg b.i.d., Protonix 40 mg daily,  and he is on 5 cans of tube feeding daily. DIAGNOSES:  1. CVA with left hemiparesis. 2.  Prolonged ventilator dependence. 3.  Aspiration pneumonia. 4.  Status post trach. 5.  Status post PEG tube. 6.  Coronary artery disease. 7.  History of atrial fib, on Eliquis. 8.  Metabolic encephalopathy with elevated ammonia levels.         Korin Quintero MD    D: 02/26/2021 8:02:26       T: 02/26/2021 8:12:36     [email or web address]  Job#: [id number]     Doc#: [id number]    CC:

## 2021-04-22 ENCOUNTER — HOSPITAL ENCOUNTER (EMERGENCY)
Age: 76
Discharge: HOME | End: 2021-04-22
Attending: EMERGENCY MEDICINE
Payer: MEDICARE

## 2021-04-22 ENCOUNTER — APPOINTMENT (OUTPATIENT)
Dept: GENERAL RADIOLOGY | Age: 76
End: 2021-04-22
Payer: MEDICARE

## 2021-04-22 VITALS
TEMPERATURE: 97.1 F | BODY MASS INDEX: 26.36 KG/M2 | HEIGHT: 75 IN | HEART RATE: 107 BPM | SYSTOLIC BLOOD PRESSURE: 142 MMHG | RESPIRATION RATE: 16 BRPM | WEIGHT: 212 LBS | OXYGEN SATURATION: 96 % | DIASTOLIC BLOOD PRESSURE: 82 MMHG

## 2021-04-22 DIAGNOSIS — Z43.0 ENCOUNTER FOR TRACHEOSTOMY TUBE CHANGE (HCC): Primary | ICD-10-CM

## 2021-04-22 PROCEDURE — 71045 X-RAY EXAM CHEST 1 VIEW: CPT

## 2021-04-22 PROCEDURE — 31502 CHANGE OF WINDPIPE AIRWAY: CPT

## 2021-04-22 PROCEDURE — 99283 EMERGENCY DEPT VISIT LOW MDM: CPT

## 2021-04-22 NOTE — ED PROVIDER NOTES
HPI:  4/22/21, Time: 2:33 AM EDT         Brenden Ashby is a 68 y.o. male presenting to the ED for tracheostomy was dislodged, beginning short time ago. The complaint has been persistent, mild in severity, and worsened by nothing. Patient presenting here because of tracheostomy being dislodged. [facility] Patient has history of stroke. He has weakness on left side from prior stroke. Patient reporting no chest pain no abdominal pain or vomiting he reports no diarrhea the apparently attempted to place the tracheostomy back at outlying facility unable to. Patient is on Eliquis. Patient reporting no fever or chills. ROS:   Pertinent positives and negatives are stated within HPI, all other systems reviewed and are negative.  --------------------------------------------- PAST HISTORY ---------------------------------------------  Past Medical History:  has no past medical history on file. Past Surgical History:  has no past surgical history on file. Social History:  reports that he quit smoking about 40 years ago. His smoking use included pipe. He has a 30.00 pack-year smoking history. He has never used smokeless tobacco. He reports that he does not drink alcohol or use drugs. Family History: family history is not on file. The patients home medications have been reviewed. Allergies: Patient has no known allergies. ---------------------------------------------------PHYSICAL EXAM--------------------------------------    Constitutional/General: Alert and oriented x3, well appearing, non toxic in NAD  Head: Normocephalic and atraumatic  Eyes: PERRL, EOMI, noted dried blood left side of nose  Mouth: Oropharynx clear, handling secretions, no trismus  Neck: Supple, full ROM, non tender to palpation in the midline, no stridor, no crepitus, no meningeal signs tracheostomy site noted  Pulmonary: Lungs clear to auscultation bilaterally, no wheezes, rales, or rhonchi.  Not in respiratory distress  Cardiovascular:  Regular rate. Regular rhythm. No murmurs, gallops, or rubs. 2+ distal pulses  Chest: no chest wall tenderness  Abdomen: Soft. Non tender. Non distended. +BS. No rebound, guarding, or rigidity. No pulsatile masses appreciated. Musculoskeletal: Moves all extremities x 4. Warm and well perfused, no clubbing, cyanosis, or edema. Capillary refill <3 seconds  Skin: warm and dry. No rashes. Neurologic: GCS 15, CN 2-12 grossly intact, no focal deficits, moves all extremities noted weakness to left lower extremity from prior stroke  Psych: Normal Affect    -------------------------------------------------- RESULTS -------------------------------------------------  I have personally reviewed all laboratory and imaging results for this patient. Results are listed below. LABS:  No results found for this visit on 04/22/21. RADIOLOGY:  Interpreted by Radiologist.  XR CHEST PORTABLE   Final Result   Tracheostomy tube tip is 6.3 cm above ramone. Unchanged mild cardiomegaly. No acute abnormality. EKG Interpretation      ------------------------- NURSING NOTES AND VITALS REVIEWED ---------------------------   The nursing notes within the ED encounter and vital signs as below have been reviewed by myself. BP (!) 142/82   Pulse 107   Temp 97.1 °F (36.2 °C)   Resp 16   Ht 6' 3\" (1.905 m)   Wt 212 lb (96.2 kg)   SpO2 98%   BMI 26.50 kg/m²   Oxygen Saturation Interpretation: Normal    The patients available past medical records and past encounters were reviewed. ------------------------------ ED COURSE/MEDICAL DECISION MAKING----------------------  Medications - No data to display          Medical Decision Making:    Patient presenting here because of dislodged tracheostomy. Patient had a size 6 uncuffed originally placed. They attempted replacing the outlying facility. They were unable to.   Patient here in the emergency department I attempted to place a size 6 uncuffed unable to pass. I was able to pass a size 4 tracheostomy patient tolerated well    Re-Evaluations:             Re-evaluation. Patients symptoms show no change  Patient reevaluated and in no distress. While here in the emergency department I was able to place a size 4 tracheostomy and tracheostomy site. Patient tolerated well. Patient had minimal bleeding at site. Patient in no distress here chest x-ray was ordered plan will be to discharge back to facility    Consultations:                 Critical Care: This patient's ED course included: a personal history and physicial eaxmination    This patient has been closely monitored during their ED course. Counseling: The emergency provider has spoken with the patient and discussed todays results, in addition to providing specific details for the plan of care and counseling regarding the diagnosis and prognosis. Questions are answered at this time and they are agreeable with the plan.       --------------------------------- IMPRESSION AND DISPOSITION ---------------------------------    IMPRESSION  1. Encounter for tracheostomy tube change ([facility] [address]        DISPOSITION  Disposition: Discharged back to facility   patient condition is stable        NOTE: This report was transcribed using voice recognition software.  Every effort was made to ensure accuracy; however, inadvertent computerized transcription errors may be present          Asya Sheppard MD  04/22/21 5114       Asya Sheppard MD  04/22/21 1862

## 2021-04-22 NOTE — ED NOTES
Bed: 13  Expected date:   Expected time:   Means of arrival:   Comments:  ems     [facility] [address], RN  04/22/21 0607

## 2023-11-24 NOTE — PROGRESS NOTES
OCCUPATIONAL THERAPY DAILY NOTE    Date:2/3/2021  Patient Name: Vivek Self  MRN: [id number]  : 1945  Room: [address] [place], [place] [address]     Diagnosis: RF secondary to aspiration @ home, trach & peg  Additional Pertinent Hx: CHF, COPD, hx CVA, dysphagia, peg heart disease  Precautions: Falls, NPO, Peg    Functional Assessment:   Date Status AE  Comments   Feeding 2/3/21 Min A     Grooming 2/3/21 Min A           Oral Care 2/3/21 Min A     Bathing 2/3/21 Max/Dep     UB Dressing 2/3/21 Mod A     LB Dressing 2/3/21 Dependent     Footwear 2/3/21 Dependent     Toileting 2/3/21 Max A     Homemaking  TBA       Functional Transfers / Balance:   Date Status DME  Comments   Sit Balance 2/3/21 SBA     Stand Balance 2/3/21 Max A     [] Tub  [] Shower   Transfer 2/3/21 TBA     Commode   Transfer 2/3/21 Dependent     Functional   Mobility 2/3/21 Min A w/c    Other:         Functional Exercises / Activity:   -Medium resistance putty/wheel exercise completed seated at table with focus on increasing B UE strength and overall endurance for functional activities. Fair tolerance.   -Shoulder arc completed seated at table with 1# wrist weights donned with focus on increasing B UE strength and overall endurance for functional activities. Increased time required to complete. Fair- tolerance. Sensory / Neuromuscular Re-Education:      Cognitive Skills:   Status Comments   Problem   Solving fair     Memory fair     Sequencing fair     Safety fair -      Visual Perception:    Education:  -Pt educated on role of OT. Pt verbalized understanding. [] Family teach completed on:    Pain Level: 3/10 R shoulder    Additional Notes:       Patient has made fair  progress during treatment sessions toward set goals.  Therapy emphasis to obtain goals:  Current Treatment Recommendations: Strengthening, Gait Training, Patient/Caregiver Education & Training, Home Management Training, ROM, Equipment Evaluation, Education, & procurement, Balance Training, Functional Mobility Training, Endurance Training, Wheelchair Mobility Training, Safety Education & Training, Self-Care / ADL     Long term goal 1: Pt demo Mod I to eat all meals  Long term goal 2: Pt demo Mod I grooiming seated  Long term goal 3: Pt demo Min A bathing @ sink level or shower level seated  Long term goal 4: Pt demo s/u UE & Min A LE dress to don depends, pants, socks & shoes using AE as needed  Long term goal 5: Pt demo Min-Mod  A commode trf wtih appropriate DME to ensure pt safety  Long term goals 6: Pt demo Min-Mod A tub trf with appropriate DME to ensure pt safety  Long term goal 7: Pt demo Sup light homemaking activity @ wc level  Long term goal 8: Pt demo G- endurance for a 20 minute funcitonal activity  Long term goal 9: Pt demo increase BUE strength to 4+/5 to improve pt ability to complete ADLs  Long term goal 10: Pt demo Mod I wc propulsion throughout a living environment & around obstacles    [x] Continue with current OT Plan of care. [] Prepare for Discharge     DISCHARGE RECOMMENDATIONS  Recommended DME:    Post Discharge Care:   []Home Independently  [x]Home with 24hr Care / Supervision []Home with Partial Supervision []Home with Home Health OT []Home with Out Pt OT []Other: ___   Comments:         Time in Time out Tx Time Breakdown  Variance:   First Session  eval+rx  [] Individual Tx-   [] Concurrent Tx -  [] Co-Tx -   [] Group Tx -   [] Time Missed -     Second Session [phone] [x] Individual Tx-45 Mins   [] Concurrent Tx -  [] Co-Tx -   [] Group Tx -   [] Time Missed -     Third Session    [] Individual Tx-   [] Concurrent Tx -  [] Co-Tx -   [] Group Tx -   [] Time Missed -         Total Tx Time 45 Mins        Nadiya ABDULLAHI/L [id number]    I have read & agree with the above status.     Devin Love OTR/L [id number] 24-Nov-2023